# Patient Record
Sex: FEMALE | Race: WHITE | NOT HISPANIC OR LATINO | Employment: FULL TIME | URBAN - METROPOLITAN AREA
[De-identification: names, ages, dates, MRNs, and addresses within clinical notes are randomized per-mention and may not be internally consistent; named-entity substitution may affect disease eponyms.]

---

## 2017-07-26 DIAGNOSIS — Z12.31 VISIT FOR SCREENING MAMMOGRAM: Primary | ICD-10-CM

## 2017-08-15 ENCOUNTER — HOSPITAL ENCOUNTER (OUTPATIENT)
Dept: RADIOLOGY | Facility: HOSPITAL | Age: 57
Discharge: HOME | End: 2017-08-15
Attending: OBSTETRICS & GYNECOLOGY
Payer: COMMERCIAL

## 2017-08-15 VITALS — BODY MASS INDEX: 33.45 KG/M2 | WEIGHT: 220 LBS

## 2017-08-15 DIAGNOSIS — Z12.31 VISIT FOR SCREENING MAMMOGRAM: ICD-10-CM

## 2017-08-15 PROCEDURE — 77063 BREAST TOMOSYNTHESIS BI: CPT | Mod: 26,,, | Performed by: RADIOLOGY

## 2017-08-15 PROCEDURE — 77067 SCR MAMMO BI INCL CAD: CPT | Mod: TC

## 2017-08-15 PROCEDURE — 77067 SCR MAMMO BI INCL CAD: CPT | Mod: 26,,, | Performed by: RADIOLOGY

## 2017-11-29 ENCOUNTER — TELEPHONE (OUTPATIENT)
Dept: OPTOMETRY | Facility: CLINIC | Age: 57
End: 2017-11-29

## 2017-11-30 ENCOUNTER — OFFICE VISIT (OUTPATIENT)
Dept: OPTOMETRY | Facility: CLINIC | Age: 57
End: 2017-11-30
Payer: COMMERCIAL

## 2017-11-30 DIAGNOSIS — H04.123 BILATERAL DRY EYES: Primary | ICD-10-CM

## 2017-11-30 DIAGNOSIS — H52.4 BILATERAL PRESBYOPIA: ICD-10-CM

## 2017-11-30 PROCEDURE — 92014 COMPRE OPH EXAM EST PT 1/>: CPT | Mod: S$GLB,,, | Performed by: OPTOMETRIST

## 2017-11-30 PROCEDURE — 99999 PR PBB SHADOW E&M-EST. PATIENT-LVL III: CPT | Mod: PBBFAC,,, | Performed by: OPTOMETRIST

## 2017-11-30 RX ORDER — ESTRADIOL 0.04 MG/D
1 PATCH TRANSDERMAL
Refills: 2 | COMMUNITY
Start: 2017-11-07 | End: 2019-11-20 | Stop reason: SDUPTHER

## 2017-11-30 RX ORDER — CYCLOSPORINE 0.5 MG/ML
1 EMULSION OPHTHALMIC 2 TIMES DAILY
Qty: 60 VIAL | Refills: 12 | Status: SHIPPED | OUTPATIENT
Start: 2017-11-30 | End: 2018-12-10 | Stop reason: SDUPTHER

## 2017-12-04 NOTE — PROGRESS NOTES
GERONIMO GONZALEZ 11/2015  Using restasis and art tears with relief  Has some steroid drops if needed  No vision changes    Last edited by Eloy Mojica, OD on 12/4/2017  7:56 AM. (History)            Assessment /Plan     For exam results, see Encounter Report.    Bilateral dry eyes - Both Eyes  -     cycloSPORINE (RESTASIS) 0.05 % ophthalmic emulsion; Place 0.4 mLs (1 drop total) into both eyes 2 (two) times daily.  Dispense: 60 vial; Refill: 12    Bilateral presbyopia      1. Cont Restasis and art tears. Monitor condition. Patient to report any changes. RTC 1 year recheck.  2. Cont with OTC readers.

## 2018-02-06 DIAGNOSIS — Z12.11 ENCOUNTER FOR COLONOSCOPY DUE TO HISTORY OF COLONIC POLYP: Primary | ICD-10-CM

## 2018-02-06 DIAGNOSIS — Z86.010 ENCOUNTER FOR COLONOSCOPY DUE TO HISTORY OF COLONIC POLYP: Primary | ICD-10-CM

## 2018-02-06 RX ORDER — SODIUM, POTASSIUM,MAG SULFATES 17.5-3.13G
SOLUTION, RECONSTITUTED, ORAL ORAL
Qty: 1 BOTTLE | Refills: 0 | Status: ON HOLD | OUTPATIENT
Start: 2018-02-06 | End: 2018-03-05 | Stop reason: ALTCHOICE

## 2018-03-05 ENCOUNTER — HOSPITAL ENCOUNTER (OUTPATIENT)
Facility: HOSPITAL | Age: 58
Discharge: HOME | End: 2018-03-05
Attending: INTERNAL MEDICINE | Admitting: INTERNAL MEDICINE
Payer: COMMERCIAL

## 2018-03-05 ENCOUNTER — SURGERY (OUTPATIENT)
Age: 58
End: 2018-03-05

## 2018-03-05 ENCOUNTER — ANESTHESIA EVENT (OUTPATIENT)
Dept: ENDOSCOPY | Facility: HOSPITAL | Age: 58
End: 2018-03-05
Payer: COMMERCIAL

## 2018-03-05 ENCOUNTER — ANESTHESIA (OUTPATIENT)
Dept: ENDOSCOPY | Facility: HOSPITAL | Age: 58
End: 2018-03-05
Payer: COMMERCIAL

## 2018-03-05 VITALS
HEIGHT: 68 IN | SYSTOLIC BLOOD PRESSURE: 124 MMHG | RESPIRATION RATE: 16 BRPM | BODY MASS INDEX: 30.62 KG/M2 | TEMPERATURE: 98 F | WEIGHT: 202 LBS | DIASTOLIC BLOOD PRESSURE: 59 MMHG | HEART RATE: 64 BPM | OXYGEN SATURATION: 99 %

## 2018-03-05 DIAGNOSIS — Z86.010 HISTORY OF ADENOMATOUS POLYP OF COLON: ICD-10-CM

## 2018-03-05 PROBLEM — Z86.0101 HISTORY OF ADENOMATOUS POLYP OF COLON: Status: ACTIVE | Noted: 2018-03-05

## 2018-03-05 PROCEDURE — 25000003 PHARM REV CODE 250: Performed by: INTERNAL MEDICINE

## 2018-03-05 PROCEDURE — D9220A PRA ANESTHESIA: Mod: CRNA,,, | Performed by: NURSE ANESTHETIST, CERTIFIED REGISTERED

## 2018-03-05 PROCEDURE — G0105 COLORECTAL SCRN; HI RISK IND: HCPCS | Mod: ,,, | Performed by: INTERNAL MEDICINE

## 2018-03-05 PROCEDURE — 37000008 HC ANESTHESIA 1ST 15 MINUTES: Performed by: INTERNAL MEDICINE

## 2018-03-05 PROCEDURE — D9220A PRA ANESTHESIA: Mod: ANES,,, | Performed by: ANESTHESIOLOGY

## 2018-03-05 PROCEDURE — 37000009 HC ANESTHESIA EA ADD 15 MINS: Performed by: INTERNAL MEDICINE

## 2018-03-05 PROCEDURE — 63600175 PHARM REV CODE 636 W HCPCS: Performed by: NURSE ANESTHETIST, CERTIFIED REGISTERED

## 2018-03-05 PROCEDURE — G0105 COLORECTAL SCRN; HI RISK IND: HCPCS | Performed by: INTERNAL MEDICINE

## 2018-03-05 RX ORDER — PROPOFOL 10 MG/ML
VIAL (ML) INTRAVENOUS
Status: DISCONTINUED | OUTPATIENT
Start: 2018-03-05 | End: 2018-03-05

## 2018-03-05 RX ORDER — SODIUM CHLORIDE 9 MG/ML
INJECTION, SOLUTION INTRAVENOUS CONTINUOUS
Status: DISCONTINUED | OUTPATIENT
Start: 2018-03-05 | End: 2018-03-05 | Stop reason: HOSPADM

## 2018-03-05 RX ORDER — PROPOFOL 10 MG/ML
VIAL (ML) INTRAVENOUS CONTINUOUS PRN
Status: DISCONTINUED | OUTPATIENT
Start: 2018-03-05 | End: 2018-03-05

## 2018-03-05 RX ADMIN — PROPOFOL 125 MCG/KG/MIN: 10 INJECTION, EMULSION INTRAVENOUS at 01:03

## 2018-03-05 RX ADMIN — PROPOFOL 50 MG: 10 INJECTION, EMULSION INTRAVENOUS at 01:03

## 2018-03-05 RX ADMIN — SODIUM CHLORIDE: 9 INJECTION, SOLUTION INTRAVENOUS at 11:03

## 2018-03-05 NOTE — TRANSFER OF CARE
"Anesthesia Transfer of Care Note    Patient: Vanessa Beauchmapthieux    Procedure(s) Performed: Procedure(s) (LRB):  COLONOSCOPY (N/A)    Patient location: PACU    Anesthesia Type: general    Transport from OR: Transported from OR on room air with adequate spontaneous ventilation    Post pain: adequate analgesia    Post assessment: no apparent anesthetic complications and tolerated procedure well    Post vital signs: stable    Level of consciousness: alert and awake    Nausea/Vomiting: no nausea/vomiting    Complications: none    Transfer of care protocol was followed      Last vitals:   Visit Vitals  BP (!) 127/58 (BP Location: Left arm, Patient Position: Lying)   Pulse 78   Temp 36.6 °C (97.9 °F) (Temporal)   Resp 16   Ht 5' 8" (1.727 m)   Wt 91.6 kg (202 lb)   LMP 01/01/2013   SpO2 97%   Breastfeeding? No   BMI 30.71 kg/m²     "

## 2018-03-05 NOTE — ANESTHESIA POSTPROCEDURE EVALUATION
"Anesthesia Post Evaluation    Patient: Vanessa Olsen    Procedure(s) Performed: Procedure(s) (LRB):  COLONOSCOPY (N/A)    Final Anesthesia Type: general  Patient location during evaluation: GI PACU  Patient participation: Yes- Able to Participate  Level of consciousness: awake and alert and oriented  Post-procedure vital signs: reviewed and stable  Pain management: adequate  Airway patency: patent  PONV status at discharge: No PONV  Anesthetic complications: no      Cardiovascular status: stable  Respiratory status: unassisted, spontaneous ventilation and room air  Hydration status: euvolemic  Follow-up not needed.        Visit Vitals  /60 (BP Location: Left arm, Patient Position: Sitting)   Pulse 67   Temp 36.6 °C (97.9 °F) (Temporal)   Resp 16   Ht 5' 8" (1.727 m)   Wt 91.6 kg (202 lb)   LMP 01/01/2013   SpO2 98%   Breastfeeding? No   BMI 30.71 kg/m²       Pain/Joan Score: Pain Assessment Performed: Yes (3/5/2018  1:43 PM)  Presence of Pain: denies (3/5/2018  1:43 PM)  Joan Score: 10 (3/5/2018  1:43 PM)      "

## 2018-03-05 NOTE — ANESTHESIA PREPROCEDURE EVALUATION
03/05/2018  Vanessa Olsen is a 57 y.o., female with a pre-operative diagnosis of Encounter for colonoscopy due to history of colonic polyp [Z12.11, Z86.010] who is scheduled for Procedure(s) (LRB):  COLONOSCOPY (N/A).     Requested anesthesia type: General  Surgeon: Francisco Du MD  Allergies: Review of patient's allergies indicates:  No Known Allergies  Vital Sign Range:    Chronic Medications:   Prescriptions Prior to Admission   Medication Sig Dispense Refill Last Dose    cetirizine (ZYRTEC) 5 MG tablet Take 5 mg by mouth once daily.   Taking    cycloSPORINE (RESTASIS) 0.05 % ophthalmic emulsion Place 0.4 mLs (1 drop total) into both eyes 2 (two) times daily. 60 vial 12     estradiol (CLIMARA) 0.0375 mg/24 hr 1 patch every 7 days.  2 Taking    medroxyPROGESTERone (PROVERA) 5 MG tablet Take 5 mg by mouth once daily. Days 1 thru 15 every month   Taking    peg 3350-electrolytes-vit C (MOVIPREP) 100-7.5-2.691 gram PwPk As directed 1 each 0 Taking    peg 400-propylene glycol (SYSTANE ULTRA) 0.4-0.3 % Drop Apply 1 drop to eye 3 (three) times daily.   Taking    sodium,potassium,mag sulfates (SUPREP BOWEL PREP KIT) 17.5-3.13-1.6 gram SolR As directed, dispense 1 kit. 1 Bottle 0     VIVELLE-DOT 0.05 mg/24 hr    Taking     Current Medications:   No current facility-administered medications for this encounter.      Medical History:   Past Medical History:   Diagnosis Date    Fracture, ribs      .    Anesthesia Evaluation    I have reviewed the Patient Summary Reports.    I have reviewed the Nursing Notes.   I have reviewed the Medications.     Review of Systems  Anesthesia Hx:  No problems with previous Anesthesia  Denies Family Hx of Anesthesia complications.   Denies Personal Hx of Anesthesia complications.   Hematology/Oncology:  Hematology Normal   Oncology Normal     EENT/Dental:EENT/Dental  Normal   Cardiovascular:  Cardiovascular Normal     Pulmonary:  Pulmonary Normal    Renal/:  Renal/ Normal     Hepatic/GI:  Hepatic/GI Normal    Musculoskeletal:  Musculoskeletal Normal    Neurological:  Neurology Normal    Endocrine:  Endocrine Normal    Dermatological:  Skin Normal    Psych:  Psychiatric Normal           Physical Exam  General:  Well nourished, Obesity    Airway/Jaw/Neck:  Airway Findings: Mouth Opening: Normal Tongue: Normal  General Airway Assessment: Adult, Good  Mallampati: II  Improves to II with phonation.  TM Distance: Normal, at least 6 cm  Jaw/Neck Findings:     Neck ROM: Normal ROM      Dental:  Dental Findings: In tact   Chest/Lungs:  Chest/Lungs Findings: Clear to auscultation, Normal Respiratory Rate     Heart/Vascular:  Heart Findings: Rate: Normal  Rhythm: Regular Rhythm  Sounds: Normal     Abdomen:  Abdomen Findings:  Normal, Soft, Nontender     Musculoskeletal:  Musculoskeletal Findings: Normal   Skin:  Skin Findings: Normal    Mental Status:  Mental Status Findings:  Cooperative, Alert and Oriented         Anesthesia Plan  Type of Anesthesia, risks & benefits discussed:  Anesthesia Type:  general, MAC  Patient's Preference: as indicated  Intra-op Monitoring Plan: standard ASA monitors  Intra-op Monitoring Plan Comments:   Post Op Pain Control Plan:   Post Op Pain Control Plan Comments:   Induction:   IV  Beta Blocker:  Patient is not currently on a Beta-Blocker (No further documentation required).       Informed Consent: Patient understands risks and agrees with Anesthesia plan.  Questions answered. Anesthesia consent signed with patient.  ASA Score: 1     Day of Surgery Review of History & Physical:  There are no significant changes.  H&P update referred to the provider.     Anesthesia Plan Notes: Reassurance given.        Ready For Surgery From Anesthesia Perspective.

## 2018-03-05 NOTE — H&P
[facility]  History & Physical    Subjective:      Chief Complaint/Reason for Admission:     Colonoscopy    Vanessa Olsen is a 57 y.o. female.    Past Medical History:   Diagnosis Date    Fracture, ribs      Past Surgical History:   Procedure Laterality Date    BREAST BIOPSY Left     CHOLECYSTECTOMY      MOUTH SURGERY       Family History   Problem Relation Age of Onset    Cataracts Mother     Hypertension Mother     Hypertension Father     Colon polyps Father 82     large poly needing surgery    Glaucoma Brother     Hypertension Brother     Colon polyps Brother 58     several advanced colon adenomas    Amblyopia Neg Hx     Blindness Neg Hx     Cancer Neg Hx     Diabetes Neg Hx     Macular degeneration Neg Hx     Retinal detachment Neg Hx     Strabismus Neg Hx     Stroke Neg Hx     Thyroid disease Neg Hx     Celiac disease Neg Hx     Cirrhosis Neg Hx     Colon cancer Neg Hx     Esophageal cancer Neg Hx     Inflammatory bowel disease Neg Hx     Liver disease Neg Hx     Rectal cancer Neg Hx     Stomach cancer Neg Hx     Liver cancer Neg Hx     Ulcerative colitis Neg Hx      Social History   Substance Use Topics    Smoking status: Never Smoker    Smokeless tobacco: Never Used    Alcohol use Yes      Comment: socially       PTA Medications   Medication Sig    estradiol (CLIMARA) 0.0375 mg/24 hr 1 patch every 7 days.    medroxyPROGESTERone (PROVERA) 5 MG tablet Take 5 mg by mouth once daily. Days 1 thru 15 every month    peg 3350-electrolytes-vit C (MOVIPREP) 100-7.5-2.691 gram PwPk As directed    cetirizine (ZYRTEC) 5 MG tablet Take 5 mg by mouth once daily.    cycloSPORINE (RESTASIS) 0.05 % ophthalmic emulsion Place 0.4 mLs (1 drop total) into both eyes 2 (two) times daily.    peg 400-propylene glycol (SYSTANE ULTRA) 0.4-0.3 % Drop Apply 1 drop to eye 3 (three) times daily.    VIVELLE-DOT 0.05 mg/24 hr      Review of patient's allergies indicates:  No  Known Allergies     Review of Systems   Constitutional: Negative for chills, fever and weight loss.   Respiratory: Negative for shortness of breath and wheezing.    Cardiovascular: Negative for chest pain.   Gastrointestinal: Negative for abdominal pain, blood in stool, constipation and melena.       Objective:      Vital Signs (Most Recent)  Temp: 98.1 °F (36.7 °C) (03/05/18 1125)  Pulse: 66 (03/05/18 1125)  Resp: 16 (03/05/18 1125)  BP: 132/63 (03/05/18 1125)  SpO2: 98 % (03/05/18 1125)    Vital Signs Range (Last 24H):  Temp:  [98.1 °F (36.7 °C)]   Pulse:  [66]   Resp:  [16]   BP: (132)/(63)   SpO2:  [98 %]     Physical Exam   Constitutional: She appears well-developed and well-nourished.   Cardiovascular: Normal rate.    Pulmonary/Chest: Effort normal and breath sounds normal.   Abdominal: Soft. Bowel sounds are normal.   Psychiatric: She has a normal mood and affect. Her behavior is normal. Judgment and thought content normal.         Assessment:      Active Hospital Problems    Diagnosis  POA    History of adenomatous polyp of colon [Z86.010]  Not Applicable      Resolved Hospital Problems    Diagnosis Date Resolved POA   No resolved problems to display.       Plan:    Colonoscopy for history of colon adenoma and brother with advanced colon adenoma less than age 60.

## 2018-03-05 NOTE — PROVATION PATIENT INSTRUCTIONS
Discharge Summary/Instructions after an Endoscopic Procedure  Patient Name: Vanessa Olsen  Patient MRN: [id number]  Patient YOB: 1960 Monday, March 05, 2018  Francisco Du MD  RESTRICTIONS:  During your procedure today, you received medications for sedation.  These   medications may affect your judgment, balance and coordination.  Therefore,   for 24 hours, you have the following restrictions:   - DO NOT drive a car, operate machinery, make legal/financial decisions,   sign important papers or drink alcohol.    ACTIVITY:  The following day: return to full activity including work, except no heavy   lifting, straining or running for 3 days if polyps were removed.  DIET:  Eat and drink normally unless instructed otherwise.     TREATMENT FOR COMMON SIDE EFFECTS:  - Mild abdominal pain, nausea, belching, bloating or excessive gas:  rest,   eat lightly and use a heating pad.  - Sore Throat: treat with throat lozenges and/or gargle with warm salt   water.  - Because air was used during the procedure, expelling large amounts of air   from your rectum or belching is normal.  - If a bowel prep was taken, you may not have a bowel movement for 1-3 days.    This is normal.  SYMPTOMS TO WATCH FOR AND REPORT TO YOUR PHYSICIAN:  1. Abdominal pain or bloating, other than gas cramps.  2. Chest pain.  3. Back pain.  4. Signs of infection such as: chills or fever occurring within 24 hours   after the procedure.  5. Rectal bleeding, which would show as bright red, maroon, or black stools.   (A tablespoon of blood from the rectum is not serious, especially if   hemorrhoids are present.)  6. Vomiting.  7. Weakness or dizziness.  GO DIRECTLY TO THE NEAREST EMERGENCY ROOM IF YOU HAVE ANY OF THE FOLLOWING:      Difficulty breathing  Chills and/or fever over 101 F   Persistent vomiting and/or vomiting blood   Severe abdominal pain   Severe chest pain   Black, tarry stools   Bleeding- more than one tablespoon   Any other  symptom or condition that you feel may need urgent attention  Your doctor recommends these additional instructions:  If any biopsies were taken, your doctors clinic will contact you in 1 to 2   weeks with any results.  You are being discharged to home.   Your physician has recommended a repeat colonoscopy in five years for   surveillance.   The findings and recommendations have been discussed with you.   Return to your GI clinic.  For questions, problems or results please call your physician - Francisco Du MD at Work:  [phone].  [facility], EMERGENCY ROOM PHONE NUMBER: [phone]  IF A COMPLICATION OR EMERGENCY SITUATION ARISES AND YOU ARE UNABLE TO REACH   YOUR PHYSICIAN - GO DIRECTLY TO THE EMERGENCY ROOM.  Francisco Du MD  3/5/2018 1:26:47 PM  This report has been verified and signed electronically.

## 2018-03-05 NOTE — ANESTHESIA RELEASE NOTE
"Anesthesia Release from PACU Note    Patient: Vanessa Beauchampthieux    Procedure(s) Performed: Procedure(s) (LRB):  COLONOSCOPY (N/A)    Anesthesia type: GEN    Post pain: Adequate analgesia reported    Post assessment: no apparent anesthetic complications, tolerated procedure well and no evidence of recall    Post vital signs: /60 (BP Location: Left arm, Patient Position: Sitting)   Pulse 67   Temp 36.6 °C (97.9 °F) (Temporal)   Resp 16   Ht 5' 8" (1.727 m)   Wt 91.6 kg (202 lb)   LMP 01/01/2013   SpO2 98%   Breastfeeding? No   BMI 30.71 kg/m²     Level of consciousness: awake, alert and oriented    Nausea/Vomiting: no nausea/no vomiting    Complications: none    Airway Patency: patent    Respiratory: unassisted, spontaneous ventilation, room air    Cardiovascular: stable and blood pressure at baseline    Hydration: euvolemic  "

## 2018-03-12 ENCOUNTER — TELEPHONE (OUTPATIENT)
Dept: ENDOSCOPY | Facility: HOSPITAL | Age: 58
End: 2018-03-12

## 2018-04-30 ENCOUNTER — PATIENT MESSAGE (OUTPATIENT)
Dept: OPTOMETRY | Facility: CLINIC | Age: 58
End: 2018-04-30

## 2018-05-01 RX ORDER — FLUOROMETHOLONE 1 MG/ML
1 SUSPENSION/ DROPS OPHTHALMIC 3 TIMES DAILY
Qty: 5 ML | Refills: 0 | Status: SHIPPED | OUTPATIENT
Start: 2018-05-01 | End: 2018-05-11

## 2018-05-01 NOTE — PROGRESS NOTES
Assessment /Plan     For exam results, see Encounter Report.    There are no diagnoses linked to this encounter.  1. Pt requested refill on fml drops for dry eyes.

## 2018-07-31 ENCOUNTER — HOSPITAL ENCOUNTER (OUTPATIENT)
Dept: RADIOLOGY | Facility: HOSPITAL | Age: 58
Discharge: HOME | End: 2018-07-31
Attending: OBSTETRICS & GYNECOLOGY
Payer: COMMERCIAL

## 2018-07-31 DIAGNOSIS — Z12.31 SCREENING MAMMOGRAM, ENCOUNTER FOR: Primary | ICD-10-CM

## 2018-07-31 DIAGNOSIS — Z12.31 SCREENING MAMMOGRAM, ENCOUNTER FOR: ICD-10-CM

## 2018-07-31 PROCEDURE — 77067 SCR MAMMO BI INCL CAD: CPT | Mod: 26,,, | Performed by: RADIOLOGY

## 2018-07-31 PROCEDURE — 77063 BREAST TOMOSYNTHESIS BI: CPT | Mod: 26,,, | Performed by: RADIOLOGY

## 2018-07-31 PROCEDURE — 77067 SCR MAMMO BI INCL CAD: CPT | Mod: TC

## 2018-08-20 DIAGNOSIS — R10.2 PELVIC PAIN: Primary | ICD-10-CM

## 2018-08-22 ENCOUNTER — HOSPITAL ENCOUNTER (OUTPATIENT)
Dept: RADIOLOGY | Facility: HOSPITAL | Age: 58
Discharge: HOME | End: 2018-08-22
Attending: OBSTETRICS & GYNECOLOGY
Payer: COMMERCIAL

## 2018-08-22 DIAGNOSIS — R10.2 PELVIC PAIN: ICD-10-CM

## 2018-08-22 PROCEDURE — 76856 US EXAM PELVIC COMPLETE: CPT | Mod: TC

## 2018-08-22 PROCEDURE — 76830 TRANSVAGINAL US NON-OB: CPT | Mod: TC

## 2018-08-22 PROCEDURE — 76830 TRANSVAGINAL US NON-OB: CPT | Mod: 26,,, | Performed by: RADIOLOGY

## 2018-08-22 PROCEDURE — 76856 US EXAM PELVIC COMPLETE: CPT | Mod: 26,,, | Performed by: RADIOLOGY

## 2018-12-10 ENCOUNTER — OFFICE VISIT (OUTPATIENT)
Dept: OPTOMETRY | Facility: CLINIC | Age: 58
End: 2018-12-10
Payer: COMMERCIAL

## 2018-12-10 DIAGNOSIS — H52.4 BILATERAL PRESBYOPIA: ICD-10-CM

## 2018-12-10 DIAGNOSIS — H04.123 BILATERAL DRY EYES: Primary | ICD-10-CM

## 2018-12-10 PROCEDURE — 92015 DETERMINE REFRACTIVE STATE: CPT | Mod: S$GLB,,, | Performed by: OPTOMETRIST

## 2018-12-10 PROCEDURE — 92014 COMPRE OPH EXAM EST PT 1/>: CPT | Mod: S$GLB,,, | Performed by: OPTOMETRIST

## 2018-12-10 PROCEDURE — 99999 PR PBB SHADOW E&M-EST. PATIENT-LVL II: CPT | Mod: PBBFAC,,, | Performed by: OPTOMETRIST

## 2018-12-10 RX ORDER — CYCLOSPORINE 0.5 MG/ML
1 EMULSION OPHTHALMIC 2 TIMES DAILY
Qty: 60 VIAL | Refills: 12 | Status: SHIPPED | OUTPATIENT
Start: 2018-12-10 | End: 2019-01-17

## 2018-12-10 NOTE — MEDICAL/APP STUDENT
Pt reports blurry vision at distance and intermediate OU, OS>OD. Gradual onset 6 months ago. Uses Restasis OU bid and artificial tears OU qd. Also has steroid drops to use prn. Denies eye pain, irritation or redness.  LISA: 11/2017, currently using only OTC readers

## 2018-12-10 NOTE — PROGRESS NOTES
HPI     Blur ou at near x mos, no assoc pain or red, constant, no relief over   time.  Using restasis for relief with dry eys  Need refill on restasis    Last edited by Eloy Mojica, OD on 12/10/2018  3:58 PM. (History)              Assessment /Plan     For exam results, see Encounter Report.    Bilateral dry eyes - Both Eyes  -     cycloSPORINE (RESTASIS) 0.05 % ophthalmic emulsion; Place 0.4 mLs (1 drop total) into both eyes 2 (two) times daily.  Dispense: 60 vial; Refill: 12    Bilateral presbyopia      1. Cont Restasis bid OU. Gave refills.   2. Spec Rx given. Different lens options discussed with patient. RTC 1 year full exam.

## 2019-01-15 ENCOUNTER — PATIENT MESSAGE (OUTPATIENT)
Dept: OPTOMETRY | Facility: CLINIC | Age: 59
End: 2019-01-15

## 2019-05-17 ENCOUNTER — OFFICE VISIT (OUTPATIENT)
Dept: OTOLARYNGOLOGY | Facility: CLINIC | Age: 59
End: 2019-05-17
Payer: COMMERCIAL

## 2019-05-17 VITALS
WEIGHT: 212.75 LBS | HEART RATE: 79 BPM | TEMPERATURE: 99 F | DIASTOLIC BLOOD PRESSURE: 70 MMHG | BODY MASS INDEX: 32.24 KG/M2 | SYSTOLIC BLOOD PRESSURE: 118 MMHG | HEIGHT: 68 IN

## 2019-05-17 DIAGNOSIS — H69.93 ETD (EUSTACHIAN TUBE DYSFUNCTION), BILATERAL: ICD-10-CM

## 2019-05-17 DIAGNOSIS — J01.90 ACUTE BACTERIAL SINUSITIS: Primary | ICD-10-CM

## 2019-05-17 DIAGNOSIS — J30.89 NON-SEASONAL ALLERGIC RHINITIS, UNSPECIFIED TRIGGER: ICD-10-CM

## 2019-05-17 DIAGNOSIS — B96.89 ACUTE BACTERIAL SINUSITIS: Primary | ICD-10-CM

## 2019-05-17 PROCEDURE — 96372 PR INJECTION,THERAP/PROPH/DIAG2ST, IM OR SUBCUT: ICD-10-PCS | Mod: S$GLB,,, | Performed by: OTOLARYNGOLOGY

## 2019-05-17 PROCEDURE — 99203 PR OFFICE/OUTPT VISIT, NEW, LEVL III, 30-44 MIN: ICD-10-PCS | Mod: 25,S$GLB,, | Performed by: OTOLARYNGOLOGY

## 2019-05-17 PROCEDURE — 3008F BODY MASS INDEX DOCD: CPT | Mod: CPTII,S$GLB,, | Performed by: OTOLARYNGOLOGY

## 2019-05-17 PROCEDURE — 96372 THER/PROPH/DIAG INJ SC/IM: CPT | Mod: S$GLB,,, | Performed by: OTOLARYNGOLOGY

## 2019-05-17 PROCEDURE — 99999 PR PBB SHADOW E&M-EST. PATIENT-LVL III: ICD-10-PCS | Mod: PBBFAC,,, | Performed by: OTOLARYNGOLOGY

## 2019-05-17 PROCEDURE — 99999 PR PBB SHADOW E&M-EST. PATIENT-LVL III: CPT | Mod: PBBFAC,,, | Performed by: OTOLARYNGOLOGY

## 2019-05-17 PROCEDURE — 3008F PR BODY MASS INDEX (BMI) DOCUMENTED: ICD-10-PCS | Mod: CPTII,S$GLB,, | Performed by: OTOLARYNGOLOGY

## 2019-05-17 PROCEDURE — 99203 OFFICE O/P NEW LOW 30 MIN: CPT | Mod: 25,S$GLB,, | Performed by: OTOLARYNGOLOGY

## 2019-05-17 RX ORDER — CYCLOSPORINE 0.5 MG/ML
EMULSION OPHTHALMIC
Refills: 5 | COMMUNITY
Start: 2019-05-11 | End: 2020-05-11 | Stop reason: SDUPTHER

## 2019-05-17 RX ORDER — FLUTICASONE PROPIONATE 50 MCG
2 SPRAY, SUSPENSION (ML) NASAL DAILY
Qty: 16 G | Refills: 12 | Status: SHIPPED | OUTPATIENT
Start: 2019-05-17 | End: 2020-12-29

## 2019-05-17 RX ORDER — AMOXICILLIN AND CLAVULANATE POTASSIUM 875; 125 MG/1; MG/1
1 TABLET, FILM COATED ORAL 2 TIMES DAILY
Qty: 20 TABLET | Refills: 0 | Status: SHIPPED | OUTPATIENT
Start: 2019-05-17 | End: 2019-05-27

## 2019-05-17 RX ORDER — ESTRADIOL 0.04 MG/D
FILM, EXTENDED RELEASE TRANSDERMAL
Refills: 0 | COMMUNITY
Start: 2019-05-09 | End: 2019-05-17 | Stop reason: SDUPTHER

## 2019-05-17 RX ORDER — METHYLPREDNISOLONE ACETATE 40 MG/ML
40 INJECTION, SUSPENSION INTRA-ARTICULAR; INTRALESIONAL; INTRAMUSCULAR; SOFT TISSUE
Status: COMPLETED | OUTPATIENT
Start: 2019-05-17 | End: 2019-05-17

## 2019-05-17 RX ADMIN — METHYLPREDNISOLONE ACETATE 40 MG: 40 INJECTION, SUSPENSION INTRA-ARTICULAR; INTRALESIONAL; INTRAMUSCULAR; SOFT TISSUE at 03:05

## 2019-05-17 NOTE — PROGRESS NOTES
Chief Complaint   Patient presents with    Otitis Media     Patient states bilateral pain 4/10.    Sinusitis     Patient states post nasal drip,running nose with blood in it x 4 days.   .    HPI:     Vanessa Olsen is a 58 y.o. female who presents for evaluation of a left facial pressure and bilateral aural fullness.  She states that she has had a 2 week history of nasal congestion and postnasal drip.  She reports that she has been using Tylenol cold and sinus but did not obtain much relief from this.  She does have symptoms gradually worsened until last night she was experiencing severe left maxillary tooth  pain and pressure with maxillary tooth pain.  She describes the pain as a 4/10 to 6/10.   She has also had increased rhinorrhea and describes a thickened yellow discharge with blood-tinged appearance.  She  admits to headaches.  She has not had sinus or nasal surgery. There is no history of sinonasal trauma.          Past Medical History:   Diagnosis Date    Fracture, ribs      Social History     Socioeconomic History    Marital status: Single     Spouse name: Not on file    Number of children: Not on file    Years of education: Not on file    Highest education level: Not on file   Occupational History    Not on file   Social Needs    Financial resource strain: Not on file    Food insecurity:     Worry: Not on file     Inability: Not on file    Transportation needs:     Medical: Not on file     Non-medical: Not on file   Tobacco Use    Smoking status: Never Smoker    Smokeless tobacco: Never Used   Substance and Sexual Activity    Alcohol use: Yes     Comment: socially    Drug use: No    Sexual activity: Not on file   Lifestyle    Physical activity:     Days per week: Not on file     Minutes per session: Not on file    Stress: Not on file   Relationships    Social connections:     Talks on phone: Not on file     Gets together: Not on file     Attends Mosque service: Not on file      Active member of club or organization: Not on file     Attends meetings of clubs or organizations: Not on file     Relationship status: Not on file   Other Topics Concern    Not on file   Social History Narrative    Not on file     Past Surgical History:   Procedure Laterality Date    BREAST BIOPSY Left     CHOLECYSTECTOMY      COLONOSCOPY N/A 3/5/2018    [facility]formed [facility] ([facility] F[facility])    COLONOSCOPY N/A 11/3/2014    Performed by [facility] at [facility] END[facility] (4TH FLR)    MOUTH SURGERY       Family History   Problem Relation Age of Onset    Cataracts Mother     Hypertension Mother     Hypertension Father     Colon polyps Father 82        large poly needing surgery    Glaucoma Brother     Hypertension Brother     Colon polyps Brother 58        several advanced colon adenomas    Amblyopia Neg Hx     Blindness Neg Hx     Cancer Neg Hx     Diabetes Neg Hx     Macular degeneration Neg Hx     Retinal detachment Neg Hx     Strabismus Neg Hx     Stroke Neg Hx     Thyroid disease Neg Hx     Celiac disease Neg Hx     Cirrhosis Neg Hx     Colon cancer Neg Hx     Esophageal cancer Neg Hx     Inflammatory bowel disease Neg Hx     Liver disease Neg Hx     Rectal cancer Neg Hx     Stomach cancer Neg Hx     Liver cancer Neg Hx     Ulcerative colitis Neg Hx            Review of Systems  General: negative for chills, fever or weight loss  Psychological: negative for mood changes or depression  Ophthalmic: negative for blurry vision, photophobia or eye pain  ENT: see HPI  Respiratory: no cough, shortness of breath, or wheezing  Cardiovascular: no chest pain or dyspnea on exertion  Gastrointestinal: no abdominal pain, change in bowel habits, or black/ bloody stools  Musculoskeletal: negative for gait disturbance or muscular weakness  Neurological: no syncope or seizures; no ataxia  Dermatological: negative for puritis,  rash and jaundice  Hematologic/lymphatic: no easy  bruising, no new lumps or bumps      Physical Exam:    Vitals:    05/17/19 1437   BP: 118/70   Pulse: 79   Temp: 98.5 °F (36.9 °C)       Constitutional: Well appearing / communicating without difficutly.  NAD.  Eyes: EOM I Bilaterally  Head/Face: Normocephalic.  Negative paranasal sinus pressure/tenderness.  Salivary glands WNL.  House Brackmann I Bilaterally.    Right Ear: Auricle normal appearance. External Auditory Canal within normal limits,TM w/o masses/lesions/perforations. TM mobility noted.   Left Ear: Auricle normal appearance. External Auditory Canal WNL,TM w/o masses/lesions/perforations. TM mobility noted.  Nose: No gross nasal septal deviation. Inferior Turbinates 3+ bilaterally. No septal perforation. No masses/lesions. External nasal skin appears normal without masses/lesions.  Purulent rhinorrhea in the left inferior meatus.  Oral Cavity: Gingiva/lips within normal limits.  Dentition/gingiva healthy appearing. Mucus membranes moist. Floor of mouth soft, no masses palpated. Oral Tongue mobile. Hard Palate appears normal.    Oropharynx: Base of tongue appears normal. No masses/lesions noted. Tonsillar fossa/pharyngeal wall without lesions. Posterior oropharynx WNL.  Soft palate without masses. Midline uvula.   Neck/Lymphatic: No LAD I-VI bilaterally.  No thyromegaly.  No masses noted on exam.    Mirror laryngoscopy/nasopharyngoscopy: Active gag reflex.  Unable to perform.    Neuro/Psychiatric: AOx3.  Normal mood and affect.   Cardiovascular: Normal carotid pulses bilaterally, no increasing jugular venous distention noted at cervical region bilaterally.    Respiratory: Normal respiratory effort, no stridor, no retractions noted.        Assessment:    ICD-10-CM ICD-9-CM    1. Acute bacterial sinusitis J01.90 461.9     B96.89     2. Non-seasonal allergic rhinitis, unspecified trigger J30.89 477.8    3. ETD (Eustachian tube dysfunction), bilateral H69.83 381.81      The primary encounter diagnosis was  Acute bacterial sinusitis. Diagnoses of Non-seasonal allergic rhinitis, unspecified trigger and ETD (Eustachian tube dysfunction), bilateral were also pertinent to this visit.      Plan:  No orders of the defined types were placed in this encounter.     Augmentin 875 mg p.o. b.i.d. for 10 days.  Depo-Medrol IM injection given in clinic today.  Flonase 2 sprays per nostril daily  Nasal saline rinses b.i.d.  Follow-up if symptoms worsen or fail to improve.    Salma Vu MD

## 2019-07-05 DIAGNOSIS — Z12.39 BREAST CANCER SCREENING: Primary | ICD-10-CM

## 2019-08-01 ENCOUNTER — HOSPITAL ENCOUNTER (OUTPATIENT)
Dept: RADIOLOGY | Facility: HOSPITAL | Age: 59
Discharge: HOME | End: 2019-08-01
Attending: INTERNAL MEDICINE
Payer: COMMERCIAL

## 2019-08-01 DIAGNOSIS — Z12.39 BREAST CANCER SCREENING: ICD-10-CM

## 2019-08-01 PROCEDURE — 77067 SCR MAMMO BI INCL CAD: CPT | Mod: 26,,, | Performed by: RADIOLOGY

## 2019-08-01 PROCEDURE — 77063 MAMMO DIGITAL SCREENING BILAT WITH TOMOSYNTHESIS_CAD: ICD-10-PCS | Mod: 26,,, | Performed by: RADIOLOGY

## 2019-08-01 PROCEDURE — 77067 SCR MAMMO BI INCL CAD: CPT | Mod: TC

## 2019-08-01 PROCEDURE — 77063 BREAST TOMOSYNTHESIS BI: CPT | Mod: 26,,, | Performed by: RADIOLOGY

## 2019-08-01 PROCEDURE — 77067 MAMMO DIGITAL SCREENING BILAT WITH TOMOSYNTHESIS_CAD: ICD-10-PCS | Mod: 26,,, | Performed by: RADIOLOGY

## 2019-09-04 ENCOUNTER — OFFICE VISIT (OUTPATIENT)
Dept: INTERNAL MEDICINE | Facility: CLINIC | Age: 59
End: 2019-09-04
Payer: COMMERCIAL

## 2019-09-04 VITALS
DIASTOLIC BLOOD PRESSURE: 68 MMHG | HEART RATE: 70 BPM | OXYGEN SATURATION: 97 % | SYSTOLIC BLOOD PRESSURE: 118 MMHG | BODY MASS INDEX: 31.81 KG/M2 | WEIGHT: 209.88 LBS | HEIGHT: 68 IN

## 2019-09-04 DIAGNOSIS — J30.2 SEASONAL ALLERGIES: ICD-10-CM

## 2019-09-04 DIAGNOSIS — Z86.010 HISTORY OF ADENOMATOUS POLYP OF COLON: ICD-10-CM

## 2019-09-04 DIAGNOSIS — Z11.59 NEED FOR HEPATITIS C SCREENING TEST: ICD-10-CM

## 2019-09-04 DIAGNOSIS — Z00.00 PREVENTATIVE HEALTH CARE: Primary | ICD-10-CM

## 2019-09-04 PROCEDURE — 99386 PREV VISIT NEW AGE 40-64: CPT | Mod: S$GLB,,, | Performed by: INTERNAL MEDICINE

## 2019-09-04 PROCEDURE — 99386 PR PREVENTIVE VISIT,NEW,40-64: ICD-10-PCS | Mod: S$GLB,,, | Performed by: INTERNAL MEDICINE

## 2019-09-04 PROCEDURE — 99999 PR PBB SHADOW E&M-EST. PATIENT-LVL III: CPT | Mod: PBBFAC,,, | Performed by: INTERNAL MEDICINE

## 2019-09-04 PROCEDURE — 99999 PR PBB SHADOW E&M-EST. PATIENT-LVL III: ICD-10-PCS | Mod: PBBFAC,,, | Performed by: INTERNAL MEDICINE

## 2019-09-04 NOTE — PROGRESS NOTES
Subjective:       Patient ID: Vanessa Olsen is a 59 y.o. female.    Chief Complaint: Annual Exam (NP)    HPI 59-year-old female presents to clinic today as a new patient annual physical.  Patient is without any new complaints today.  She is working with weight loss through weight watchers and does dancing exercises class 2 times per week for the last 3 years.  Health care maintenance reviewed with patient.  She will get her flu vaccine tomorrow.  Women's health per her gynecologist  Review of Systems  otherwise negative  Objective:      Physical Exam  General: Well-appearing, well-nourished.  No distress  HEENT: conjunctivae are normal.  Pupils are equal and reative to light.  TM's are clear and intact bilaterally.  Hearing is grossly normal.  Nasopharynx is clear.  Oropharynx is clear.  Neck: Supple.  No thyroid megaly.  No bruits.  Lymph: No cervical or supraclavicular adenopathy.  Heart: Regular rate and rhythm, without murmur, rub or gallop.  Lungs: Clear to auscultation; respiratory effort normal.  Abdomen: Soft, nontender, nondistended.  Normoactive bowel sounds.  No hepatomegaly.  No masses.  Extremities: Good distal pulses.  No edema.  Psych: Oriented to time person place.  Judgment and insight seem unimpaired.  Mood and affect are appropriate.  Assessment:       1. Preventative health care    2. Seasonal allergies    3. Need for hepatitis C screening test    4. History of adenomatous polyp of colon      five.  Obesity BMI thirty-one patient is currently working on healthy lifestyle behavior modifications through healthy eating and exercise.  She is following weight watchers.  Plan:       .  Vanessa was seen today for annual exam.    Diagnoses and all orders for this visit:    Preventative health care  -     Comprehensive metabolic panel; Future  -     CBC auto differential; Future  -     TSH; Future  -     Lipid panel; Future  Performed reviewed and updated today anticipatory guidelines  reviewed  Seasonal allergies  .  Maintenance with Zyrtec and Flonase as needed.  Need for hepatitis C screening test  -     Hepatitis C antibody; Future    History of adenomatous polyp of colon  Colonoscopy surveillance reviewed with patient.

## 2019-09-05 ENCOUNTER — LAB VISIT (OUTPATIENT)
Dept: LAB | Facility: HOSPITAL | Age: 59
End: 2019-09-05
Attending: INTERNAL MEDICINE
Payer: COMMERCIAL

## 2019-09-05 DIAGNOSIS — Z11.59 NEED FOR HEPATITIS C SCREENING TEST: ICD-10-CM

## 2019-09-05 DIAGNOSIS — Z00.00 PREVENTATIVE HEALTH CARE: ICD-10-CM

## 2019-09-05 LAB
ALBUMIN SERPL BCP-MCNC: 3.9 G/DL (ref 3.5–5.2)
ALP SERPL-CCNC: 70 U/L (ref 55–135)
ALT SERPL W/O P-5'-P-CCNC: 13 U/L (ref 10–44)
ANION GAP SERPL CALC-SCNC: 8 MMOL/L (ref 8–16)
AST SERPL-CCNC: 18 U/L (ref 10–40)
BASOPHILS # BLD AUTO: 0.01 K/UL (ref 0–0.2)
BASOPHILS NFR BLD: 0.2 % (ref 0–1.9)
BILIRUB SERPL-MCNC: 0.7 MG/DL (ref 0.1–1)
BUN SERPL-MCNC: 18 MG/DL (ref 6–20)
CALCIUM SERPL-MCNC: 9.7 MG/DL (ref 8.7–10.5)
CHLORIDE SERPL-SCNC: 108 MMOL/L (ref 95–110)
CHOLEST SERPL-MCNC: 164 MG/DL (ref 120–199)
CHOLEST/HDLC SERPL: 3.1 {RATIO} (ref 2–5)
CO2 SERPL-SCNC: 25 MMOL/L (ref 23–29)
CREAT SERPL-MCNC: 0.9 MG/DL (ref 0.5–1.4)
DIFFERENTIAL METHOD: ABNORMAL
EOSINOPHIL # BLD AUTO: 0.3 K/UL (ref 0–0.5)
EOSINOPHIL NFR BLD: 4 % (ref 0–8)
ERYTHROCYTE [DISTWIDTH] IN BLOOD BY AUTOMATED COUNT: 12.3 % (ref 11.5–14.5)
EST. GFR  (AFRICAN AMERICAN): >60 ML/MIN/1.73 M^2
EST. GFR  (NON AFRICAN AMERICAN): >60 ML/MIN/1.73 M^2
GLUCOSE SERPL-MCNC: 96 MG/DL (ref 70–110)
HCT VFR BLD AUTO: 42.9 % (ref 37–48.5)
HDLC SERPL-MCNC: 53 MG/DL (ref 40–75)
HDLC SERPL: 32.3 % (ref 20–50)
HGB BLD-MCNC: 13.8 G/DL (ref 12–16)
LDLC SERPL CALC-MCNC: 97 MG/DL (ref 63–159)
LYMPHOCYTES # BLD AUTO: 1.1 K/UL (ref 1–4.8)
LYMPHOCYTES NFR BLD: 16.7 % (ref 18–48)
MCH RBC QN AUTO: 30.5 PG (ref 27–31)
MCHC RBC AUTO-ENTMCNC: 32.2 G/DL (ref 32–36)
MCV RBC AUTO: 95 FL (ref 82–98)
MONOCYTES # BLD AUTO: 0.4 K/UL (ref 0.3–1)
MONOCYTES NFR BLD: 6 % (ref 4–15)
NEUTROPHILS # BLD AUTO: 4.7 K/UL (ref 1.8–7.7)
NEUTROPHILS NFR BLD: 73.1 % (ref 38–73)
NONHDLC SERPL-MCNC: 111 MG/DL
PLATELET # BLD AUTO: 268 K/UL (ref 150–350)
PMV BLD AUTO: 10.4 FL (ref 9.2–12.9)
POTASSIUM SERPL-SCNC: 4.3 MMOL/L (ref 3.5–5.1)
PROT SERPL-MCNC: 7.2 G/DL (ref 6–8.4)
RBC # BLD AUTO: 4.52 M/UL (ref 4–5.4)
SODIUM SERPL-SCNC: 141 MMOL/L (ref 136–145)
TRIGL SERPL-MCNC: 70 MG/DL (ref 30–150)
TSH SERPL DL<=0.005 MIU/L-ACNC: 0.77 UIU/ML (ref 0.4–4)
WBC # BLD AUTO: 6.45 K/UL (ref 3.9–12.7)

## 2019-09-05 PROCEDURE — 80053 COMPREHEN METABOLIC PANEL: CPT

## 2019-09-05 PROCEDURE — 86803 HEPATITIS C AB TEST: CPT

## 2019-09-05 PROCEDURE — 36415 COLL VENOUS BLD VENIPUNCTURE: CPT

## 2019-09-05 PROCEDURE — 80061 LIPID PANEL: CPT

## 2019-09-05 PROCEDURE — 84443 ASSAY THYROID STIM HORMONE: CPT

## 2019-09-05 PROCEDURE — 85025 COMPLETE CBC W/AUTO DIFF WBC: CPT

## 2019-09-06 LAB — HCV AB SERPL QL IA: NEGATIVE

## 2019-09-30 ENCOUNTER — PATIENT MESSAGE (OUTPATIENT)
Dept: OBSTETRICS AND GYNECOLOGY | Facility: CLINIC | Age: 59
End: 2019-09-30

## 2019-10-09 ENCOUNTER — PATIENT MESSAGE (OUTPATIENT)
Dept: INTERNAL MEDICINE | Facility: CLINIC | Age: 59
End: 2019-10-09

## 2019-10-09 RX ORDER — MEDROXYPROGESTERONE ACETATE 5 MG/1
5 TABLET ORAL DAILY
Qty: 45 TABLET | Refills: 2 | Status: SHIPPED | OUTPATIENT
Start: 2019-10-09 | End: 2020-04-27

## 2019-10-11 ENCOUNTER — PATIENT MESSAGE (OUTPATIENT)
Dept: INTERNAL MEDICINE | Facility: CLINIC | Age: 59
End: 2019-10-11

## 2019-10-14 RX ORDER — SCOLOPAMINE TRANSDERMAL SYSTEM 1 MG/1
1 PATCH, EXTENDED RELEASE TRANSDERMAL
Qty: 5 PATCH | Refills: 0 | Status: SHIPPED | OUTPATIENT
Start: 2019-10-14 | End: 2020-12-29

## 2019-10-16 ENCOUNTER — PATIENT MESSAGE (OUTPATIENT)
Dept: INTERNAL MEDICINE | Facility: CLINIC | Age: 59
End: 2019-10-16

## 2019-11-20 ENCOUNTER — LAB VISIT (OUTPATIENT)
Dept: LAB | Facility: HOSPITAL | Age: 59
End: 2019-11-20
Attending: OBSTETRICS & GYNECOLOGY
Payer: COMMERCIAL

## 2019-11-20 ENCOUNTER — OFFICE VISIT (OUTPATIENT)
Dept: OBSTETRICS AND GYNECOLOGY | Facility: CLINIC | Age: 59
End: 2019-11-20
Payer: COMMERCIAL

## 2019-11-20 VITALS — BODY MASS INDEX: 31.07 KG/M2 | WEIGHT: 205 LBS | HEIGHT: 68 IN

## 2019-11-20 DIAGNOSIS — Z01.419 ENCOUNTER FOR GYNECOLOGICAL EXAMINATION: ICD-10-CM

## 2019-11-20 DIAGNOSIS — Z78.0 MENOPAUSE: ICD-10-CM

## 2019-11-20 DIAGNOSIS — Z12.4 SCREENING FOR MALIGNANT NEOPLASM OF CERVIX: Primary | ICD-10-CM

## 2019-11-20 DIAGNOSIS — Z13.21 ENCOUNTER FOR VITAMIN DEFICIENCY SCREENING: ICD-10-CM

## 2019-11-20 DIAGNOSIS — Z11.51 SCREENING FOR HUMAN PAPILLOMAVIRUS: ICD-10-CM

## 2019-11-20 LAB
25(OH)D3+25(OH)D2 SERPL-MCNC: 17 NG/ML (ref 30–96)
ESTRADIOL SERPL-MCNC: 15 PG/ML
PROGEST SERPL-MCNC: <0.1 NG/ML

## 2019-11-20 PROCEDURE — 99386 PREV VISIT NEW AGE 40-64: CPT | Mod: S$GLB,,, | Performed by: OBSTETRICS & GYNECOLOGY

## 2019-11-20 PROCEDURE — 99386 PR PREVENTIVE VISIT,NEW,40-64: ICD-10-PCS | Mod: S$GLB,,, | Performed by: OBSTETRICS & GYNECOLOGY

## 2019-11-20 PROCEDURE — 87624 HPV HI-RISK TYP POOLED RSLT: CPT

## 2019-11-20 PROCEDURE — 99999 PR PBB SHADOW E&M-EST. PATIENT-LVL III: CPT | Mod: PBBFAC,,, | Performed by: OBSTETRICS & GYNECOLOGY

## 2019-11-20 PROCEDURE — 84144 ASSAY OF PROGESTERONE: CPT

## 2019-11-20 PROCEDURE — 82670 ASSAY OF TOTAL ESTRADIOL: CPT

## 2019-11-20 PROCEDURE — 82306 VITAMIN D 25 HYDROXY: CPT

## 2019-11-20 PROCEDURE — 88175 CYTOPATH C/V AUTO FLUID REDO: CPT

## 2019-11-20 PROCEDURE — 99999 PR PBB SHADOW E&M-EST. PATIENT-LVL III: ICD-10-PCS | Mod: PBBFAC,,, | Performed by: OBSTETRICS & GYNECOLOGY

## 2019-11-20 RX ORDER — MEDROXYPROGESTERONE ACETATE 5 MG/1
5 TABLET ORAL DAILY
Qty: 90 TABLET | Refills: 3 | Status: CANCELLED | OUTPATIENT
Start: 2019-11-20

## 2019-11-20 RX ORDER — ESTRADIOL 0.04 MG/D
1 PATCH TRANSDERMAL WEEKLY
Qty: 12 PATCH | Refills: 3 | Status: SHIPPED | OUTPATIENT
Start: 2019-11-20 | End: 2019-11-20

## 2019-11-20 RX ORDER — ESTRADIOL 1 MG/1
1 TABLET ORAL DAILY
Qty: 30 TABLET | Refills: 11 | Status: SHIPPED | OUTPATIENT
Start: 2019-11-20 | End: 2020-04-27

## 2019-11-20 RX ORDER — PROGESTERONE 100 MG/1
100 CAPSULE ORAL DAILY
Qty: 30 CAPSULE | Refills: 11 | Status: SHIPPED | OUTPATIENT
Start: 2019-11-20 | End: 2020-04-27

## 2019-11-20 NOTE — PROGRESS NOTES
Subjective:       Patient ID: Vanessa Olsen is a 59 y.o. female.    Chief Complaint:  Well Woman (NP --  last pap , Negative  --  last 19, birads 1  --  colonoscopy 3-5-18, Normal   --  dexa, Never  )      History of Present Illness.  Vanessa Olsen is a 59 y.o. female.  She has no breast or urinary symptoms.  She has no pelvic pain or vaginal discharge.  Not sexually active.    Current HRT Regimen:  Estradiol patch .0375 mg weekly  Provera 5 mg QD    GYN & OB History  Patient's last menstrual period was 2012 (approximate).   Pap: 2018 Normal  Mammogram: 19 Birads  Colonoscopy: 3/5/18 Normal  DEXA: No  PCP:  Dr. Samantha Odom  Routine Labs:   2019    OB History    Para Term  AB Living   0 0 0 0 0 0   SAB TAB Ectopic Multiple Live Births   0 0 0 0 0   Obstetric Comments   Menarche ~ 10       Past Medical History:   Diagnosis Date    Allergy     Breast disorder     Left Breast Bx - Benign     Fracture, ribs     H/O cold sores     Hormone replacement therapy (HRT)     Post-menopause      Past Surgical History:   Procedure Laterality Date    BREAST BIOPSY Left     benign  (11:00  o'clock ?? )     CHOLECYSTECTOMY      COLONOSCOPY N/A 3/5/2018    Procedure: COLONOSCOPY;  Surgeon: Francisco Du MD;  Location: [address]);  Service: Endoscopy;  Laterality: N/A;  3 year f/u - history of colon polyps - ER    WISDOM TOOTH EXTRACTION       Family History   Problem Relation Age of Onset    Cataracts Mother     Hypertension Mother     Heart disease Mother         at fib and pacemaker    Hypertension Father     Colon polyps Father 82        large poly needing surgery    Pulmonary embolism Father     Glaucoma Brother     Hypertension Brother     Colon polyps Brother 58        several advanced colon adenomas    Breast cancer Paternal Aunt     Breast cancer Paternal Cousin     Lung cancer Brother     Prostate cancer Paternal Uncle      Amblyopia Neg Hx     Blindness Neg Hx     Diabetes Neg Hx     Macular degeneration Neg Hx     Retinal detachment Neg Hx     Strabismus Neg Hx     Stroke Neg Hx     Thyroid disease Neg Hx     Celiac disease Neg Hx     Cirrhosis Neg Hx     Colon cancer Neg Hx     Esophageal cancer Neg Hx     Inflammatory bowel disease Neg Hx     Liver disease Neg Hx     Rectal cancer Neg Hx     Stomach cancer Neg Hx     Liver cancer Neg Hx     Ulcerative colitis Neg Hx     Ovarian cancer Neg Hx      Social History     Tobacco Use    Smoking status: Never Smoker    Smokeless tobacco: Never Used   Substance Use Topics    Alcohol use: Yes     Frequency: 2-4 times a month     Drinks per session: 1 or 2     Binge frequency: Never     Comment: socially    Drug use: No       Current Outpatient Medications:     cetirizine (ZYRTEC) 5 MG tablet, Take 5 mg by mouth once daily., Disp: , Rfl:     fluticasone propionate (FLONASE) 50 mcg/actuation nasal spray, Use 2 sprays (100 mcg total) by Each Nare route once daily., Disp: 16 g, Rfl: 12    medroxyPROGESTERone (PROVERA) 5 MG tablet, Take 1 tablet (5 mg total) by mouth once daily. Days 1 thru 15 every month, Disp: 45 tablet, Rfl: 2    peg 400-propylene glycol (SYSTANE ULTRA) 0.4-0.3 % Drop, Apply 1 drop to eye 3 (three) times daily., Disp: , Rfl:     PENCICLOVIR TOP, 1 % by Other route., Disp: , Rfl:     RESTASIS 0.05 % ophthalmic emulsion, PLACE 1 DROP (0.4MLS) INTO BOTH EYES TWO TIMES A DAY, Disp: , Rfl: 5    estradiol (ESTRACE) 1 MG tablet, Take 1 tablet (1 mg total) by mouth once daily. Every morning, Disp: 30 tablet, Rfl: 11    progesterone (PROMETRIUM) 100 MG capsule, Take 1 capsule (100 mg total) by mouth once daily. 30-60 minutes before bed, Disp: 30 capsule, Rfl: 11    scopolamine (TRANSDERM-SCOP) 1.3-1.5 mg (1 mg over 3 days), Place 1 patch onto the skin every 72 hours., Disp: 5 patch, Rfl: 0    Review of patient's allergies indicates:  No Known  "Allergies    Review of Systems  Review of Systems   Constitutional: Negative for fatigue.   HENT: Negative for trouble swallowing.    Eyes: Negative for visual disturbance.   Respiratory: Negative for cough and shortness of breath.    Cardiovascular: Negative for chest pain.   Gastrointestinal: Negative for abdominal distention, abdominal pain, blood in stool, nausea and vomiting.   Genitourinary: Negative for difficulty urinating, dyspareunia, dysuria, flank pain, frequency, hematuria, pelvic pain, urgency, vaginal bleeding, vaginal discharge and vaginal pain.   Musculoskeletal: Negative for arthralgias.   Skin: Negative for rash.   Neurological: Negative for dizziness and headaches.   Psychiatric/Behavioral: Negative for sleep disturbance. The patient is not nervous/anxious.         Objective:     Vitals:    11/20/19 1316   Weight: 93 kg (205 lb 0.4 oz)   Height: 5' 8" (1.727 m)   PainSc: 0-No pain     Body mass index is 31.17 kg/m².    Physical Exam:   Constitutional: She is oriented to person, place, and time. Vital signs are normal. She appears well-developed and well-nourished.    HENT:   Head: Normocephalic.     Neck: Normal range of motion. No thyromegaly present.     Pulmonary/Chest: Right breast exhibits no mass, no nipple discharge, no skin change, no tenderness and no swelling. Left breast exhibits no mass, no nipple discharge, no skin change, no tenderness and no swelling. Breasts are symmetrical.        Abdominal: Soft. Normal appearance and bowel sounds are normal. She exhibits no distension. There is no tenderness.     Genitourinary: Vagina normal and uterus normal. Pelvic exam was performed with patient supine. There is no rash, tenderness, lesion or injury on the right labia. There is no rash, tenderness, lesion or injury on the left labia. Cervix is normal. Right adnexum displays no mass, no tenderness and no fullness. Left adnexum displays no mass, no tenderness and no fullness. No erythema in " the vagina. No vaginal discharge found. Cervix exhibits no motion tenderness and no discharge.           Musculoskeletal: Normal range of motion.      Lymphadenopathy:        Right: No inguinal and no supraclavicular adenopathy present.        Left: No inguinal and no supraclavicular adenopathy present.    Neurological: She is alert and oriented to person, place, and time.    Skin: Skin is warm and dry.    Psychiatric: She has a normal mood and affect.        Assessment/ Plan:     Screening for malignant neoplasm of cervix  -     Liquid-Based Pap Smear, Screening    Screening for human papillomavirus  -     HPV High Risk Genotypes, PCR    Menopause  -     Discontinue: estradiol (CLIMARA) 0.0375 mg/24 hr; Place 1 patch onto the skin once a week.  Dispense: 12 patch; Refill: 3  -     DXA Bone Density Spine And Hip; Future; Expected date: 11/20/2019  -     estradiol (ESTRACE) 1 MG tablet; Take 1 tablet (1 mg total) by mouth once daily. Every morning  Dispense: 30 tablet; Refill: 11  -     progesterone (PROMETRIUM) 100 MG capsule; Take 1 capsule (100 mg total) by mouth once daily. 30-60 minutes before bed  Dispense: 30 capsule; Refill: 11  -     Estradiol; Future; Expected date: 11/20/2019  -     Progesterone; Future; Expected date: 11/20/2019    Encounter for vitamin deficiency screening  -     Vitamin D; Future; Expected date: 11/20/2019      D/C provera and estradiol patch.  Routine pap smears.  Self breast exam and mammography discussed  Routine colonoscopy discussed.  Diet and exercise discussed.  Recommend calcium 1200 mg and vitamin D 1000 units daily and routine bone mineral density testing.  Yearly influenza vaccination discussed.  Follow-up with me in 3 months.

## 2019-11-21 ENCOUNTER — PATIENT MESSAGE (OUTPATIENT)
Dept: OTOLARYNGOLOGY | Facility: CLINIC | Age: 59
End: 2019-11-21

## 2019-11-22 ENCOUNTER — OFFICE VISIT (OUTPATIENT)
Dept: OTOLARYNGOLOGY | Facility: CLINIC | Age: 59
End: 2019-11-22
Payer: COMMERCIAL

## 2019-11-22 VITALS
HEART RATE: 67 BPM | TEMPERATURE: 98 F | HEIGHT: 68 IN | SYSTOLIC BLOOD PRESSURE: 124 MMHG | WEIGHT: 207.13 LBS | DIASTOLIC BLOOD PRESSURE: 58 MMHG | BODY MASS INDEX: 31.39 KG/M2

## 2019-11-22 DIAGNOSIS — J01.90 ACUTE BACTERIAL SINUSITIS: Primary | ICD-10-CM

## 2019-11-22 DIAGNOSIS — B96.89 ACUTE BACTERIAL SINUSITIS: Primary | ICD-10-CM

## 2019-11-22 DIAGNOSIS — R05.9 COUGH: ICD-10-CM

## 2019-11-22 DIAGNOSIS — J30.89 NON-SEASONAL ALLERGIC RHINITIS, UNSPECIFIED TRIGGER: ICD-10-CM

## 2019-11-22 PROCEDURE — 96372 THER/PROPH/DIAG INJ SC/IM: CPT | Mod: S$GLB,,, | Performed by: OTOLARYNGOLOGY

## 2019-11-22 PROCEDURE — 3008F PR BODY MASS INDEX (BMI) DOCUMENTED: ICD-10-PCS | Mod: CPTII,S$GLB,, | Performed by: OTOLARYNGOLOGY

## 2019-11-22 PROCEDURE — 96372 PR INJECTION,THERAP/PROPH/DIAG2ST, IM OR SUBCUT: ICD-10-PCS | Mod: S$GLB,,, | Performed by: OTOLARYNGOLOGY

## 2019-11-22 PROCEDURE — 3008F BODY MASS INDEX DOCD: CPT | Mod: CPTII,S$GLB,, | Performed by: OTOLARYNGOLOGY

## 2019-11-22 PROCEDURE — 99214 OFFICE O/P EST MOD 30 MIN: CPT | Mod: 25,S$GLB,, | Performed by: OTOLARYNGOLOGY

## 2019-11-22 PROCEDURE — 99999 PR PBB SHADOW E&M-EST. PATIENT-LVL III: ICD-10-PCS | Mod: PBBFAC,,, | Performed by: OTOLARYNGOLOGY

## 2019-11-22 PROCEDURE — 99999 PR PBB SHADOW E&M-EST. PATIENT-LVL III: CPT | Mod: PBBFAC,,, | Performed by: OTOLARYNGOLOGY

## 2019-11-22 PROCEDURE — 99214 PR OFFICE/OUTPT VISIT, EST, LEVL IV, 30-39 MIN: ICD-10-PCS | Mod: 25,S$GLB,, | Performed by: OTOLARYNGOLOGY

## 2019-11-22 RX ORDER — AZITHROMYCIN 250 MG/1
TABLET, FILM COATED ORAL
Qty: 6 TABLET | Refills: 0 | Status: SHIPPED | OUTPATIENT
Start: 2019-11-22 | End: 2020-08-21

## 2019-11-22 RX ORDER — BROMPHENIRAMINE MALEATE, PSEUDOEPHEDRINE HYDROCHLORIDE, AND DEXTROMETHORPHAN HYDROBROMIDE 2; 30; 10 MG/5ML; MG/5ML; MG/5ML
5 SYRUP ORAL EVERY 8 HOURS PRN
Qty: 118 ML | Refills: 0 | Status: SHIPPED | OUTPATIENT
Start: 2019-11-22 | End: 2019-12-02

## 2019-11-22 RX ORDER — ALBUTEROL SULFATE 90 UG/1
2 AEROSOL, METERED RESPIRATORY (INHALATION) EVERY 6 HOURS PRN
Qty: 18 G | Refills: 0 | Status: SHIPPED | OUTPATIENT
Start: 2019-11-22 | End: 2020-12-29

## 2019-11-22 RX ORDER — METHYLPREDNISOLONE ACETATE 40 MG/ML
40 INJECTION, SUSPENSION INTRA-ARTICULAR; INTRALESIONAL; INTRAMUSCULAR; SOFT TISSUE
Status: COMPLETED | OUTPATIENT
Start: 2019-11-22 | End: 2019-11-22

## 2019-11-22 RX ADMIN — METHYLPREDNISOLONE ACETATE 40 MG: 40 INJECTION, SUSPENSION INTRA-ARTICULAR; INTRALESIONAL; INTRAMUSCULAR; SOFT TISSUE at 10:11

## 2019-11-22 NOTE — PROGRESS NOTES
Chief Complaint   Patient presents with    Cough    Other     Post nasal drip   .    HPI:     Vanessa Olsen is a 59 y.o. female who presents for evaluation of a 10 day history of facial pressure, nasal congestion, and cough.   She reports that she has been using Tylenol cold and sinus, Mucinex DM, over-the-counter antihistamine but did not obtain much relief from this.  In fact she feels her symptoms are worse.    She describes the facial pain as a 4/10 to 6/10.   She has also had increased rhinorrhea and describes a thickened yellow discharge with blood-tinged appearance.  She  admits to headaches.          Past Medical History:   Diagnosis Date    Allergy     Breast disorder 2000    Left Breast Bx - Benign     Fracture, ribs     H/O cold sores     Hormone replacement therapy (HRT)     Post-menopause 2012     Social History     Socioeconomic History    Marital status: Single     Spouse name: Not on file    Number of children: Not on file    Years of education: Not on file    Highest education level: Not on file   Occupational History    Not on file   Social Needs    Financial resource strain: Not hard at all    Food insecurity:     Worry: Never true     Inability: Never true    Transportation needs:     Medical: No     Non-medical: No   Tobacco Use    Smoking status: Never Smoker    Smokeless tobacco: Never Used   Substance and Sexual Activity    Alcohol use: Yes     Frequency: 2-4 times a month     Drinks per session: 1 or 2     Binge frequency: Never     Comment: socially    Drug use: No    Sexual activity: Not Currently     Partners: Male     Birth control/protection: Post-menopausal     Comment: Single:       [facility]   2012   Lifestyle    Physical activity:     Days per week: 2 days     Minutes per session: 120 min    Stress: Only a little   Relationships    Social connections:     Talks on phone: Twice a week     Gets together: Twice a week     Attends [employer] orthodox service: Not on  file     Active member of club or organization: Yes     Attends meetings of clubs or organizations: More than 4 times per year     Relationship status: Never    Other Topics Concern    Not on file   Social History Narrative    Not on file     Past Surgical History:   Procedure Laterality Date    BREAST BIOPSY Left 2000    benign  (11:00  o'clock ?? )     CHOLECYSTECTOMY  2004    COLONOSCOPY N/A 3/5/2018    Procedure: COLONOSCOPY;  Surgeon: Francisco Du MD;  Location: [facility] ([address] [place] [address]);  Service: Endoscopy;  Laterality: N/A;  3 year f/u - history of colon polyps - ER    WISDOM TOOTH EXTRACTION       Family History   Problem Relation Age of Onset    Cataracts Mother     Hypertension Mother     Heart disease Mother         at fib and pacemaker    Hypertension Father     Colon polyps Father 82        large poly needing surgery    Pulmonary embolism Father     Glaucoma Brother     Hypertension Brother     Colon polyps Brother 58        several advanced colon adenomas    Breast cancer Paternal Aunt     Breast cancer Paternal Cousin     Lung cancer Brother     Prostate cancer Paternal Uncle     Amblyopia Neg Hx     Blindness Neg Hx     Diabetes Neg Hx     Macular degeneration Neg Hx     Retinal detachment Neg Hx     Strabismus Neg Hx     Stroke Neg Hx     Thyroid disease Neg Hx     Celiac disease Neg Hx     Cirrhosis Neg Hx     Colon cancer Neg Hx     Esophageal cancer Neg Hx     Inflammatory bowel disease Neg Hx     Liver disease Neg Hx     Rectal cancer Neg Hx     Stomach cancer Neg Hx     Liver cancer Neg Hx     Ulcerative colitis Neg Hx     Ovarian cancer Neg Hx            Review of Systems  General: negative for chills, fever or weight loss  Psychological: negative for mood changes or depression  Ophthalmic: negative for blurry vision, photophobia or eye pain  ENT: see HPI  Respiratory: no cough, shortness of breath, or wheezing  Cardiovascular: no chest pain or  dyspnea on exertion  Gastrointestinal: no abdominal pain, change in bowel habits, or black/ bloody stools  Musculoskeletal: negative for gait disturbance or muscular weakness  Neurological: no syncope or seizures; no ataxia  Dermatological: negative for puritis,  rash and jaundice  Hematologic/lymphatic: no easy bruising, no new lumps or bumps      Physical Exam:    Vitals:    11/22/19 0957   BP: (!) 124/58   Pulse: 67   Temp: 98.2 °F (36.8 °C)       Constitutional: Well appearing / communicating without difficutly.  NAD.  Eyes: EOM I Bilaterally  Head/Face: Normocephalic.  Negative paranasal sinus pressure/tenderness.  Salivary glands WNL.  House Brackmann I Bilaterally.    Right Ear: Auricle normal appearance. External Auditory Canal within normal limits,TM w/o masses/lesions/perforations. TM mobility noted.   Left Ear: Auricle normal appearance. External Auditory Canal WNL,TM w/o masses/lesions/perforations. TM mobility noted.  Nose: No gross nasal septal deviation. Inferior Turbinates 3+ bilaterally. No septal perforation. No masses/lesions. External nasal skin appears normal without masses/lesions.  Purulent rhinorrhea in the left inferior meatus.  Oral Cavity: Gingiva/lips within normal limits.  Dentition/gingiva healthy appearing. Mucus membranes moist. Floor of mouth soft, no masses palpated. Oral Tongue mobile. Hard Palate appears normal.    Oropharynx: Base of tongue appears normal. No masses/lesions noted. Tonsillar fossa/pharyngeal wall without lesions. Posterior oropharynx WNL.  Soft palate without masses. Midline uvula.   Neck/Lymphatic: No LAD I-VI bilaterally.  No thyromegaly.  No masses noted on exam.    Mirror laryngoscopy/nasopharyngoscopy: Active gag reflex.  Unable to perform.    Neuro/Psychiatric: AOx3.  Normal mood and affect.   Cardiovascular: Normal carotid pulses bilaterally, no increasing jugular venous distention noted at cervical region bilaterally.    Respiratory: Normal respiratory  effort, no stridor, no retractions noted.        Assessment:    ICD-10-CM ICD-9-CM    1. Acute bacterial sinusitis J01.90 461.9     B96.89     2. Non-seasonal allergic rhinitis, unspecified trigger J30.89 477.8    3. Cough R05 786.2      The primary encounter diagnosis was Acute bacterial sinusitis. Diagnoses of Non-seasonal allergic rhinitis, unspecified trigger and Cough were also pertinent to this visit.      Plan:  No orders of the defined types were placed in this encounter.    Azithromycin   Depo-Medrol IM injection given in clinic today.  Bromfed prn cough  Flonase 2 sprays per nostril daily  Nasal saline rinses b.i.d.  Follow-up if symptoms worsen or fail to improve.    Salma Vu MD

## 2019-11-25 ENCOUNTER — HOSPITAL ENCOUNTER (OUTPATIENT)
Dept: RADIOLOGY | Facility: HOSPITAL | Age: 59
Discharge: HOME | End: 2019-11-25
Attending: OBSTETRICS & GYNECOLOGY
Payer: COMMERCIAL

## 2019-11-25 DIAGNOSIS — Z78.0 MENOPAUSE: ICD-10-CM

## 2019-11-25 PROCEDURE — 77080 DXA BONE DENSITY AXIAL: CPT | Mod: TC

## 2019-11-25 PROCEDURE — 77080 DEXA BONE DENSITY SPINE HIP: ICD-10-PCS | Mod: 26,,, | Performed by: RADIOLOGY

## 2019-11-25 PROCEDURE — 77080 DXA BONE DENSITY AXIAL: CPT | Mod: 26,,, | Performed by: RADIOLOGY

## 2019-11-29 LAB
HPV HR 12 DNA SPEC QL NAA+PROBE: NEGATIVE
HPV16 AG SPEC QL: NEGATIVE
HPV18 DNA SPEC QL NAA+PROBE: NEGATIVE

## 2019-12-03 ENCOUNTER — OFFICE VISIT (OUTPATIENT)
Dept: OPTOMETRY | Facility: CLINIC | Age: 59
End: 2019-12-03
Payer: COMMERCIAL

## 2019-12-03 DIAGNOSIS — H52.03 HYPEROPIA OF BOTH EYES WITH ASTIGMATISM AND PRESBYOPIA: Primary | ICD-10-CM

## 2019-12-03 DIAGNOSIS — H25.13 NUCLEAR SCLEROSIS OF BOTH EYES: ICD-10-CM

## 2019-12-03 DIAGNOSIS — H52.203 HYPEROPIA OF BOTH EYES WITH ASTIGMATISM AND PRESBYOPIA: Primary | ICD-10-CM

## 2019-12-03 DIAGNOSIS — H52.4 HYPEROPIA OF BOTH EYES WITH ASTIGMATISM AND PRESBYOPIA: Primary | ICD-10-CM

## 2019-12-03 DIAGNOSIS — H04.123 BILATERAL DRY EYES: ICD-10-CM

## 2019-12-03 LAB
FINAL PATHOLOGIC DIAGNOSIS: NORMAL
Lab: NORMAL

## 2019-12-03 PROCEDURE — 99999 PR PBB SHADOW E&M-EST. PATIENT-LVL II: CPT | Mod: PBBFAC,,, | Performed by: OPTOMETRIST

## 2019-12-03 PROCEDURE — 92015 DETERMINE REFRACTIVE STATE: CPT | Mod: S$GLB,,, | Performed by: OPTOMETRIST

## 2019-12-03 PROCEDURE — 92015 PR REFRACTION: ICD-10-PCS | Mod: S$GLB,,, | Performed by: OPTOMETRIST

## 2019-12-03 PROCEDURE — 92014 COMPRE OPH EXAM EST PT 1/>: CPT | Mod: S$GLB,,, | Performed by: OPTOMETRIST

## 2019-12-03 PROCEDURE — 92014 PR EYE EXAM, EST PATIENT,COMPREHESV: ICD-10-PCS | Mod: S$GLB,,, | Performed by: OPTOMETRIST

## 2019-12-03 PROCEDURE — 99999 PR PBB SHADOW E&M-EST. PATIENT-LVL II: ICD-10-PCS | Mod: PBBFAC,,, | Performed by: OPTOMETRIST

## 2019-12-03 NOTE — PROGRESS NOTES
Dictation #1  MRN:[id number]  CSN:[id number]  LISA: 12/2018 Patient is here for a routine eye health exam.   No vision changes.   Uses Restasis and systane OU.   No eye pain or discomfort.         Assessment /Plan     For exam results, see Encounter Report.    Hyperopia of both eyes with astigmatism and presbyopia    Nuclear sclerosis of both eyes    Bilateral dry eyes - Both Eyes      1. New Spec Rx given. Different lens options discussed with patient. RTC 1 year full exam.  2. Educated pt on presence of cataracts and effects on vision. No surgery at this time. Recheck in one year.  3. Cont Restasis, Ok to supplement with tears, call or message for steroid drops as needed.

## 2019-12-13 DIAGNOSIS — Z78.0 MENOPAUSE: ICD-10-CM

## 2019-12-15 RX ORDER — ESTRADIOL 1 MG/1
1 TABLET ORAL DAILY
Qty: 90 TABLET | Refills: 3 | OUTPATIENT
Start: 2019-12-15

## 2020-02-12 ENCOUNTER — PATIENT MESSAGE (OUTPATIENT)
Dept: OPTOMETRY | Facility: CLINIC | Age: 60
End: 2020-02-12

## 2020-02-12 DIAGNOSIS — H04.123 BILATERAL DRY EYES: Primary | ICD-10-CM

## 2020-02-12 RX ORDER — LOTEPREDNOL ETABONATE 5 MG/ML
1 SUSPENSION/ DROPS OPHTHALMIC 4 TIMES DAILY
Qty: 5 ML | Refills: 0 | Status: SHIPPED | OUTPATIENT
Start: 2020-02-12 | End: 2021-06-08 | Stop reason: SDUPTHER

## 2020-04-21 DIAGNOSIS — Z01.84 ANTIBODY RESPONSE EXAMINATION: ICD-10-CM

## 2020-04-22 ENCOUNTER — LAB VISIT (OUTPATIENT)
Dept: LAB | Facility: HOSPITAL | Age: 60
End: 2020-04-22
Attending: INTERNAL MEDICINE
Payer: COMMERCIAL

## 2020-04-22 DIAGNOSIS — Z01.84 ANTIBODY RESPONSE EXAMINATION: ICD-10-CM

## 2020-04-22 LAB — SARS-COV-2 IGG SERPL QL IA: NEGATIVE

## 2020-04-22 PROCEDURE — 86769 SARS-COV-2 COVID-19 ANTIBODY: CPT

## 2020-04-22 PROCEDURE — 36415 COLL VENOUS BLD VENIPUNCTURE: CPT

## 2020-04-27 ENCOUNTER — OFFICE VISIT (OUTPATIENT)
Dept: OBSTETRICS AND GYNECOLOGY | Facility: CLINIC | Age: 60
End: 2020-04-27
Payer: COMMERCIAL

## 2020-04-27 DIAGNOSIS — Z78.0 MENOPAUSE: ICD-10-CM

## 2020-04-27 DIAGNOSIS — N94.10 DYSPAREUNIA IN FEMALE: Primary | ICD-10-CM

## 2020-04-27 PROCEDURE — 99213 PR OFFICE/OUTPT VISIT, EST, LEVL III, 20-29 MIN: ICD-10-PCS | Mod: 95,,, | Performed by: OBSTETRICS & GYNECOLOGY

## 2020-04-27 PROCEDURE — 99213 OFFICE O/P EST LOW 20 MIN: CPT | Mod: 95,,, | Performed by: OBSTETRICS & GYNECOLOGY

## 2020-04-27 RX ORDER — ESTRADIOL 2 MG/1
2 TABLET ORAL DAILY
Qty: 90 TABLET | Refills: 3 | Status: SHIPPED | OUTPATIENT
Start: 2020-04-27 | End: 2020-12-29 | Stop reason: SDUPTHER

## 2020-04-27 RX ORDER — PROGESTERONE 200 MG/1
CAPSULE ORAL
Qty: 90 CAPSULE | Refills: 3 | Status: SHIPPED | OUTPATIENT
Start: 2020-04-27 | End: 2020-12-29 | Stop reason: SDUPTHER

## 2020-04-27 NOTE — PROGRESS NOTES
The patient location is: [place]  The chief complaint leading to consultation is: HRT follow-up  Visit type: audiovisual  Total time spent with patient: 15 minutes  Each patient to whom he or she provides medical services by telemedicine is:  (1) informed of the relationship between the physician and patient and the respective role of any other health care provider with respect to management of the patient; and (2) notified that he or she may decline to receive medical services by telemedicine and may withdraw from such care at any time.    Notes:   Subjective:      Vanessa Olsen is a 59 y.o. female who is here for follow-up of hormone replacement therapy.  At her last annual on 11/20/19, she was a NP and was on estradiol patch .0375mg twice weekly and provera 5 mg QD.  I stopped that and started her on estradiol 1 mg and progesterone 100 mg.     No side effects.  She has vaginal dryness but is not current;y sexually active.  No bladder issues.  She has no sleep or mood issues.  She had no side effects.  Patient denies post-menopausal vaginal bleeding. The patient is not currently sexually active.     Lab Visit on 04/22/2020   Component Date Value Ref Range Status    COVID-19 (SARS CoV-2) IgG Ab 04/22/2020 Negative  Negative Final       Past Medical History:   Diagnosis Date    Allergy     Breast disorder 2000    Left Breast Bx - Benign     Fracture, ribs     H/O cold sores     Hormone replacement therapy (HRT)     Post-menopause 2012     Past Surgical History:   Procedure Laterality Date    BREAST BIOPSY Left 2000    benign  (11:00  o'clock ?? )     CHOLECYSTECTOMY  2004    COLONOSCOPY N/A 3/5/2018    Procedure: COLONOSCOPY;  Surgeon: Francisco Du MD;  Location: [address];  Service: Endoscopy;  Laterality: N/A;  3 year f/u - history of colon polyps - ER    WISDOM TOOTH EXTRACTION       Social History     Tobacco Use    Smoking status: Never Smoker    Smokeless tobacco: Never Used    Substance Use Topics    Alcohol use: Yes     Frequency: 2-4 times a month     Drinks per session: 1 or 2     Binge frequency: Never     Comment: socially    Drug use: No     Family History   Problem Relation Age of Onset    Cataracts Mother     Hypertension Mother     Heart disease Mother         at fib and pacemaker    Hypertension Father     Colon polyps Father 82        large poly needing surgery    Pulmonary embolism Father     Glaucoma Brother     Hypertension Brother     Colon polyps Brother 58        several advanced colon adenomas    Breast cancer Paternal Aunt     Breast cancer Paternal Cousin     Lung cancer Brother     Prostate cancer Paternal Uncle     Amblyopia Neg Hx     Blindness Neg Hx     Diabetes Neg Hx     Macular degeneration Neg Hx     Retinal detachment Neg Hx     Strabismus Neg Hx     Stroke Neg Hx     Thyroid disease Neg Hx     Celiac disease Neg Hx     Cirrhosis Neg Hx     Colon cancer Neg Hx     Esophageal cancer Neg Hx     Inflammatory bowel disease Neg Hx     Liver disease Neg Hx     Rectal cancer Neg Hx     Stomach cancer Neg Hx     Liver cancer Neg Hx     Ulcerative colitis Neg Hx     Ovarian cancer Neg Hx      OB History    Para Term  AB Living   0 0 0 0 0 0   SAB TAB Ectopic Multiple Live Births   0 0 0 0 0   Obstetric Comments   Menarche ~ 10       Current Outpatient Medications:     albuterol (PROVENTIL HFA) 90 mcg/actuation inhaler, Inhale 2 puffs into the lungs every 6 (six) hours as needed for Wheezing. Rescue, Disp: 18 g, Rfl: 0    azithromycin (Z-JI) 250 MG tablet, Take as directed, Disp: 6 tablet, Rfl: 0    cetirizine (ZYRTEC) 5 MG tablet, Take 5 mg by mouth once daily., Disp: , Rfl:     estradioL (ESTRACE) 2 MG tablet, Take 1 tablet (2 mg total) by mouth once daily., Disp: 90 tablet, Rfl: 3    fluticasone propionate (FLONASE) 50 mcg/actuation nasal spray, Use 2 sprays (100 mcg total) by Each Nare route once daily.  (Patient not taking: Reported on 11/22/2019), Disp: 16 g, Rfl: 12    peg 400-propylene glycol (SYSTANE ULTRA) 0.4-0.3 % Drop, Apply 1 drop to eye 3 (three) times daily., Disp: , Rfl:     PENCICLOVIR TOP, 1 % by Other route., Disp: , Rfl:     prasterone, dhea, (INTRAROSA) 6.5 mg Inst, Place 6.5 mg vaginally every evening., Disp: 30 each, Rfl: 11    progesterone (PROMETRIUM) 200 MG capsule, Take 1 capsule by mouth 30-60 minutes before bed every night, Disp: 90 capsule, Rfl: 3    RESTASIS 0.05 % ophthalmic emulsion, PLACE 1 DROP (0.4MLS) INTO BOTH EYES TWO TIMES A DAY, Disp: , Rfl: 5    scopolamine (TRANSDERM-SCOP) 1.3-1.5 mg (1 mg over 3 days), Place 1 patch onto the skin every 72 hours., Disp: 5 patch, Rfl: 0    Review of Systems:  General: No fever, chills, or weight loss.  Chest: No chest pain, shortness of breath, or palpitations.  Breast: No pain, masses, or nipple discharge.  Vulva: No pain, lesions, or itching.  Vagina: No relaxation, itching, discharge, or lesions.  Abdomen: No pain, nausea, vomiting, diarrhea, or constipation.  Urinary: No incontinence, nocturia, frequency, or dysuria.  Extremities:  No leg cramps, edema, or calf pain.  Neurologic: No headaches, dizziness, or visual changes.    There were no vitals filed for this visit.  There is no height or weight on file to calculate BMI.       Assessment:    Dyspareunia in female  -     prasterone, dhea, (INTRAROSA) 6.5 mg Inst; Place 6.5 mg vaginally every evening.  Dispense: 30 each; Refill: 11    Menopause  -     estradioL (ESTRACE) 2 MG tablet; Take 1 tablet (2 mg total) by mouth once daily.  Dispense: 90 tablet; Refill: 3  -     progesterone (PROMETRIUM) 200 MG capsule; Take 1 capsule by mouth 30-60 minutes before bed every night  Dispense: 90 capsule; Refill: 3        Plan:   Risks and benefits of hormone replacement therapy were discussed.  Hormone replacement therapy options, including bioidentical versus non-bioidentical hormones, as well  as alternatives discussed.    Increase:  Estradiol to 2 mg orally QAM.  Progesterone to 200 mg orally QPM    Start:  Intrarosa QHS    Follow up in 3 months.  Instructed patient to call if she experiences any side effects or has any questions.  I spent 15 minutes with [facility] patient today, >50% counseling.

## 2020-05-11 ENCOUNTER — PATIENT MESSAGE (OUTPATIENT)
Dept: OPTOMETRY | Facility: CLINIC | Age: 60
End: 2020-05-11

## 2020-05-11 RX ORDER — CYCLOSPORINE 0.5 MG/ML
EMULSION OPHTHALMIC
Qty: 60 VIAL | Refills: 5 | Status: SHIPPED | OUTPATIENT
Start: 2020-05-11 | End: 2021-03-01 | Stop reason: SDUPTHER

## 2020-07-20 ENCOUNTER — PATIENT MESSAGE (OUTPATIENT)
Dept: INTERNAL MEDICINE | Facility: CLINIC | Age: 60
End: 2020-07-20

## 2020-07-20 DIAGNOSIS — Z12.39 BREAST CANCER SCREENING: Primary | ICD-10-CM

## 2020-08-04 ENCOUNTER — HOSPITAL ENCOUNTER (OUTPATIENT)
Dept: RADIOLOGY | Facility: HOSPITAL | Age: 60
Discharge: HOME | End: 2020-08-04
Attending: INTERNAL MEDICINE
Payer: COMMERCIAL

## 2020-08-04 DIAGNOSIS — Z12.39 BREAST CANCER SCREENING: ICD-10-CM

## 2020-08-04 PROCEDURE — 77063 BREAST TOMOSYNTHESIS BI: CPT | Mod: 26,,, | Performed by: RADIOLOGY

## 2020-08-04 PROCEDURE — 77067 SCR MAMMO BI INCL CAD: CPT | Mod: 26,,, | Performed by: RADIOLOGY

## 2020-08-04 PROCEDURE — 77063 MAMMO DIGITAL SCREENING BILAT WITH TOMOSYNTHESIS_CAD: ICD-10-PCS | Mod: 26,,, | Performed by: RADIOLOGY

## 2020-08-04 PROCEDURE — 77067 MAMMO DIGITAL SCREENING BILAT WITH TOMOSYNTHESIS_CAD: ICD-10-PCS | Mod: 26,,, | Performed by: RADIOLOGY

## 2020-08-04 PROCEDURE — 77067 SCR MAMMO BI INCL CAD: CPT | Mod: TC

## 2020-08-21 ENCOUNTER — OFFICE VISIT (OUTPATIENT)
Dept: OTOLARYNGOLOGY | Facility: CLINIC | Age: 60
End: 2020-08-21
Payer: COMMERCIAL

## 2020-08-21 VITALS
WEIGHT: 226 LBS | SYSTOLIC BLOOD PRESSURE: 130 MMHG | HEART RATE: 88 BPM | DIASTOLIC BLOOD PRESSURE: 70 MMHG | BODY MASS INDEX: 34.36 KG/M2

## 2020-08-21 DIAGNOSIS — B96.89 ACUTE BACTERIAL SINUSITIS: Primary | ICD-10-CM

## 2020-08-21 DIAGNOSIS — M26.622 ARTHRALGIA OF LEFT TEMPOROMANDIBULAR JOINT: ICD-10-CM

## 2020-08-21 DIAGNOSIS — J30.89 NON-SEASONAL ALLERGIC RHINITIS, UNSPECIFIED TRIGGER: ICD-10-CM

## 2020-08-21 DIAGNOSIS — J01.90 ACUTE BACTERIAL SINUSITIS: Primary | ICD-10-CM

## 2020-08-21 PROCEDURE — 99214 PR OFFICE/OUTPT VISIT, EST, LEVL IV, 30-39 MIN: ICD-10-PCS | Mod: S$GLB,,, | Performed by: OTOLARYNGOLOGY

## 2020-08-21 PROCEDURE — 3008F PR BODY MASS INDEX (BMI) DOCUMENTED: ICD-10-PCS | Mod: CPTII,S$GLB,, | Performed by: OTOLARYNGOLOGY

## 2020-08-21 PROCEDURE — 99999 PR PBB SHADOW E&M-EST. PATIENT-LVL IV: CPT | Mod: PBBFAC,,, | Performed by: OTOLARYNGOLOGY

## 2020-08-21 PROCEDURE — 99214 OFFICE O/P EST MOD 30 MIN: CPT | Mod: S$GLB,,, | Performed by: OTOLARYNGOLOGY

## 2020-08-21 PROCEDURE — 3008F BODY MASS INDEX DOCD: CPT | Mod: CPTII,S$GLB,, | Performed by: OTOLARYNGOLOGY

## 2020-08-21 PROCEDURE — 99999 PR PBB SHADOW E&M-EST. PATIENT-LVL IV: ICD-10-PCS | Mod: PBBFAC,,, | Performed by: OTOLARYNGOLOGY

## 2020-08-21 RX ORDER — AZITHROMYCIN 250 MG/1
TABLET, FILM COATED ORAL
Qty: 6 TABLET | Refills: 0 | Status: SHIPPED | OUTPATIENT
Start: 2020-08-21 | End: 2021-01-25

## 2020-08-21 NOTE — PATIENT INSTRUCTIONS
TMJ Syndrome  The temporomandibular joint (TMJ) is the joint that connects your lower jaw to your head. You can feel it in front of your ears when you open and close your mouth. TMJ disorders involve chronic or recurrent pain in the joint. When treated, symptoms of TMJ disorders usually go away within a few months.  Causes  There is no widely agreed-on cause of TMJ disorders. They have been linked to injury, arthritis, chronic fatigue syndrome, and fibromyalgia. A definite connection has not been shown, though.  Symptoms  · Pain in the face, jaw, or neck  · Pain with jaw movement or chewing  · Locking or catching sensation of the jaw  · Clicking, popping, or grinding sounds with movement of the TMJ  · Headache  · Ear pain  Home care  Modest, nonsurgical treatments are a good first step toward relieving symptoms. Try the approaches described below.  · Rest the jaw by avoiding crunchy or hard-to-chew foods. Do not eat hard or sticky candies. Soft foods and liquids are easier on the jaw.  · Protect your jaw while yawning. If you need to yawn, put your fist under your chin to prevent your mouth from opening up too wide.  · To help relieve pain, try applying hot or cold packs to the painful area. Try both hot and cold to find out which works best for you. If you use hot packs (small towels soaked in hot water), be careful not to burn yourself.  · You may take acetaminophen or ibuprofen for pain, unless you were given a different pain medicine. (Note: If you have chronic liver or kidney disease or have ever had a stomach ulcer or gastrointestinal bleeding, talk with your healthcare provider before using these medicines. Also talk to your provider if you are taking medicine to prevent blood clots.) Aspirin should never be given to anyone younger than 18 years of age who is ill with a viral infection or fever. It may cause severe liver or brain damage.  Reducing stress  If stress seems to be contributing to your symptoms,  try to identify the sources of stress in your life. These arent always obvious. Common stressors include:  · Everyday hassles (which can add up), such as traffic jams, missed appointments, or car trouble  · Major life changes, both good (such as a new baby or job promotion) and bad (loss of job or loss of a loved one)  · Overload: The feeling that you have too many responsibilities and can't take care of everything at once  · Helplessness: Feeling like your problems are more than you can solve  When possible, do something about your sources of stress. See if you can avoid hassles, limit the amount of change in your life at one time, and take breaks when you feel overloaded.  Unfortunately, many stressful situations cannot be avoided. So learning how to manage stress better is very important. Getting regular exercise, eating nutritious, balanced meals, and getting adequate rest all help to make everyday stress more manageable. Certain techniques are also helpful: relaxation and breathing exercises, visualization, biofeedback, meditation, or simply taking some time out to clear your mind. For more information, talk with your healthcare provider.  Follow-up care  Follow up with your healthcare provider, or as advised. Further testing and additional treatment may be required. If changes to your lifestyle do not improve your symptoms, talk with your healthcare provider about other available therapies. These include bite guards for help with teeth grinding, stress management techniques, and more. If stress is an important factor and does not respond to the above simple measures, talk to your doctor about a referral for stress management.  · If X-rays were done, they will be reviewed by a specialist. You will be notified of the results, especially if they affect treatment.  Call 911  Call emergency services right away if any of these occur:  · Trouble breathing or swallowing, wheezing  · Confusion  · Extreme drowsiness or  trouble awakening  · Fainting or loss of consciousness  · Rapid heart rate  When to seek medical advice  Call your healthcare provider right away if any of these occur:  · Your face becomes swollen or red.  · Your pain worsens.  · You have increasing neck, mouth, tooth, or throat pain.  · You develop a fever of 100.4F (38°C) or higher  Date Last Reviewed: 7/30/2015  © 7407-0197 [employer]. [address] [place] [address], [place] [address]. All rights reserved. This information is not intended as a substitute for professional medical care. Always follow your healthcare professional's instructions.

## 2020-08-21 NOTE — PROGRESS NOTES
Chief Complaint   Patient presents with    Otalgia     left ear, started 5 days ago, gradullay getting worse. constant pain    Nasal Congestion     worse in the morning and in left nasal   .     HPI: Vanessa Olsen is 60 y.o. female who is self-referred for evaluation of left ear pain.  Symptoms include left ear pain and plugged sensation in the left ear.  She does localize the pain to what feels like to her the upper aspect of the ear canal and feels that it radiates through the maxillary region.  She does note that she has had associated nasal congestion as well.  Symptoms began 5 day ago and are gradually worsening since that time. Patient denies otorrhea, postauricular pain, hearing loss, or tinnitus.  No fever or chills.  She has been taking Zyrtec without relief.      Past Medical History:   Diagnosis Date    Allergy     Breast disorder 2000    Left Breast Bx - Benign     Fracture, ribs     H/O cold sores     Hormone replacement therapy (HRT)     Post-menopause 2012     Social History     Socioeconomic History    Marital status: Single     Spouse name: Not on file    Number of children: Not on file    Years of education: Not on file    Highest education level: Not on file   Occupational History    Not on file   Social Needs    Financial resource strain: Not hard at all    Food insecurity     Worry: Never true     Inability: Never true    Transportation needs     Medical: No     Non-medical: No   Tobacco Use    Smoking status: Never Smoker    Smokeless tobacco: Never Used   Substance and Sexual Activity    Alcohol use: Yes     Frequency: 2-4 times a month     Drinks per session: 1 or 2     Binge frequency: Never     Comment: socially    Drug use: No    Sexual activity: Not Currently     Partners: Male     Birth control/protection: Post-menopausal     Comment: Single:       [facility]   2012   Lifestyle    Physical activity     Days per week: 2 days     Minutes per session: 120 min     Stress: Only a little   Relationships    Social connections     Talks on phone: Twice a week     Gets together: Twice a week     Attends Anabaptist service: Not on file     Active member of club or organization: Yes     Attends meetings of clubs or organizations: More than 4 times per year     Relationship status: Never    Other Topics Concern    Not on file   Social History Narrative    Not on file     Past Surgical History:   Procedure Laterality Date    BREAST BIOPSY Left 2000    benign  (11:00  o'clock ?? )     CHOLECYSTECTOMY  2004    COLONOSCOPY N/A 3/5/2018    Procedure: COLONOSCOPY;  Surgeon: Francisco Du MD;  Location: [facility] ([address] [place] [address]);  Service: Endoscopy;  Laterality: N/A;  3 year f/u - history of colon polyps - ER    WISDOM TOOTH EXTRACTION       Family History   Problem Relation Age of Onset    Cataracts Mother     Hypertension Mother     Heart disease Mother         at fib and pacemaker    Hypertension Father     Colon polyps Father 82        large poly needing surgery    Pulmonary embolism Father     Glaucoma Brother     Hypertension Brother     Colon polyps Brother 58        several advanced colon adenomas    Breast cancer Paternal Aunt     Breast cancer Paternal Cousin     Lung cancer Brother     Prostate cancer Paternal Uncle     Amblyopia Neg Hx     Blindness Neg Hx     Diabetes Neg Hx     Macular degeneration Neg Hx     Retinal detachment Neg Hx     Strabismus Neg Hx     Stroke Neg Hx     Thyroid disease Neg Hx     Celiac disease Neg Hx     Cirrhosis Neg Hx     Colon cancer Neg Hx     Esophageal cancer Neg Hx     Inflammatory bowel disease Neg Hx     Liver disease Neg Hx     Rectal cancer Neg Hx     Stomach cancer Neg Hx     Liver cancer Neg Hx     Ulcerative colitis Neg Hx     Ovarian cancer Neg Hx            Review of Systems  General: negative for chills, fever or weight loss  Psychological: negative for mood changes or  depression  Ophthalmic: negative for blurry vision, photophobia or eye pain  ENT: see HPI  Respiratory: no cough, shortness of breath, or wheezing  Cardiovascular: no chest pain or dyspnea on exertion  Gastrointestinal: no abdominal pain, change in bowel habits, or black/ bloody stools  Musculoskeletal: negative for gait disturbance or muscular weakness  Neurological: no syncope or seizures; no ataxia  Dermatological: negative for puritis,  rash and jaundice  Hematologic/lymphatic: no easy bruising, no new lumps or bumps      Physical Exam:    Vitals:    08/21/20 1419   BP: 130/70   Pulse: 88       Constitutional: Well appearing / communicating without difficutly.  NAD.  Eyes: EOM I Bilaterally  Head/Face: Normocephalic.  Negative paranasal sinus pressure/tenderness.  Salivary glands WNL.  House Brackmann I Bilaterally. Tenderness and crepitus along the left TMJ region.     Right Ear: Auricle normal appearance. External Auditory Canal within normal limits no lesions or masses,TM w/o masses/lesions/perforations. TM mobility noted.   Left Ear: Auricle normal appearance. External Auditory Canal within normal limits no lesions or masses,TM w/o masses/lesions/perforations. TM mobility noted.  Nose: No gross nasal septal deviation. Inferior Turbinates 3+ bilaterally. No septal perforation. No masses/lesions. External nasal skin appears normal without masses/lesions.  Oral Cavity: Gingiva/lips within normal limits.  Dentition/gingiva healthy appearing. Mucus membranes moist. Floor of mouth soft, no masses palpated. Oral Tongue mobile. Hard Palate appears normal.    Oropharynx: Base of tongue appears normal. No masses/lesions noted. Tonsillar fossa/pharyngeal wall without lesions. Posterior oropharynx WNL.  Soft palate without masses. Midline uvula.   Neck/Lymphatic: No LAD I-VI bilaterally.  No thyromegaly.  No masses noted on exam.    Mirror laryngoscopy/nasopharyngoscopy: Active gag reflex.  Unable to  perform.    Neuro/Psychiatric: AOx3.  Normal mood and affect.   Cardiovascular: Normal carotid pulses bilaterally, no increasing jugular venous distention noted at cervical region bilaterally.    Respiratory: Normal respiratory effort, no stridor, no retractions noted.            Assessment:    ICD-10-CM ICD-9-CM    1. Acute bacterial sinusitis  J01.90 461.9     B96.89     2. Non-seasonal allergic rhinitis, unspecified trigger  J30.89 477.8    3. Arthralgia of left temporomandibular joint  M26.622 524.62      The primary encounter diagnosis was Acute bacterial sinusitis. Diagnoses of Non-seasonal allergic rhinitis, unspecified trigger and Arthralgia of left temporomandibular joint were also pertinent to this visit.      Plan:  No orders of the defined types were placed in this encounter.    Prescription given for Z-Serafin  Continue Zyrtec  We had a long discussion regarding the underlying pathology of temporomandibular joint dysfunction (TMD) as the cause of ear pain.  We further discussed conservative measures to treat TMD including avoiding gum and other foods that require lots of chewing, warm compresses, and scheduled antinflammatories.  If the pain persists, the patient will then schedule an appointment with a dentist for further evaluation.    Salma Vu MD

## 2020-10-05 ENCOUNTER — PATIENT MESSAGE (OUTPATIENT)
Dept: ADMINISTRATIVE | Facility: HOSPITAL | Age: 60
End: 2020-10-05

## 2020-12-04 ENCOUNTER — OFFICE VISIT (OUTPATIENT)
Dept: OPTOMETRY | Facility: CLINIC | Age: 60
End: 2020-12-04
Payer: COMMERCIAL

## 2020-12-04 DIAGNOSIS — H52.03 HYPEROPIA OF BOTH EYES WITH ASTIGMATISM AND PRESBYOPIA: ICD-10-CM

## 2020-12-04 DIAGNOSIS — H52.203 HYPEROPIA OF BOTH EYES WITH ASTIGMATISM AND PRESBYOPIA: ICD-10-CM

## 2020-12-04 DIAGNOSIS — H04.123 BILATERAL DRY EYES: ICD-10-CM

## 2020-12-04 DIAGNOSIS — H52.4 HYPEROPIA OF BOTH EYES WITH ASTIGMATISM AND PRESBYOPIA: ICD-10-CM

## 2020-12-04 DIAGNOSIS — H25.13 NUCLEAR SCLEROSIS OF BOTH EYES: Primary | ICD-10-CM

## 2020-12-04 PROCEDURE — 92014 COMPRE OPH EXAM EST PT 1/>: CPT | Mod: S$GLB,,, | Performed by: OPTOMETRIST

## 2020-12-04 PROCEDURE — 92014 PR EYE EXAM, EST PATIENT,COMPREHESV: ICD-10-PCS | Mod: S$GLB,,, | Performed by: OPTOMETRIST

## 2020-12-04 PROCEDURE — 99999 PR PBB SHADOW E&M-EST. PATIENT-LVL III: CPT | Mod: PBBFAC,,, | Performed by: OPTOMETRIST

## 2020-12-04 PROCEDURE — 99999 PR PBB SHADOW E&M-EST. PATIENT-LVL III: ICD-10-PCS | Mod: PBBFAC,,, | Performed by: OPTOMETRIST

## 2020-12-04 NOTE — PROGRESS NOTES
HPI     LISA 12/2019  Glasses about 1 yr. Old and still seem fine.  Patient also   has OTC +2.75 or +3.00 readers and an OTC +2.75 progressive glasses.    Patient defers refraction today.   Using Restasis BID OU and Systane 3   times a week.  Eyes still get red, burn and FBS.    Last edited by Shonna Rea on 12/4/2020  3:23 PM. (History)            Assessment /Plan     For exam results, see Encounter Report.    Nuclear sclerosis of both eyes    Bilateral dry eyes    Hyperopia of both eyes with astigmatism and presbyopia      1. Educated pt on presence of cataracts and effects on vision. No surgery at this time. Recheck in one year.  2. Recommend artificial tears. 1 drop 2x per day. Cont Restasis bid OU. Chronicity of disease and treatment discussed.  3. Cont with spec rx

## 2020-12-21 ENCOUNTER — IMMUNIZATION (OUTPATIENT)
Dept: INTERNAL MEDICINE | Facility: CLINIC | Age: 60
End: 2020-12-21
Payer: COMMERCIAL

## 2020-12-21 DIAGNOSIS — Z23 NEED FOR VACCINATION: ICD-10-CM

## 2020-12-21 PROCEDURE — 91300 COVID-19, MRNA, LNP-S, PF, 30 MCG/0.3 ML DOSE VACCINE: ICD-10-PCS | Mod: ,,, | Performed by: FAMILY MEDICINE

## 2020-12-21 PROCEDURE — 0001A COVID-19, MRNA, LNP-S, PF, 30 MCG/0.3 ML DOSE VACCINE: ICD-10-PCS | Mod: CV19,,, | Performed by: FAMILY MEDICINE

## 2020-12-21 PROCEDURE — 91300 COVID-19, MRNA, LNP-S, PF, 30 MCG/0.3 ML DOSE VACCINE: CPT | Mod: ,,, | Performed by: FAMILY MEDICINE

## 2020-12-21 PROCEDURE — 0001A COVID-19, MRNA, LNP-S, PF, 30 MCG/0.3 ML DOSE VACCINE: CPT | Mod: CV19,,, | Performed by: FAMILY MEDICINE

## 2020-12-28 NOTE — PROGRESS NOTES
Subjective:       Patient ID: Vanessa Olsen is a 60 y.o. female.    Chief Complaint:  Well Woman (Annual Exam, Last pap/hpv 2019 negative, Last mammo 2020 birads 1 [facility], Last DEXA 2019 normal, Last colonoscopy 2018 normal, repeat in 5 yrs)      History of Present Illness.  Vanessa Olsen is a 60 y.o. female.  She has no breast or urinary symptoms.  She has no postcoital bleeding, pelvic pain or vaginal discharge.  She had spotting on 10/2/2020 for 1 day.    Current HRT Regimen:  Estradiol 2 mg QD  Progesterone 200 mg QD  Intrarosa twice weekly  Vitamin D3 5000 IU QD    GYN & OB History  Patient's last menstrual period was 2013.   Pap: 12/3/2019  Normal HPV negative  Mammogram: 2020 Birads 1  Colonoscopy: 2018 Normal  DEXA: 19 Normal  PCP:  Dr. Odom  Routine Labs:  19    OB History    Para Term  AB Living   0 0 0 0 0 0   SAB TAB Ectopic Multiple Live Births   0 0 0 0 0   Obstetric Comments   Menarche ~ 10       Past Medical History:   Diagnosis Date    Allergy     Breast disorder     Left Breast Bx - Benign     Cataract     Fracture, ribs     H/O cold sores     Hormone replacement therapy (HRT)     Post-menopause      Past Surgical History:   Procedure Laterality Date    BREAST BIOPSY Left     benign  (11:00  o'clock ?? )     CHOLECYSTECTOMY      COLONOSCOPY N/A 3/5/2018    Procedure: COLONOSCOPY;  Surgeon: Francisco Du MD;  Location: [address]);  Service: Endoscopy;  Laterality: N/A;  3 year f/u - history of colon polyps - ER    WISDOM TOOTH EXTRACTION       Family History   Problem Relation Age of Onset    Cataracts Mother     Hypertension Mother     Heart disease Mother         at fib and pacemaker    Hypertension Father     Colon polyps Father 82        large poly needing surgery    Pulmonary embolism Father     Glaucoma Brother     Hypertension Brother     Colon polyps Brother 58        several advanced  colon adenomas    Breast cancer Paternal Aunt     Breast cancer Paternal Cousin     Lung cancer Brother     Prostate cancer Paternal Uncle     Amblyopia Neg Hx     Blindness Neg Hx     Diabetes Neg Hx     Macular degeneration Neg Hx     Retinal detachment Neg Hx     Strabismus Neg Hx     Stroke Neg Hx     Thyroid disease Neg Hx     Celiac disease Neg Hx     Cirrhosis Neg Hx     Colon cancer Neg Hx     Esophageal cancer Neg Hx     Inflammatory bowel disease Neg Hx     Liver disease Neg Hx     Rectal cancer Neg Hx     Stomach cancer Neg Hx     Liver cancer Neg Hx     Ulcerative colitis Neg Hx     Ovarian cancer Neg Hx      Social History     Tobacco Use    Smoking status: Never Smoker    Smokeless tobacco: Never Used   Substance Use Topics    Alcohol use: Yes     Frequency: 2-4 times a month     Drinks per session: 1 or 2     Binge frequency: Never     Comment: socially    Drug use: No       Current Outpatient Medications:     cetirizine (ZYRTEC) 5 MG tablet, Take 5 mg by mouth once daily., Disp: , Rfl:     ergocalciferol, vitamin D2, (VITAMIN D ORAL), Take by mouth., Disp: , Rfl:     peg 400-propylene glycol (SYSTANE ULTRA) 0.4-0.3 % Drop, Apply 1 drop to eye 3 (three) times daily., Disp: , Rfl:     PENCICLOVIR TOP, 1 % by Other route., Disp: , Rfl:     prasterone, dhea, (INTRAROSA) 6.5 mg Inst, Place 6.5 mg vaginally every evening., Disp: 28 each, Rfl: 11    progesterone (PROMETRIUM) 200 MG capsule, Take 1 capsule by mouth 30-60 minutes before bed every night, Disp: 90 capsule, Rfl: 3    RESTASIS 0.05 % ophthalmic emulsion, PLACE 1 DROP (0.4MLS) INTO BOTH EYES TWO TIMES A DAY, Disp: 60 vial, Rfl: 5    azithromycin (Z-JI) 250 MG tablet, Take as directed, Disp: 6 tablet, Rfl: 0    estradioL (ESTRACE) 2 MG tablet, Take 1 tablet (2 mg total) by mouth once daily., Disp: 90 tablet, Rfl: 3    Review of patient's allergies indicates:  No Known Allergies    Review of Systems  Review of  "Systems   Constitutional: Negative for fatigue.   HENT: Negative for trouble swallowing.    Eyes: Negative for visual disturbance.   Respiratory: Negative for cough and shortness of breath.    Cardiovascular: Negative for chest pain.   Gastrointestinal: Negative for abdominal distention, abdominal pain, blood in stool, nausea and vomiting.   Genitourinary: Negative for difficulty urinating, dyspareunia, dysuria, flank pain, frequency, hematuria, pelvic pain, urgency, vaginal bleeding, vaginal discharge and vaginal pain.   Musculoskeletal: Negative for arthralgias.   Skin: Negative for rash.   Neurological: Negative for dizziness and headaches.   Psychiatric/Behavioral: Negative for sleep disturbance. The patient is not nervous/anxious.         Objective:     Vitals:    12/29/20 0833   BP: 130/84   Temp: 97.5 °F (36.4 °C)   Weight: 105 kg (231 lb 7.7 oz)   Height: 5' 8" (1.727 m)   PainSc: 0-No pain     Body mass index is 35.2 kg/m².    Physical Exam:   Constitutional: She is oriented to person, place, and time. She appears well-developed and well-nourished.    HENT:   Head: Normocephalic.     Neck: Normal range of motion. No thyromegaly present.     Pulmonary/Chest: Right breast exhibits no mass, no nipple discharge, no skin change, no tenderness and no swelling. Left breast exhibits no mass, no nipple discharge, no skin change, no tenderness and no swelling. Breasts are symmetrical.        Abdominal: Soft. Normal appearance and bowel sounds are normal. She exhibits no distension. There is no abdominal tenderness.     Genitourinary:    Vagina and uterus normal.      Pelvic exam was performed with patient supine.   There is no rash, tenderness, lesion or injury on the right labia. There is no rash, tenderness, lesion or injury on the left labia. Cervix is normal. Right adnexum displays no mass, no tenderness and no fullness. Left adnexum displays no mass, no tenderness and no fullness. No erythema in the vagina. " Cervix exhibits no motion tenderness and no discharge. negative for vaginal discharge          Musculoskeletal: Normal range of motion.      Lymphadenopathy:        Right: No supraclavicular adenopathy present.        Left: No supraclavicular adenopathy present.    Neurological: She is alert and oriented to person, place, and time.    Skin: Skin is warm and dry.    Psychiatric: She has a normal mood and affect.        Assessment/ Plan:     Encounter for gynecological examination    Menopause  -     progesterone (PROMETRIUM) 200 MG capsule; Take 1 capsule by mouth 30-60 minutes before bed every night  Dispense: 90 capsule; Refill: 3  -     estradioL (ESTRACE) 2 MG tablet; Take 1 tablet (2 mg total) by mouth once daily.  Dispense: 90 tablet; Refill: 3    Dyspareunia in female  -     prasterone, dhea, (INTRAROSA) 6.5 mg Inst; Place 6.5 mg vaginally every evening.  Dispense: 28 each; Refill: 11    Postmenopausal bleeding  -     US Pelvis Comp with Transvag NON-OB (xpd; Future; Expected date: 12/29/2020        Routine pap smears.  Self breast exam and mammography discussed  Routine colonoscopy discussed.  Diet and exercise discussed.  Recommend calcium 1200 mg and vitamin D 600 units daily and routine bone mineral density testing.  Yearly influenza vaccination discussed.  Follow-up with me for U/S.

## 2020-12-29 ENCOUNTER — OFFICE VISIT (OUTPATIENT)
Dept: OBSTETRICS AND GYNECOLOGY | Facility: CLINIC | Age: 60
End: 2020-12-29
Attending: OBSTETRICS & GYNECOLOGY
Payer: COMMERCIAL

## 2020-12-29 VITALS
TEMPERATURE: 98 F | WEIGHT: 231.5 LBS | BODY MASS INDEX: 35.09 KG/M2 | DIASTOLIC BLOOD PRESSURE: 84 MMHG | HEIGHT: 68 IN | SYSTOLIC BLOOD PRESSURE: 130 MMHG

## 2020-12-29 DIAGNOSIS — N95.0 POSTMENOPAUSAL BLEEDING: ICD-10-CM

## 2020-12-29 DIAGNOSIS — Z01.419 ENCOUNTER FOR GYNECOLOGICAL EXAMINATION: Primary | ICD-10-CM

## 2020-12-29 DIAGNOSIS — N94.10 DYSPAREUNIA IN FEMALE: ICD-10-CM

## 2020-12-29 DIAGNOSIS — Z78.0 MENOPAUSE: ICD-10-CM

## 2020-12-29 PROCEDURE — 99396 PREV VISIT EST AGE 40-64: CPT | Mod: S$GLB,,, | Performed by: OBSTETRICS & GYNECOLOGY

## 2020-12-29 PROCEDURE — 1126F AMNT PAIN NOTED NONE PRSNT: CPT | Mod: S$GLB,,, | Performed by: OBSTETRICS & GYNECOLOGY

## 2020-12-29 PROCEDURE — 99999 PR PBB SHADOW E&M-EST. PATIENT-LVL III: ICD-10-PCS | Mod: PBBFAC,,, | Performed by: OBSTETRICS & GYNECOLOGY

## 2020-12-29 PROCEDURE — 1126F PR PAIN SEVERITY QUANTIFIED, NO PAIN PRESENT: ICD-10-PCS | Mod: S$GLB,,, | Performed by: OBSTETRICS & GYNECOLOGY

## 2020-12-29 PROCEDURE — 3008F PR BODY MASS INDEX (BMI) DOCUMENTED: ICD-10-PCS | Mod: CPTII,S$GLB,, | Performed by: OBSTETRICS & GYNECOLOGY

## 2020-12-29 PROCEDURE — 99999 PR PBB SHADOW E&M-EST. PATIENT-LVL III: CPT | Mod: PBBFAC,,, | Performed by: OBSTETRICS & GYNECOLOGY

## 2020-12-29 PROCEDURE — 99396 PR PREVENTIVE VISIT,EST,40-64: ICD-10-PCS | Mod: S$GLB,,, | Performed by: OBSTETRICS & GYNECOLOGY

## 2020-12-29 PROCEDURE — 3008F BODY MASS INDEX DOCD: CPT | Mod: CPTII,S$GLB,, | Performed by: OBSTETRICS & GYNECOLOGY

## 2020-12-29 RX ORDER — PROGESTERONE 200 MG/1
CAPSULE ORAL
Qty: 90 CAPSULE | Refills: 3 | Status: SHIPPED | OUTPATIENT
Start: 2020-12-29 | End: 2022-01-25 | Stop reason: SDUPTHER

## 2020-12-29 RX ORDER — PRASTERONE 6.5 MG/1
6.5 INSERT VAGINAL NIGHTLY
Qty: 28 EACH | Refills: 11 | Status: SHIPPED | OUTPATIENT
Start: 2020-12-29 | End: 2022-01-25 | Stop reason: SDUPTHER

## 2020-12-29 RX ORDER — ESTRADIOL 2 MG/1
2 TABLET ORAL DAILY
Qty: 90 TABLET | Refills: 3 | Status: SHIPPED | OUTPATIENT
Start: 2020-12-29 | End: 2022-01-25 | Stop reason: SDUPTHER

## 2021-01-05 ENCOUNTER — PATIENT MESSAGE (OUTPATIENT)
Dept: ADMINISTRATIVE | Facility: HOSPITAL | Age: 61
End: 2021-01-05

## 2021-01-11 ENCOUNTER — IMMUNIZATION (OUTPATIENT)
Dept: INTERNAL MEDICINE | Facility: CLINIC | Age: 61
End: 2021-01-11
Payer: COMMERCIAL

## 2021-01-11 DIAGNOSIS — Z23 NEED FOR VACCINATION: ICD-10-CM

## 2021-01-11 PROCEDURE — 91300 COVID-19, MRNA, LNP-S, PF, 30 MCG/0.3 ML DOSE VACCINE: CPT | Mod: PBBFAC | Performed by: FAMILY MEDICINE

## 2021-01-11 PROCEDURE — 0002A COVID-19, MRNA, LNP-S, PF, 30 MCG/0.3 ML DOSE VACCINE: CPT | Mod: PBBFAC | Performed by: FAMILY MEDICINE

## 2021-01-25 ENCOUNTER — OFFICE VISIT (OUTPATIENT)
Dept: INTERNAL MEDICINE | Facility: CLINIC | Age: 61
End: 2021-01-25
Payer: COMMERCIAL

## 2021-01-25 VITALS
OXYGEN SATURATION: 95 % | BODY MASS INDEX: 35.05 KG/M2 | WEIGHT: 231.25 LBS | RESPIRATION RATE: 16 BRPM | DIASTOLIC BLOOD PRESSURE: 84 MMHG | HEART RATE: 70 BPM | SYSTOLIC BLOOD PRESSURE: 124 MMHG | HEIGHT: 68 IN | TEMPERATURE: 97 F

## 2021-01-25 DIAGNOSIS — Z86.010 HISTORY OF ADENOMATOUS POLYP OF COLON: ICD-10-CM

## 2021-01-25 DIAGNOSIS — Z00.00 PREVENTATIVE HEALTH CARE: Primary | ICD-10-CM

## 2021-01-25 DIAGNOSIS — N93.8 DUB (DYSFUNCTIONAL UTERINE BLEEDING): ICD-10-CM

## 2021-01-25 PROCEDURE — 1126F AMNT PAIN NOTED NONE PRSNT: CPT | Mod: S$GLB,,, | Performed by: INTERNAL MEDICINE

## 2021-01-25 PROCEDURE — 99999 PR PBB SHADOW E&M-EST. PATIENT-LVL IV: CPT | Mod: PBBFAC,,, | Performed by: INTERNAL MEDICINE

## 2021-01-25 PROCEDURE — 99396 PR PREVENTIVE VISIT,EST,40-64: ICD-10-PCS | Mod: S$GLB,,, | Performed by: INTERNAL MEDICINE

## 2021-01-25 PROCEDURE — 99396 PREV VISIT EST AGE 40-64: CPT | Mod: S$GLB,,, | Performed by: INTERNAL MEDICINE

## 2021-01-25 PROCEDURE — 3008F BODY MASS INDEX DOCD: CPT | Mod: CPTII,S$GLB,, | Performed by: INTERNAL MEDICINE

## 2021-01-25 PROCEDURE — 99999 PR PBB SHADOW E&M-EST. PATIENT-LVL IV: ICD-10-PCS | Mod: PBBFAC,,, | Performed by: INTERNAL MEDICINE

## 2021-01-25 PROCEDURE — 1126F PR PAIN SEVERITY QUANTIFIED, NO PAIN PRESENT: ICD-10-PCS | Mod: S$GLB,,, | Performed by: INTERNAL MEDICINE

## 2021-01-25 PROCEDURE — 3008F PR BODY MASS INDEX (BMI) DOCUMENTED: ICD-10-PCS | Mod: CPTII,S$GLB,, | Performed by: INTERNAL MEDICINE

## 2021-01-26 ENCOUNTER — LAB VISIT (OUTPATIENT)
Dept: LAB | Facility: HOSPITAL | Age: 61
End: 2021-01-26
Attending: INTERNAL MEDICINE
Payer: COMMERCIAL

## 2021-01-26 DIAGNOSIS — Z00.00 PREVENTATIVE HEALTH CARE: ICD-10-CM

## 2021-01-26 LAB
ALBUMIN SERPL BCP-MCNC: 3.6 G/DL (ref 3.5–5.2)
ALP SERPL-CCNC: 66 U/L (ref 55–135)
ALT SERPL W/O P-5'-P-CCNC: 15 U/L (ref 10–44)
ANION GAP SERPL CALC-SCNC: 8 MMOL/L (ref 8–16)
AST SERPL-CCNC: 18 U/L (ref 10–40)
BASOPHILS # BLD AUTO: 0.03 K/UL (ref 0–0.2)
BASOPHILS NFR BLD: 0.4 % (ref 0–1.9)
BILIRUB SERPL-MCNC: 0.6 MG/DL (ref 0.1–1)
BUN SERPL-MCNC: 12 MG/DL (ref 6–20)
CALCIUM SERPL-MCNC: 9 MG/DL (ref 8.7–10.5)
CHLORIDE SERPL-SCNC: 107 MMOL/L (ref 95–110)
CHOLEST SERPL-MCNC: 164 MG/DL (ref 120–199)
CHOLEST/HDLC SERPL: 3.5 {RATIO} (ref 2–5)
CO2 SERPL-SCNC: 24 MMOL/L (ref 23–29)
CREAT SERPL-MCNC: 0.9 MG/DL (ref 0.5–1.4)
DIFFERENTIAL METHOD: ABNORMAL
EOSINOPHIL # BLD AUTO: 0.2 K/UL (ref 0–0.5)
EOSINOPHIL NFR BLD: 2.7 % (ref 0–8)
ERYTHROCYTE [DISTWIDTH] IN BLOOD BY AUTOMATED COUNT: 11.7 % (ref 11.5–14.5)
EST. GFR  (AFRICAN AMERICAN): >60 ML/MIN/1.73 M^2
EST. GFR  (NON AFRICAN AMERICAN): >60 ML/MIN/1.73 M^2
GLUCOSE SERPL-MCNC: 95 MG/DL (ref 70–110)
HCT VFR BLD AUTO: 43.3 % (ref 37–48.5)
HDLC SERPL-MCNC: 47 MG/DL (ref 40–75)
HDLC SERPL: 28.7 % (ref 20–50)
HGB BLD-MCNC: 14.2 G/DL (ref 12–16)
IMM GRANULOCYTES # BLD AUTO: 0.02 K/UL (ref 0–0.04)
IMM GRANULOCYTES NFR BLD AUTO: 0.3 % (ref 0–0.5)
LDLC SERPL CALC-MCNC: 87.4 MG/DL (ref 63–159)
LYMPHOCYTES # BLD AUTO: 1 K/UL (ref 1–4.8)
LYMPHOCYTES NFR BLD: 14.3 % (ref 18–48)
MCH RBC QN AUTO: 31.4 PG (ref 27–31)
MCHC RBC AUTO-ENTMCNC: 32.8 G/DL (ref 32–36)
MCV RBC AUTO: 96 FL (ref 82–98)
MONOCYTES # BLD AUTO: 0.5 K/UL (ref 0.3–1)
MONOCYTES NFR BLD: 7 % (ref 4–15)
NEUTROPHILS # BLD AUTO: 5.5 K/UL (ref 1.8–7.7)
NEUTROPHILS NFR BLD: 75.3 % (ref 38–73)
NONHDLC SERPL-MCNC: 117 MG/DL
NRBC BLD-RTO: 0 /100 WBC
PLATELET # BLD AUTO: 284 K/UL (ref 150–350)
PMV BLD AUTO: 10.7 FL (ref 9.2–12.9)
POTASSIUM SERPL-SCNC: 4.6 MMOL/L (ref 3.5–5.1)
PROT SERPL-MCNC: 6.9 G/DL (ref 6–8.4)
RBC # BLD AUTO: 4.52 M/UL (ref 4–5.4)
SODIUM SERPL-SCNC: 139 MMOL/L (ref 136–145)
TRIGL SERPL-MCNC: 148 MG/DL (ref 30–150)
TSH SERPL DL<=0.005 MIU/L-ACNC: 1.14 UIU/ML (ref 0.4–4)
WBC # BLD AUTO: 7.28 K/UL (ref 3.9–12.7)

## 2021-01-26 PROCEDURE — 85025 COMPLETE CBC W/AUTO DIFF WBC: CPT

## 2021-01-26 PROCEDURE — 36415 COLL VENOUS BLD VENIPUNCTURE: CPT

## 2021-01-26 PROCEDURE — 84443 ASSAY THYROID STIM HORMONE: CPT

## 2021-01-26 PROCEDURE — 80061 LIPID PANEL: CPT

## 2021-01-26 PROCEDURE — 80053 COMPREHEN METABOLIC PANEL: CPT

## 2021-01-27 ENCOUNTER — PATIENT MESSAGE (OUTPATIENT)
Dept: OBSTETRICS AND GYNECOLOGY | Facility: CLINIC | Age: 61
End: 2021-01-27

## 2021-02-23 ENCOUNTER — TELEPHONE (OUTPATIENT)
Dept: OBSTETRICS AND GYNECOLOGY | Facility: CLINIC | Age: 61
End: 2021-02-23

## 2021-02-23 ENCOUNTER — OFFICE VISIT (OUTPATIENT)
Dept: OBSTETRICS AND GYNECOLOGY | Facility: CLINIC | Age: 61
End: 2021-02-23
Attending: OBSTETRICS & GYNECOLOGY
Payer: COMMERCIAL

## 2021-02-23 VITALS
DIASTOLIC BLOOD PRESSURE: 90 MMHG | BODY MASS INDEX: 34.26 KG/M2 | WEIGHT: 225.31 LBS | SYSTOLIC BLOOD PRESSURE: 128 MMHG

## 2021-02-23 DIAGNOSIS — N84.1 ENDOCERVICAL POLYP: Primary | ICD-10-CM

## 2021-02-23 DIAGNOSIS — N95.0 POSTMENOPAUSAL BLEEDING: ICD-10-CM

## 2021-02-23 DIAGNOSIS — R93.89 THICKENED ENDOMETRIUM: ICD-10-CM

## 2021-02-23 DIAGNOSIS — N64.4 MASTODYNIA OF RIGHT BREAST: ICD-10-CM

## 2021-02-23 DIAGNOSIS — Z01.818 PRE-PROCEDURAL EXAMINATION: Primary | ICD-10-CM

## 2021-02-23 DIAGNOSIS — N95.0 PMB (POSTMENOPAUSAL BLEEDING): ICD-10-CM

## 2021-02-23 PROCEDURE — 3008F BODY MASS INDEX DOCD: CPT | Mod: CPTII,S$GLB,, | Performed by: OBSTETRICS & GYNECOLOGY

## 2021-02-23 PROCEDURE — 1126F PR PAIN SEVERITY QUANTIFIED, NO PAIN PRESENT: ICD-10-PCS | Mod: S$GLB,,, | Performed by: OBSTETRICS & GYNECOLOGY

## 2021-02-23 PROCEDURE — 3008F PR BODY MASS INDEX (BMI) DOCUMENTED: ICD-10-PCS | Mod: CPTII,S$GLB,, | Performed by: OBSTETRICS & GYNECOLOGY

## 2021-02-23 PROCEDURE — 99999 PR PBB SHADOW E&M-EST. PATIENT-LVL III: ICD-10-PCS | Mod: PBBFAC,,, | Performed by: OBSTETRICS & GYNECOLOGY

## 2021-02-23 PROCEDURE — 99999 PR PBB SHADOW E&M-EST. PATIENT-LVL III: CPT | Mod: PBBFAC,,, | Performed by: OBSTETRICS & GYNECOLOGY

## 2021-02-23 PROCEDURE — 1126F AMNT PAIN NOTED NONE PRSNT: CPT | Mod: S$GLB,,, | Performed by: OBSTETRICS & GYNECOLOGY

## 2021-02-23 PROCEDURE — 99214 OFFICE O/P EST MOD 30 MIN: CPT | Mod: S$GLB,,, | Performed by: OBSTETRICS & GYNECOLOGY

## 2021-02-23 PROCEDURE — 99214 PR OFFICE/OUTPT VISIT, EST, LEVL IV, 30-39 MIN: ICD-10-PCS | Mod: S$GLB,,, | Performed by: OBSTETRICS & GYNECOLOGY

## 2021-02-26 ENCOUNTER — HOSPITAL ENCOUNTER (OUTPATIENT)
Dept: RADIOLOGY | Facility: HOSPITAL | Age: 61
Discharge: HOME | End: 2021-02-26
Attending: OBSTETRICS & GYNECOLOGY
Payer: COMMERCIAL

## 2021-02-26 DIAGNOSIS — N64.4 MASTODYNIA OF RIGHT BREAST: ICD-10-CM

## 2021-02-26 PROCEDURE — 76642 ULTRASOUND BREAST LIMITED: CPT | Mod: TC,RT

## 2021-02-26 PROCEDURE — 76642 ULTRASOUND BREAST LIMITED: CPT | Mod: 26,RT,, | Performed by: RADIOLOGY

## 2021-02-26 PROCEDURE — G0279 MAMMO DIGITAL DIAGNOSTIC RIGHT WITH TOMO: ICD-10-PCS | Mod: 26,,, | Performed by: RADIOLOGY

## 2021-02-26 PROCEDURE — 77061 BREAST TOMOSYNTHESIS UNI: CPT | Mod: TC,RT

## 2021-02-26 PROCEDURE — 77065 DX MAMMO INCL CAD UNI: CPT | Mod: 26,RT,, | Performed by: RADIOLOGY

## 2021-02-26 PROCEDURE — 76642 US BREAST RIGHT LIMITED: ICD-10-PCS | Mod: 26,RT,, | Performed by: RADIOLOGY

## 2021-02-26 PROCEDURE — G0279 TOMOSYNTHESIS, MAMMO: HCPCS | Mod: 26,,, | Performed by: RADIOLOGY

## 2021-02-26 PROCEDURE — 77065 MAMMO DIGITAL DIAGNOSTIC RIGHT WITH TOMO: ICD-10-PCS | Mod: 26,RT,, | Performed by: RADIOLOGY

## 2021-03-01 ENCOUNTER — PATIENT MESSAGE (OUTPATIENT)
Dept: OPTOMETRY | Facility: CLINIC | Age: 61
End: 2021-03-01

## 2021-03-01 DIAGNOSIS — H04.123 BILATERAL DRY EYES: Primary | ICD-10-CM

## 2021-03-01 RX ORDER — CYCLOSPORINE 0.5 MG/ML
EMULSION OPHTHALMIC
Qty: 180 VIAL | Refills: 3 | Status: SHIPPED | OUTPATIENT
Start: 2021-03-01 | End: 2022-04-23 | Stop reason: SDUPTHER

## 2021-03-29 ENCOUNTER — HOSPITAL ENCOUNTER (OUTPATIENT)
Dept: PREADMISSION TESTING | Facility: OTHER | Age: 61
Discharge: HOME | End: 2021-03-29
Attending: OBSTETRICS & GYNECOLOGY
Payer: COMMERCIAL

## 2021-03-29 ENCOUNTER — OFFICE VISIT (OUTPATIENT)
Dept: OBSTETRICS AND GYNECOLOGY | Facility: CLINIC | Age: 61
End: 2021-03-29
Attending: OBSTETRICS & GYNECOLOGY
Payer: COMMERCIAL

## 2021-03-29 ENCOUNTER — ANESTHESIA EVENT (OUTPATIENT)
Dept: SURGERY | Facility: OTHER | Age: 61
End: 2021-03-29
Payer: COMMERCIAL

## 2021-03-29 VITALS
HEIGHT: 68 IN | WEIGHT: 220 LBS | DIASTOLIC BLOOD PRESSURE: 66 MMHG | HEART RATE: 75 BPM | BODY MASS INDEX: 33.34 KG/M2 | OXYGEN SATURATION: 96 % | SYSTOLIC BLOOD PRESSURE: 141 MMHG | TEMPERATURE: 98 F

## 2021-03-29 VITALS
WEIGHT: 226.75 LBS | DIASTOLIC BLOOD PRESSURE: 82 MMHG | BODY MASS INDEX: 34.36 KG/M2 | SYSTOLIC BLOOD PRESSURE: 124 MMHG | HEIGHT: 68 IN

## 2021-03-29 DIAGNOSIS — R93.89 THICKENED ENDOMETRIUM: ICD-10-CM

## 2021-03-29 DIAGNOSIS — N95.0 PMB (POSTMENOPAUSAL BLEEDING): Primary | ICD-10-CM

## 2021-03-29 DIAGNOSIS — N95.0 POSTMENOPAUSAL BLEEDING: ICD-10-CM

## 2021-03-29 PROCEDURE — 99211 PR OFFICE/OUTPT VISIT, EST, LEVL I: ICD-10-PCS | Mod: S$GLB,,, | Performed by: OBSTETRICS & GYNECOLOGY

## 2021-03-29 PROCEDURE — 3008F BODY MASS INDEX DOCD: CPT | Mod: CPTII,S$GLB,, | Performed by: OBSTETRICS & GYNECOLOGY

## 2021-03-29 PROCEDURE — 99999 PR PBB SHADOW E&M-EST. PATIENT-LVL III: CPT | Mod: PBBFAC,,, | Performed by: OBSTETRICS & GYNECOLOGY

## 2021-03-29 PROCEDURE — 3008F PR BODY MASS INDEX (BMI) DOCUMENTED: ICD-10-PCS | Mod: CPTII,S$GLB,, | Performed by: OBSTETRICS & GYNECOLOGY

## 2021-03-29 PROCEDURE — 1126F AMNT PAIN NOTED NONE PRSNT: CPT | Mod: S$GLB,,, | Performed by: OBSTETRICS & GYNECOLOGY

## 2021-03-29 PROCEDURE — 99211 OFF/OP EST MAY X REQ PHY/QHP: CPT | Mod: S$GLB,,, | Performed by: OBSTETRICS & GYNECOLOGY

## 2021-03-29 PROCEDURE — 1126F PR PAIN SEVERITY QUANTIFIED, NO PAIN PRESENT: ICD-10-PCS | Mod: S$GLB,,, | Performed by: OBSTETRICS & GYNECOLOGY

## 2021-03-29 PROCEDURE — 99999 PR PBB SHADOW E&M-EST. PATIENT-LVL III: ICD-10-PCS | Mod: PBBFAC,,, | Performed by: OBSTETRICS & GYNECOLOGY

## 2021-03-29 RX ORDER — ACETAMINOPHEN 500 MG
1000 TABLET ORAL
Status: CANCELLED | OUTPATIENT
Start: 2021-03-29 | End: 2021-03-29

## 2021-03-29 RX ORDER — SODIUM CHLORIDE, SODIUM LACTATE, POTASSIUM CHLORIDE, CALCIUM CHLORIDE 600; 310; 30; 20 MG/100ML; MG/100ML; MG/100ML; MG/100ML
INJECTION, SOLUTION INTRAVENOUS CONTINUOUS
Status: CANCELLED | OUTPATIENT
Start: 2021-03-29

## 2021-03-29 RX ORDER — FAMOTIDINE 20 MG/1
20 TABLET, FILM COATED ORAL
Status: CANCELLED | OUTPATIENT
Start: 2021-03-29 | End: 2021-03-29

## 2021-03-29 RX ORDER — SODIUM CHLORIDE 9 MG/ML
INJECTION, SOLUTION INTRAVENOUS CONTINUOUS
Status: CANCELLED | OUTPATIENT
Start: 2021-03-29

## 2021-03-29 RX ORDER — SCOLOPAMINE TRANSDERMAL SYSTEM 1 MG/1
1 PATCH, EXTENDED RELEASE TRANSDERMAL
Status: CANCELLED | OUTPATIENT
Start: 2021-03-29

## 2021-04-03 ENCOUNTER — LAB VISIT (OUTPATIENT)
Dept: INTERNAL MEDICINE | Facility: CLINIC | Age: 61
End: 2021-04-03
Payer: COMMERCIAL

## 2021-04-03 DIAGNOSIS — Z01.818 PRE-PROCEDURAL EXAMINATION: ICD-10-CM

## 2021-04-03 PROCEDURE — U0003 INFECTIOUS AGENT DETECTION BY NUCLEIC ACID (DNA OR RNA); SEVERE ACUTE RESPIRATORY SYNDROME CORONAVIRUS 2 (SARS-COV-2) (CORONAVIRUS DISEASE [COVID-19]), AMPLIFIED PROBE TECHNIQUE, MAKING USE OF HIGH THROUGHPUT TECHNOLOGIES AS DESCRIBED BY CMS-2020-01-R: HCPCS | Performed by: OBSTETRICS & GYNECOLOGY

## 2021-04-03 PROCEDURE — U0005 INFEC AGEN DETEC AMPLI PROBE: HCPCS | Performed by: OBSTETRICS & GYNECOLOGY

## 2021-04-04 LAB — SARS-COV-2 RNA RESP QL NAA+PROBE: NOT DETECTED

## 2021-04-06 ENCOUNTER — ANESTHESIA (OUTPATIENT)
Dept: SURGERY | Facility: OTHER | Age: 61
End: 2021-04-06
Payer: COMMERCIAL

## 2021-04-06 ENCOUNTER — HOSPITAL ENCOUNTER (OUTPATIENT)
Facility: OTHER | Age: 61
Discharge: HOME | End: 2021-04-06
Attending: OBSTETRICS & GYNECOLOGY | Admitting: OBSTETRICS & GYNECOLOGY
Payer: COMMERCIAL

## 2021-04-06 VITALS
HEART RATE: 67 BPM | DIASTOLIC BLOOD PRESSURE: 59 MMHG | RESPIRATION RATE: 16 BRPM | TEMPERATURE: 98 F | OXYGEN SATURATION: 100 % | SYSTOLIC BLOOD PRESSURE: 125 MMHG | BODY MASS INDEX: 34.36 KG/M2 | WEIGHT: 226 LBS

## 2021-04-06 DIAGNOSIS — N95.0 POSTMENOPAUSAL BLEEDING: ICD-10-CM

## 2021-04-06 DIAGNOSIS — N95.0 PMB (POSTMENOPAUSAL BLEEDING): ICD-10-CM

## 2021-04-06 DIAGNOSIS — R93.89 THICKENED ENDOMETRIUM: ICD-10-CM

## 2021-04-06 DIAGNOSIS — N84.1 ENDOCERVICAL POLYP: Primary | ICD-10-CM

## 2021-04-06 LAB
ANION GAP SERPL CALC-SCNC: 9 MMOL/L (ref 8–16)
BASOPHILS # BLD AUTO: 0.04 K/UL (ref 0–0.2)
BASOPHILS NFR BLD: 0.5 % (ref 0–1.9)
BUN SERPL-MCNC: 12 MG/DL (ref 6–20)
CALCIUM SERPL-MCNC: 9.2 MG/DL (ref 8.7–10.5)
CHLORIDE SERPL-SCNC: 107 MMOL/L (ref 95–110)
CO2 SERPL-SCNC: 24 MMOL/L (ref 23–29)
CREAT SERPL-MCNC: 0.8 MG/DL (ref 0.5–1.4)
DIFFERENTIAL METHOD: ABNORMAL
EOSINOPHIL # BLD AUTO: 0.2 K/UL (ref 0–0.5)
EOSINOPHIL NFR BLD: 2.1 % (ref 0–8)
ERYTHROCYTE [DISTWIDTH] IN BLOOD BY AUTOMATED COUNT: 11.9 % (ref 11.5–14.5)
EST. GFR  (AFRICAN AMERICAN): >60 ML/MIN/1.73 M^2
EST. GFR  (NON AFRICAN AMERICAN): >60 ML/MIN/1.73 M^2
GLUCOSE SERPL-MCNC: 90 MG/DL (ref 70–110)
HCT VFR BLD AUTO: 43.7 % (ref 37–48.5)
HGB BLD-MCNC: 14.2 G/DL (ref 12–16)
IMM GRANULOCYTES # BLD AUTO: 0.02 K/UL (ref 0–0.04)
IMM GRANULOCYTES NFR BLD AUTO: 0.2 % (ref 0–0.5)
LYMPHOCYTES # BLD AUTO: 1.4 K/UL (ref 1–4.8)
LYMPHOCYTES NFR BLD: 16.8 % (ref 18–48)
MCH RBC QN AUTO: 30.7 PG (ref 27–31)
MCHC RBC AUTO-ENTMCNC: 32.5 G/DL (ref 32–36)
MCV RBC AUTO: 95 FL (ref 82–98)
MONOCYTES # BLD AUTO: 0.6 K/UL (ref 0.3–1)
MONOCYTES NFR BLD: 7 % (ref 4–15)
NEUTROPHILS # BLD AUTO: 6.2 K/UL (ref 1.8–7.7)
NEUTROPHILS NFR BLD: 73.4 % (ref 38–73)
NRBC BLD-RTO: 0 /100 WBC
PLATELET # BLD AUTO: 291 K/UL (ref 150–450)
PMV BLD AUTO: 9.9 FL (ref 9.2–12.9)
POTASSIUM SERPL-SCNC: 4.4 MMOL/L (ref 3.5–5.1)
RBC # BLD AUTO: 4.62 M/UL (ref 4–5.4)
SODIUM SERPL-SCNC: 140 MMOL/L (ref 136–145)
WBC # BLD AUTO: 8.47 K/UL (ref 3.9–12.7)

## 2021-04-06 PROCEDURE — 63600175 PHARM REV CODE 636 W HCPCS: Performed by: NURSE ANESTHETIST, CERTIFIED REGISTERED

## 2021-04-06 PROCEDURE — 36415 COLL VENOUS BLD VENIPUNCTURE: CPT | Performed by: OBSTETRICS & GYNECOLOGY

## 2021-04-06 PROCEDURE — 71000039 HC RECOVERY, EACH ADD'L HOUR: Performed by: OBSTETRICS & GYNECOLOGY

## 2021-04-06 PROCEDURE — 88305 TISSUE EXAM BY PATHOLOGIST: CPT | Performed by: PATHOLOGY

## 2021-04-06 PROCEDURE — 58558 HYSTEROSCOPY BIOPSY: CPT | Mod: ,,, | Performed by: OBSTETRICS & GYNECOLOGY

## 2021-04-06 PROCEDURE — 85025 COMPLETE CBC W/AUTO DIFF WBC: CPT | Performed by: OBSTETRICS & GYNECOLOGY

## 2021-04-06 PROCEDURE — 25000003 PHARM REV CODE 250: Performed by: ANESTHESIOLOGY

## 2021-04-06 PROCEDURE — 83036 HEMOGLOBIN GLYCOSYLATED A1C: CPT | Performed by: OBSTETRICS & GYNECOLOGY

## 2021-04-06 PROCEDURE — 71000016 HC POSTOP RECOV ADDL HR: Performed by: OBSTETRICS & GYNECOLOGY

## 2021-04-06 PROCEDURE — 37000009 HC ANESTHESIA EA ADD 15 MINS: Performed by: OBSTETRICS & GYNECOLOGY

## 2021-04-06 PROCEDURE — 63600175 PHARM REV CODE 636 W HCPCS: Performed by: ANESTHESIOLOGY

## 2021-04-06 PROCEDURE — 36000706: Performed by: OBSTETRICS & GYNECOLOGY

## 2021-04-06 PROCEDURE — 58558 PR HYSTEROSCOPY,W/ENDO BX: ICD-10-PCS | Mod: ,,, | Performed by: OBSTETRICS & GYNECOLOGY

## 2021-04-06 PROCEDURE — 36000707: Performed by: OBSTETRICS & GYNECOLOGY

## 2021-04-06 PROCEDURE — 71000033 HC RECOVERY, INTIAL HOUR: Performed by: OBSTETRICS & GYNECOLOGY

## 2021-04-06 PROCEDURE — 88305 TISSUE EXAM BY PATHOLOGIST: CPT | Mod: 26,,, | Performed by: PATHOLOGY

## 2021-04-06 PROCEDURE — 25000003 PHARM REV CODE 250: Performed by: NURSE ANESTHETIST, CERTIFIED REGISTERED

## 2021-04-06 PROCEDURE — 37000008 HC ANESTHESIA 1ST 15 MINUTES: Performed by: OBSTETRICS & GYNECOLOGY

## 2021-04-06 PROCEDURE — 71000015 HC POSTOP RECOV 1ST HR: Performed by: OBSTETRICS & GYNECOLOGY

## 2021-04-06 PROCEDURE — 80048 BASIC METABOLIC PNL TOTAL CA: CPT | Performed by: OBSTETRICS & GYNECOLOGY

## 2021-04-06 PROCEDURE — 27201423 OPTIME MED/SURG SUP & DEVICES STERILE SUPPLY: Performed by: OBSTETRICS & GYNECOLOGY

## 2021-04-06 PROCEDURE — 88305 TISSUE EXAM BY PATHOLOGIST: ICD-10-PCS | Mod: 26,,, | Performed by: PATHOLOGY

## 2021-04-06 RX ORDER — IBUPROFEN 800 MG/1
800 TABLET ORAL EVERY 8 HOURS PRN
Qty: 30 TABLET | Refills: 0 | Status: SHIPPED | OUTPATIENT
Start: 2021-04-06 | End: 2021-07-19

## 2021-04-06 RX ORDER — PROCHLORPERAZINE EDISYLATE 5 MG/ML
5 INJECTION INTRAMUSCULAR; INTRAVENOUS EVERY 6 HOURS PRN
Status: DISCONTINUED | OUTPATIENT
Start: 2021-04-06 | End: 2021-04-06 | Stop reason: HOSPADM

## 2021-04-06 RX ORDER — LIDOCAINE HYDROCHLORIDE 20 MG/ML
INJECTION INTRAVENOUS
Status: DISCONTINUED | OUTPATIENT
Start: 2021-04-06 | End: 2021-04-06

## 2021-04-06 RX ORDER — SODIUM CHLORIDE, SODIUM LACTATE, POTASSIUM CHLORIDE, CALCIUM CHLORIDE 600; 310; 30; 20 MG/100ML; MG/100ML; MG/100ML; MG/100ML
INJECTION, SOLUTION INTRAVENOUS CONTINUOUS
Status: DISCONTINUED | OUTPATIENT
Start: 2021-04-06 | End: 2021-04-06 | Stop reason: HOSPADM

## 2021-04-06 RX ORDER — SODIUM CHLORIDE 0.9 % (FLUSH) 0.9 %
3 SYRINGE (ML) INJECTION
Status: DISCONTINUED | OUTPATIENT
Start: 2021-04-06 | End: 2021-04-06 | Stop reason: HOSPADM

## 2021-04-06 RX ORDER — HYDROMORPHONE HYDROCHLORIDE 2 MG/ML
0.4 INJECTION, SOLUTION INTRAMUSCULAR; INTRAVENOUS; SUBCUTANEOUS EVERY 5 MIN PRN
Status: DISCONTINUED | OUTPATIENT
Start: 2021-04-06 | End: 2021-04-06 | Stop reason: HOSPADM

## 2021-04-06 RX ORDER — OXYCODONE AND ACETAMINOPHEN 5; 325 MG/1; MG/1
1 TABLET ORAL EVERY 4 HOURS PRN
Qty: 20 TABLET | Refills: 0 | Status: SHIPPED | OUTPATIENT
Start: 2021-04-06 | End: 2021-07-19

## 2021-04-06 RX ORDER — DIPHENHYDRAMINE HYDROCHLORIDE 50 MG/ML
25 INJECTION INTRAMUSCULAR; INTRAVENOUS EVERY 6 HOURS PRN
Status: DISCONTINUED | OUTPATIENT
Start: 2021-04-06 | End: 2021-04-06 | Stop reason: HOSPADM

## 2021-04-06 RX ORDER — DIPHENHYDRAMINE HCL 25 MG
25 CAPSULE ORAL EVERY 4 HOURS PRN
Status: CANCELLED | OUTPATIENT
Start: 2021-04-06

## 2021-04-06 RX ORDER — FAMOTIDINE 20 MG/1
20 TABLET, FILM COATED ORAL
Status: COMPLETED | OUTPATIENT
Start: 2021-04-06 | End: 2021-04-06

## 2021-04-06 RX ORDER — SODIUM CHLORIDE 9 MG/ML
INJECTION, SOLUTION INTRAVENOUS CONTINUOUS
Status: DISCONTINUED | OUTPATIENT
Start: 2021-04-06 | End: 2021-04-06 | Stop reason: HOSPADM

## 2021-04-06 RX ORDER — ACETAMINOPHEN 500 MG
1000 TABLET ORAL
Status: COMPLETED | OUTPATIENT
Start: 2021-04-06 | End: 2021-04-06

## 2021-04-06 RX ORDER — SCOLOPAMINE TRANSDERMAL SYSTEM 1 MG/1
1 PATCH, EXTENDED RELEASE TRANSDERMAL
Status: DISCONTINUED | OUTPATIENT
Start: 2021-04-06 | End: 2021-04-06 | Stop reason: HOSPADM

## 2021-04-06 RX ORDER — OXYCODONE HYDROCHLORIDE 5 MG/1
5 TABLET ORAL
Status: DISCONTINUED | OUTPATIENT
Start: 2021-04-06 | End: 2021-04-06 | Stop reason: HOSPADM

## 2021-04-06 RX ORDER — ONDANSETRON 2 MG/ML
INJECTION INTRAMUSCULAR; INTRAVENOUS
Status: DISCONTINUED | OUTPATIENT
Start: 2021-04-06 | End: 2021-04-06

## 2021-04-06 RX ORDER — PROPOFOL 10 MG/ML
VIAL (ML) INTRAVENOUS
Status: DISCONTINUED | OUTPATIENT
Start: 2021-04-06 | End: 2021-04-06

## 2021-04-06 RX ORDER — ONDANSETRON 8 MG/1
8 TABLET, ORALLY DISINTEGRATING ORAL EVERY 8 HOURS PRN
Status: DISCONTINUED | OUTPATIENT
Start: 2021-04-06 | End: 2021-04-06 | Stop reason: HOSPADM

## 2021-04-06 RX ORDER — FENTANYL CITRATE 50 UG/ML
INJECTION, SOLUTION INTRAMUSCULAR; INTRAVENOUS
Status: DISCONTINUED | OUTPATIENT
Start: 2021-04-06 | End: 2021-04-06

## 2021-04-06 RX ORDER — MEPERIDINE HYDROCHLORIDE 25 MG/ML
12.5 INJECTION INTRAMUSCULAR; INTRAVENOUS; SUBCUTANEOUS ONCE AS NEEDED
Status: DISCONTINUED | OUTPATIENT
Start: 2021-04-06 | End: 2021-04-06 | Stop reason: HOSPADM

## 2021-04-06 RX ORDER — DIPHENHYDRAMINE HYDROCHLORIDE 50 MG/ML
25 INJECTION INTRAMUSCULAR; INTRAVENOUS EVERY 4 HOURS PRN
Status: CANCELLED | OUTPATIENT
Start: 2021-04-06

## 2021-04-06 RX ORDER — EPHEDRINE SULFATE 50 MG/ML
INJECTION, SOLUTION INTRAVENOUS
Status: DISCONTINUED | OUTPATIENT
Start: 2021-04-06 | End: 2021-04-06

## 2021-04-06 RX ORDER — ONDANSETRON 2 MG/ML
4 INJECTION INTRAMUSCULAR; INTRAVENOUS DAILY PRN
Status: DISCONTINUED | OUTPATIENT
Start: 2021-04-06 | End: 2021-04-06 | Stop reason: HOSPADM

## 2021-04-06 RX ADMIN — EPHEDRINE SULFATE 10 MG: 50 INJECTION INTRAVENOUS at 02:04

## 2021-04-06 RX ADMIN — HYDROMORPHONE HYDROCHLORIDE 0.4 MG: 2 INJECTION INTRAMUSCULAR; INTRAVENOUS; SUBCUTANEOUS at 03:04

## 2021-04-06 RX ADMIN — ACETAMINOPHEN 1000 MG: 500 TABLET, FILM COATED ORAL at 12:04

## 2021-04-06 RX ADMIN — SODIUM CHLORIDE, SODIUM LACTATE, POTASSIUM CHLORIDE, AND CALCIUM CHLORIDE: 600; 310; 30; 20 INJECTION, SOLUTION INTRAVENOUS at 02:04

## 2021-04-06 RX ADMIN — CARBOXYMETHYLCELLULOSE SODIUM 2 DROP: 2.5 SOLUTION/ DROPS OPHTHALMIC at 02:04

## 2021-04-06 RX ADMIN — LIDOCAINE HYDROCHLORIDE 25 MG: 20 INJECTION, SOLUTION INTRAVENOUS at 02:04

## 2021-04-06 RX ADMIN — ONDANSETRON HYDROCHLORIDE 4 MG: 2 INJECTION INTRAMUSCULAR; INTRAVENOUS at 02:04

## 2021-04-06 RX ADMIN — SCOLOPAMINE TRANSDERMAL SYSTEM 1 PATCH: 1 PATCH, EXTENDED RELEASE TRANSDERMAL at 12:04

## 2021-04-06 RX ADMIN — EPHEDRINE SULFATE 5 MG: 50 INJECTION INTRAVENOUS at 02:04

## 2021-04-06 RX ADMIN — FAMOTIDINE 20 MG: 20 TABLET, FILM COATED ORAL at 12:04

## 2021-04-06 RX ADMIN — LIDOCAINE HYDROCHLORIDE 75 MG: 20 INJECTION, SOLUTION INTRAVENOUS at 02:04

## 2021-04-06 RX ADMIN — GLYCOPYRROLATE 0.2 MG: 0.2 INJECTION, SOLUTION INTRAMUSCULAR; INTRAVITREAL at 05:04

## 2021-04-06 RX ADMIN — FENTANYL CITRATE 100 MCG: 50 INJECTION, SOLUTION INTRAMUSCULAR; INTRAVENOUS at 02:04

## 2021-04-06 RX ADMIN — PROPOFOL 200 MG: 10 INJECTION, EMULSION INTRAVENOUS at 02:04

## 2021-04-07 LAB
ESTIMATED AVG GLUCOSE: 97 MG/DL (ref 68–131)
HBA1C MFR BLD: 5 % (ref 4–5.6)

## 2021-04-09 LAB
COMMENT: NORMAL
FINAL PATHOLOGIC DIAGNOSIS: NORMAL
GROSS: NORMAL
Lab: NORMAL

## 2021-04-14 ENCOUNTER — TELEPHONE (OUTPATIENT)
Dept: OBSTETRICS AND GYNECOLOGY | Facility: CLINIC | Age: 61
End: 2021-04-14

## 2021-04-15 ENCOUNTER — TELEPHONE (OUTPATIENT)
Dept: OBSTETRICS AND GYNECOLOGY | Facility: CLINIC | Age: 61
End: 2021-04-15

## 2021-04-15 DIAGNOSIS — Z01.818 PRE-PROCEDURAL EXAMINATION: Primary | ICD-10-CM

## 2021-04-15 DIAGNOSIS — N95.0 PMB (POSTMENOPAUSAL BLEEDING): Primary | ICD-10-CM

## 2021-04-20 ENCOUNTER — CLINICAL SUPPORT (OUTPATIENT)
Dept: OTHER | Facility: CLINIC | Age: 61
End: 2021-04-20
Payer: COMMERCIAL

## 2021-04-20 DIAGNOSIS — Z00.8 ENCOUNTER FOR OTHER GENERAL EXAMINATION: ICD-10-CM

## 2021-04-21 VITALS — HEIGHT: 68 IN | BODY MASS INDEX: 34.36 KG/M2

## 2021-04-21 LAB
GLUCOSE SERPL-MCNC: 98 MG/DL (ref 60–140)
HDLC SERPL-MCNC: 50 MG/DL
POC CHOLESTEROL, LDL (DOCK): 96 MG/DL
POC CHOLESTEROL, TOTAL: 182 MG/DL
TRIGL SERPL-MCNC: 182 MG/DL

## 2021-05-04 ENCOUNTER — OFFICE VISIT (OUTPATIENT)
Dept: OBSTETRICS AND GYNECOLOGY | Facility: CLINIC | Age: 61
End: 2021-05-04
Attending: OBSTETRICS & GYNECOLOGY
Payer: COMMERCIAL

## 2021-05-04 VITALS
HEIGHT: 68 IN | DIASTOLIC BLOOD PRESSURE: 72 MMHG | WEIGHT: 225.63 LBS | BODY MASS INDEX: 34.19 KG/M2 | SYSTOLIC BLOOD PRESSURE: 112 MMHG

## 2021-05-04 DIAGNOSIS — N95.0 PMB (POSTMENOPAUSAL BLEEDING): ICD-10-CM

## 2021-05-04 DIAGNOSIS — Z78.0 MENOPAUSE: ICD-10-CM

## 2021-05-04 DIAGNOSIS — Z09 POSTOP CHECK: Primary | ICD-10-CM

## 2021-05-04 PROCEDURE — 99999 PR PBB SHADOW E&M-EST. PATIENT-LVL III: CPT | Mod: PBBFAC,,, | Performed by: OBSTETRICS & GYNECOLOGY

## 2021-05-04 PROCEDURE — 99999 PR PBB SHADOW E&M-EST. PATIENT-LVL III: ICD-10-PCS | Mod: PBBFAC,,, | Performed by: OBSTETRICS & GYNECOLOGY

## 2021-05-04 PROCEDURE — 1126F PR PAIN SEVERITY QUANTIFIED, NO PAIN PRESENT: ICD-10-PCS | Mod: S$GLB,,, | Performed by: OBSTETRICS & GYNECOLOGY

## 2021-05-04 PROCEDURE — 3008F PR BODY MASS INDEX (BMI) DOCUMENTED: ICD-10-PCS | Mod: CPTII,S$GLB,, | Performed by: OBSTETRICS & GYNECOLOGY

## 2021-05-04 PROCEDURE — 3008F BODY MASS INDEX DOCD: CPT | Mod: CPTII,S$GLB,, | Performed by: OBSTETRICS & GYNECOLOGY

## 2021-05-04 PROCEDURE — 99213 PR OFFICE/OUTPT VISIT, EST, LEVL III, 20-29 MIN: ICD-10-PCS | Mod: S$GLB,,, | Performed by: OBSTETRICS & GYNECOLOGY

## 2021-05-04 PROCEDURE — 1126F AMNT PAIN NOTED NONE PRSNT: CPT | Mod: S$GLB,,, | Performed by: OBSTETRICS & GYNECOLOGY

## 2021-05-04 PROCEDURE — 99213 OFFICE O/P EST LOW 20 MIN: CPT | Mod: S$GLB,,, | Performed by: OBSTETRICS & GYNECOLOGY

## 2021-05-24 ENCOUNTER — PATIENT MESSAGE (OUTPATIENT)
Dept: OBSTETRICS AND GYNECOLOGY | Facility: CLINIC | Age: 61
End: 2021-05-24

## 2021-06-08 ENCOUNTER — PATIENT MESSAGE (OUTPATIENT)
Dept: OPTOMETRY | Facility: CLINIC | Age: 61
End: 2021-06-08

## 2021-06-08 DIAGNOSIS — H04.123 BILATERAL DRY EYES: ICD-10-CM

## 2021-06-08 RX ORDER — LOTEPREDNOL ETABONATE 5 MG/ML
1 SUSPENSION/ DROPS OPHTHALMIC 4 TIMES DAILY
Qty: 5 ML | Refills: 0 | Status: SHIPPED | OUTPATIENT
Start: 2021-06-08 | End: 2021-06-18

## 2021-07-12 ENCOUNTER — PATIENT MESSAGE (OUTPATIENT)
Dept: OTOLARYNGOLOGY | Facility: CLINIC | Age: 61
End: 2021-07-12

## 2021-07-13 ENCOUNTER — OFFICE VISIT (OUTPATIENT)
Dept: OTOLARYNGOLOGY | Facility: CLINIC | Age: 61
End: 2021-07-13
Payer: COMMERCIAL

## 2021-07-13 ENCOUNTER — TELEPHONE (OUTPATIENT)
Dept: OTOLARYNGOLOGY | Facility: CLINIC | Age: 61
End: 2021-07-13

## 2021-07-13 VITALS
DIASTOLIC BLOOD PRESSURE: 85 MMHG | SYSTOLIC BLOOD PRESSURE: 143 MMHG | HEIGHT: 67 IN | HEART RATE: 73 BPM | WEIGHT: 225.63 LBS | BODY MASS INDEX: 35.41 KG/M2

## 2021-07-13 DIAGNOSIS — R05.9 COUGH: ICD-10-CM

## 2021-07-13 DIAGNOSIS — J01.90 ACUTE BACTERIAL SINUSITIS: Primary | ICD-10-CM

## 2021-07-13 DIAGNOSIS — B96.89 ACUTE BACTERIAL SINUSITIS: Primary | ICD-10-CM

## 2021-07-13 DIAGNOSIS — J30.89 NON-SEASONAL ALLERGIC RHINITIS, UNSPECIFIED TRIGGER: ICD-10-CM

## 2021-07-13 PROCEDURE — 99213 PR OFFICE/OUTPT VISIT, EST, LEVL III, 20-29 MIN: ICD-10-PCS | Mod: S$GLB,,, | Performed by: OTOLARYNGOLOGY

## 2021-07-13 PROCEDURE — 99213 OFFICE O/P EST LOW 20 MIN: CPT | Mod: S$GLB,,, | Performed by: OTOLARYNGOLOGY

## 2021-07-13 PROCEDURE — 3008F PR BODY MASS INDEX (BMI) DOCUMENTED: ICD-10-PCS | Mod: CPTII,S$GLB,, | Performed by: OTOLARYNGOLOGY

## 2021-07-13 PROCEDURE — 1126F PR PAIN SEVERITY QUANTIFIED, NO PAIN PRESENT: ICD-10-PCS | Mod: S$GLB,,, | Performed by: OTOLARYNGOLOGY

## 2021-07-13 PROCEDURE — 1126F AMNT PAIN NOTED NONE PRSNT: CPT | Mod: S$GLB,,, | Performed by: OTOLARYNGOLOGY

## 2021-07-13 PROCEDURE — 3008F BODY MASS INDEX DOCD: CPT | Mod: CPTII,S$GLB,, | Performed by: OTOLARYNGOLOGY

## 2021-07-13 PROCEDURE — 99999 PR PBB SHADOW E&M-EST. PATIENT-LVL III: ICD-10-PCS | Mod: PBBFAC,,, | Performed by: OTOLARYNGOLOGY

## 2021-07-13 PROCEDURE — 99999 PR PBB SHADOW E&M-EST. PATIENT-LVL III: CPT | Mod: PBBFAC,,, | Performed by: OTOLARYNGOLOGY

## 2021-07-13 RX ORDER — BROMPHENIRAMINE MALEATE, PSEUDOEPHEDRINE HYDROCHLORIDE, AND DEXTROMETHORPHAN HYDROBROMIDE 2; 30; 10 MG/5ML; MG/5ML; MG/5ML
5 SYRUP ORAL EVERY 8 HOURS PRN
Qty: 118 ML | Refills: 0 | Status: SHIPPED | OUTPATIENT
Start: 2021-07-13 | End: 2021-07-23

## 2021-07-13 RX ORDER — CLARITHROMYCIN 500 MG/1
500 TABLET, FILM COATED ORAL EVERY 12 HOURS
Qty: 20 TABLET | Refills: 0 | Status: SHIPPED | OUTPATIENT
Start: 2021-07-13 | End: 2021-07-23

## 2021-07-19 ENCOUNTER — ANESTHESIA EVENT (OUTPATIENT)
Dept: SURGERY | Facility: OTHER | Age: 61
End: 2021-07-19
Payer: COMMERCIAL

## 2021-07-19 ENCOUNTER — OFFICE VISIT (OUTPATIENT)
Dept: OBSTETRICS AND GYNECOLOGY | Facility: CLINIC | Age: 61
End: 2021-07-19
Attending: OBSTETRICS & GYNECOLOGY
Payer: COMMERCIAL

## 2021-07-19 ENCOUNTER — HOSPITAL ENCOUNTER (OUTPATIENT)
Dept: PREADMISSION TESTING | Facility: OTHER | Age: 61
Discharge: HOME | End: 2021-07-19
Attending: OBSTETRICS & GYNECOLOGY
Payer: COMMERCIAL

## 2021-07-19 VITALS
BODY MASS INDEX: 34.25 KG/M2 | HEART RATE: 80 BPM | SYSTOLIC BLOOD PRESSURE: 148 MMHG | OXYGEN SATURATION: 97 % | TEMPERATURE: 98 F | HEIGHT: 68 IN | WEIGHT: 226 LBS | DIASTOLIC BLOOD PRESSURE: 66 MMHG

## 2021-07-19 VITALS
WEIGHT: 228.63 LBS | HEIGHT: 67 IN | DIASTOLIC BLOOD PRESSURE: 72 MMHG | SYSTOLIC BLOOD PRESSURE: 128 MMHG | BODY MASS INDEX: 35.88 KG/M2

## 2021-07-19 DIAGNOSIS — Z01.818 PREOP TESTING: Primary | ICD-10-CM

## 2021-07-19 DIAGNOSIS — N95.0 PMB (POSTMENOPAUSAL BLEEDING): ICD-10-CM

## 2021-07-19 DIAGNOSIS — Z01.818 PREOP EXAMINATION: ICD-10-CM

## 2021-07-19 DIAGNOSIS — Z01.818 PREOP EXAMINATION: Primary | ICD-10-CM

## 2021-07-19 LAB
ABO + RH BLD: NORMAL
BASOPHILS # BLD AUTO: 0.03 K/UL (ref 0–0.2)
BASOPHILS NFR BLD: 0.3 % (ref 0–1.9)
BLD GP AB SCN CELLS X3 SERPL QL: NORMAL
DIFFERENTIAL METHOD: ABNORMAL
EOSINOPHIL # BLD AUTO: 0.3 K/UL (ref 0–0.5)
EOSINOPHIL NFR BLD: 2.6 % (ref 0–8)
ERYTHROCYTE [DISTWIDTH] IN BLOOD BY AUTOMATED COUNT: 11.9 % (ref 11.5–14.5)
HCT VFR BLD AUTO: 44.5 % (ref 37–48.5)
HGB BLD-MCNC: 14.6 G/DL (ref 12–16)
IMM GRANULOCYTES # BLD AUTO: 0.03 K/UL (ref 0–0.04)
IMM GRANULOCYTES NFR BLD AUTO: 0.3 % (ref 0–0.5)
LYMPHOCYTES # BLD AUTO: 1.7 K/UL (ref 1–4.8)
LYMPHOCYTES NFR BLD: 17.4 % (ref 18–48)
MCH RBC QN AUTO: 31.3 PG (ref 27–31)
MCHC RBC AUTO-ENTMCNC: 32.8 G/DL (ref 32–36)
MCV RBC AUTO: 96 FL (ref 82–98)
MONOCYTES # BLD AUTO: 0.7 K/UL (ref 0.3–1)
MONOCYTES NFR BLD: 7.7 % (ref 4–15)
NEUTROPHILS # BLD AUTO: 6.8 K/UL (ref 1.8–7.7)
NEUTROPHILS NFR BLD: 71.7 % (ref 38–73)
NRBC BLD-RTO: 0 /100 WBC
PLATELET # BLD AUTO: 289 K/UL (ref 150–450)
PMV BLD AUTO: 10.2 FL (ref 9.2–12.9)
RBC # BLD AUTO: 4.66 M/UL (ref 4–5.4)
WBC # BLD AUTO: 9.46 K/UL (ref 3.9–12.7)

## 2021-07-19 PROCEDURE — 99211 OFF/OP EST MAY X REQ PHY/QHP: CPT | Mod: S$GLB,,, | Performed by: OBSTETRICS & GYNECOLOGY

## 2021-07-19 PROCEDURE — 99999 PR PBB SHADOW E&M-EST. PATIENT-LVL III: ICD-10-PCS | Mod: PBBFAC,,, | Performed by: OBSTETRICS & GYNECOLOGY

## 2021-07-19 PROCEDURE — 99211 PR OFFICE/OUTPT VISIT, EST, LEVL I: ICD-10-PCS | Mod: S$GLB,,, | Performed by: OBSTETRICS & GYNECOLOGY

## 2021-07-19 PROCEDURE — 99999 PR PBB SHADOW E&M-EST. PATIENT-LVL III: CPT | Mod: PBBFAC,,, | Performed by: OBSTETRICS & GYNECOLOGY

## 2021-07-19 PROCEDURE — 3008F PR BODY MASS INDEX (BMI) DOCUMENTED: ICD-10-PCS | Mod: CPTII,S$GLB,, | Performed by: OBSTETRICS & GYNECOLOGY

## 2021-07-19 PROCEDURE — 3008F BODY MASS INDEX DOCD: CPT | Mod: CPTII,S$GLB,, | Performed by: OBSTETRICS & GYNECOLOGY

## 2021-07-19 PROCEDURE — 86900 BLOOD TYPING SEROLOGIC ABO: CPT | Performed by: OBSTETRICS & GYNECOLOGY

## 2021-07-19 PROCEDURE — 1126F PR PAIN SEVERITY QUANTIFIED, NO PAIN PRESENT: ICD-10-PCS | Mod: CPTII,S$GLB,, | Performed by: OBSTETRICS & GYNECOLOGY

## 2021-07-19 PROCEDURE — 85025 COMPLETE CBC W/AUTO DIFF WBC: CPT | Performed by: ANESTHESIOLOGY

## 2021-07-19 PROCEDURE — 1126F AMNT PAIN NOTED NONE PRSNT: CPT | Mod: CPTII,S$GLB,, | Performed by: OBSTETRICS & GYNECOLOGY

## 2021-07-19 PROCEDURE — 36415 COLL VENOUS BLD VENIPUNCTURE: CPT | Performed by: ANESTHESIOLOGY

## 2021-07-19 RX ORDER — AMOXICILLIN 250 MG
1 CAPSULE ORAL 2 TIMES DAILY
Status: CANCELLED | OUTPATIENT
Start: 2021-07-19

## 2021-07-19 RX ORDER — ACETAMINOPHEN 500 MG
1000 TABLET ORAL
Status: CANCELLED | OUTPATIENT
Start: 2021-07-19 | End: 2021-07-19

## 2021-07-19 RX ORDER — FAMOTIDINE 20 MG/1
20 TABLET, FILM COATED ORAL
Status: CANCELLED | OUTPATIENT
Start: 2021-07-19

## 2021-07-19 RX ORDER — LIDOCAINE HYDROCHLORIDE 10 MG/ML
0.5 INJECTION, SOLUTION EPIDURAL; INFILTRATION; INTRACAUDAL; PERINEURAL ONCE
Status: CANCELLED | OUTPATIENT
Start: 2021-07-19 | End: 2021-07-19

## 2021-07-19 RX ORDER — LIDOCAINE HYDROCHLORIDE 10 MG/ML
0.5 INJECTION, SOLUTION EPIDURAL; INFILTRATION; INTRACAUDAL; PERINEURAL
Status: CANCELLED | OUTPATIENT
Start: 2021-07-19

## 2021-07-19 RX ORDER — IBUPROFEN 800 MG/1
800 TABLET ORAL EVERY 8 HOURS PRN
Qty: 30 TABLET | Refills: 0 | Status: CANCELLED | OUTPATIENT
Start: 2021-07-19

## 2021-07-19 RX ORDER — DIPHENHYDRAMINE HYDROCHLORIDE 50 MG/ML
25 INJECTION INTRAMUSCULAR; INTRAVENOUS EVERY 4 HOURS PRN
Status: CANCELLED | OUTPATIENT
Start: 2021-07-19

## 2021-07-19 RX ORDER — SCOLOPAMINE TRANSDERMAL SYSTEM 1 MG/1
1 PATCH, EXTENDED RELEASE TRANSDERMAL
Status: CANCELLED | OUTPATIENT
Start: 2021-07-19

## 2021-07-19 RX ORDER — MEPERIDINE HYDROCHLORIDE 100 MG/ML
12.5 INJECTION INTRAMUSCULAR; INTRAVENOUS; SUBCUTANEOUS ONCE AS NEEDED
Status: CANCELLED | OUTPATIENT
Start: 2021-07-19 | End: 2021-07-20

## 2021-07-19 RX ORDER — ONDANSETRON 4 MG/1
8 TABLET, ORALLY DISINTEGRATING ORAL EVERY 8 HOURS PRN
Status: CANCELLED | OUTPATIENT
Start: 2021-07-19

## 2021-07-19 RX ORDER — HYDROCODONE BITARTRATE AND ACETAMINOPHEN 5; 325 MG/1; MG/1
1 TABLET ORAL EVERY 4 HOURS PRN
Qty: 20 TABLET | Refills: 0 | Status: CANCELLED | OUTPATIENT
Start: 2021-07-19

## 2021-07-19 RX ORDER — SODIUM CHLORIDE, SODIUM LACTATE, POTASSIUM CHLORIDE, CALCIUM CHLORIDE 600; 310; 30; 20 MG/100ML; MG/100ML; MG/100ML; MG/100ML
INJECTION, SOLUTION INTRAVENOUS CONTINUOUS
Status: CANCELLED | OUTPATIENT
Start: 2021-07-19

## 2021-07-19 RX ORDER — PREGABALIN 25 MG/1
50 CAPSULE ORAL
Status: CANCELLED | OUTPATIENT
Start: 2021-07-19

## 2021-07-19 RX ORDER — ACETAMINOPHEN 500 MG
1000 TABLET ORAL
Status: CANCELLED | OUTPATIENT
Start: 2021-07-19

## 2021-07-19 RX ORDER — CELECOXIB 100 MG/1
400 CAPSULE ORAL
Status: CANCELLED | OUTPATIENT
Start: 2021-07-19

## 2021-07-19 RX ORDER — ONDANSETRON 2 MG/ML
4 INJECTION INTRAMUSCULAR; INTRAVENOUS DAILY PRN
Status: CANCELLED | OUTPATIENT
Start: 2021-07-19

## 2021-07-19 RX ORDER — DIPHENHYDRAMINE HCL 25 MG
25 CAPSULE ORAL EVERY 4 HOURS PRN
Status: CANCELLED | OUTPATIENT
Start: 2021-07-19

## 2021-07-19 RX ORDER — POLYETHYLENE GLYCOL 3350 17 G/17G
17 POWDER, FOR SOLUTION ORAL DAILY
Status: CANCELLED | OUTPATIENT
Start: 2021-07-19

## 2021-07-19 RX ORDER — SODIUM CHLORIDE 0.9 % (FLUSH) 0.9 %
3 SYRINGE (ML) INJECTION
Status: CANCELLED | OUTPATIENT
Start: 2021-07-19

## 2021-07-19 RX ORDER — MUPIROCIN 20 MG/G
OINTMENT TOPICAL
Status: CANCELLED | OUTPATIENT
Start: 2021-07-19

## 2021-07-22 ENCOUNTER — PATIENT MESSAGE (OUTPATIENT)
Dept: OTOLARYNGOLOGY | Facility: CLINIC | Age: 61
End: 2021-07-22

## 2021-07-28 ENCOUNTER — ANESTHESIA (OUTPATIENT)
Dept: SURGERY | Facility: OTHER | Age: 61
End: 2021-07-28
Payer: COMMERCIAL

## 2021-07-28 ENCOUNTER — HOSPITAL ENCOUNTER (OUTPATIENT)
Facility: OTHER | Age: 61
Discharge: HOME | End: 2021-07-28
Attending: OBSTETRICS & GYNECOLOGY | Admitting: OBSTETRICS & GYNECOLOGY
Payer: COMMERCIAL

## 2021-07-28 VITALS
RESPIRATION RATE: 16 BRPM | SYSTOLIC BLOOD PRESSURE: 118 MMHG | OXYGEN SATURATION: 99 % | HEART RATE: 58 BPM | BODY MASS INDEX: 34.25 KG/M2 | DIASTOLIC BLOOD PRESSURE: 65 MMHG | TEMPERATURE: 99 F | WEIGHT: 226 LBS | HEIGHT: 68 IN

## 2021-07-28 DIAGNOSIS — N95.0 PMB (POSTMENOPAUSAL BLEEDING): ICD-10-CM

## 2021-07-28 DIAGNOSIS — Z01.818 PREOP EXAMINATION: ICD-10-CM

## 2021-07-28 PROCEDURE — 58571 PR LAPAROSCOPY W TOT HYSTERECTUTERUS <=250 GRAM  W TUBE/OVARY: ICD-10-PCS | Mod: ,,, | Performed by: OBSTETRICS & GYNECOLOGY

## 2021-07-28 PROCEDURE — 25000003 PHARM REV CODE 250: Performed by: ANESTHESIOLOGY

## 2021-07-28 PROCEDURE — 58571 TLH W/T/O 250 G OR LESS: CPT | Mod: AS,,, | Performed by: NURSE PRACTITIONER

## 2021-07-28 PROCEDURE — 88302 TISSUE EXAM BY PATHOLOGIST: CPT | Performed by: STUDENT IN AN ORGANIZED HEALTH CARE EDUCATION/TRAINING PROGRAM

## 2021-07-28 PROCEDURE — 88307 PR  SURG PATH,LEVEL V: ICD-10-PCS | Mod: 26,,, | Performed by: STUDENT IN AN ORGANIZED HEALTH CARE EDUCATION/TRAINING PROGRAM

## 2021-07-28 PROCEDURE — 37000009 HC ANESTHESIA EA ADD 15 MINS: Performed by: OBSTETRICS & GYNECOLOGY

## 2021-07-28 PROCEDURE — 71000016 HC POSTOP RECOV ADDL HR: Performed by: OBSTETRICS & GYNECOLOGY

## 2021-07-28 PROCEDURE — 71000039 HC RECOVERY, EACH ADD'L HOUR: Performed by: OBSTETRICS & GYNECOLOGY

## 2021-07-28 PROCEDURE — 37000008 HC ANESTHESIA 1ST 15 MINUTES: Performed by: OBSTETRICS & GYNECOLOGY

## 2021-07-28 PROCEDURE — 88307 TISSUE EXAM BY PATHOLOGIST: CPT | Mod: 26,,, | Performed by: STUDENT IN AN ORGANIZED HEALTH CARE EDUCATION/TRAINING PROGRAM

## 2021-07-28 PROCEDURE — 88302 TISSUE EXAM BY PATHOLOGIST: CPT | Mod: 26,,, | Performed by: STUDENT IN AN ORGANIZED HEALTH CARE EDUCATION/TRAINING PROGRAM

## 2021-07-28 PROCEDURE — 71000033 HC RECOVERY, INTIAL HOUR: Performed by: OBSTETRICS & GYNECOLOGY

## 2021-07-28 PROCEDURE — 25000003 PHARM REV CODE 250: Performed by: NURSE ANESTHETIST, CERTIFIED REGISTERED

## 2021-07-28 PROCEDURE — 36000713 HC OR TIME LEV V EA ADD 15 MIN: Performed by: OBSTETRICS & GYNECOLOGY

## 2021-07-28 PROCEDURE — P9045 ALBUMIN (HUMAN), 5%, 250 ML: HCPCS | Mod: JG | Performed by: NURSE ANESTHETIST, CERTIFIED REGISTERED

## 2021-07-28 PROCEDURE — 71000015 HC POSTOP RECOV 1ST HR: Performed by: OBSTETRICS & GYNECOLOGY

## 2021-07-28 PROCEDURE — 63600175 PHARM REV CODE 636 W HCPCS: Performed by: OBSTETRICS & GYNECOLOGY

## 2021-07-28 PROCEDURE — 27201423 OPTIME MED/SURG SUP & DEVICES STERILE SUPPLY: Performed by: OBSTETRICS & GYNECOLOGY

## 2021-07-28 PROCEDURE — 63600175 PHARM REV CODE 636 W HCPCS: Mod: JG | Performed by: NURSE ANESTHETIST, CERTIFIED REGISTERED

## 2021-07-28 PROCEDURE — 25000003 PHARM REV CODE 250: Performed by: OBSTETRICS & GYNECOLOGY

## 2021-07-28 PROCEDURE — 88302 PR  SURG PATH,LEVEL II: ICD-10-PCS | Mod: 26,,, | Performed by: STUDENT IN AN ORGANIZED HEALTH CARE EDUCATION/TRAINING PROGRAM

## 2021-07-28 PROCEDURE — C2628 CATHETER, OCCLUSION: HCPCS | Performed by: OBSTETRICS & GYNECOLOGY

## 2021-07-28 PROCEDURE — 58571 PR LAPAROSCOPY W TOT HYSTERECTUTERUS <=250 GRAM  W TUBE/OVARY: ICD-10-PCS | Mod: AS,,, | Performed by: NURSE PRACTITIONER

## 2021-07-28 PROCEDURE — 88307 TISSUE EXAM BY PATHOLOGIST: CPT | Performed by: STUDENT IN AN ORGANIZED HEALTH CARE EDUCATION/TRAINING PROGRAM

## 2021-07-28 PROCEDURE — 36000712 HC OR TIME LEV V 1ST 15 MIN: Performed by: OBSTETRICS & GYNECOLOGY

## 2021-07-28 PROCEDURE — 58571 TLH W/T/O 250 G OR LESS: CPT | Mod: ,,, | Performed by: OBSTETRICS & GYNECOLOGY

## 2021-07-28 RX ORDER — FENTANYL CITRATE 50 UG/ML
INJECTION, SOLUTION INTRAMUSCULAR; INTRAVENOUS
Status: DISCONTINUED | OUTPATIENT
Start: 2021-07-28 | End: 2021-07-28

## 2021-07-28 RX ORDER — FAMOTIDINE 20 MG/1
20 TABLET, FILM COATED ORAL
Status: COMPLETED | OUTPATIENT
Start: 2021-07-28 | End: 2021-07-28

## 2021-07-28 RX ORDER — KETAMINE HCL IN 0.9 % NACL 50 MG/5 ML
SYRINGE (ML) INTRAVENOUS
Status: DISCONTINUED | OUTPATIENT
Start: 2021-07-28 | End: 2021-07-28

## 2021-07-28 RX ORDER — DEXAMETHASONE SODIUM PHOSPHATE 4 MG/ML
INJECTION, SOLUTION INTRA-ARTICULAR; INTRALESIONAL; INTRAMUSCULAR; INTRAVENOUS; SOFT TISSUE
Status: DISCONTINUED | OUTPATIENT
Start: 2021-07-28 | End: 2021-07-28

## 2021-07-28 RX ORDER — ROCURONIUM BROMIDE 10 MG/ML
INJECTION, SOLUTION INTRAVENOUS
Status: DISCONTINUED | OUTPATIENT
Start: 2021-07-28 | End: 2021-07-28

## 2021-07-28 RX ORDER — PROPOFOL 10 MG/ML
VIAL (ML) INTRAVENOUS
Status: DISCONTINUED | OUTPATIENT
Start: 2021-07-28 | End: 2021-07-28

## 2021-07-28 RX ORDER — MIDAZOLAM HYDROCHLORIDE 1 MG/ML
INJECTION INTRAMUSCULAR; INTRAVENOUS
Status: DISCONTINUED | OUTPATIENT
Start: 2021-07-28 | End: 2021-07-28

## 2021-07-28 RX ORDER — ACETAMINOPHEN 500 MG
1000 TABLET ORAL
Status: DISCONTINUED | OUTPATIENT
Start: 2021-07-28 | End: 2021-07-28 | Stop reason: SDUPTHER

## 2021-07-28 RX ORDER — HYDROCODONE BITARTRATE AND ACETAMINOPHEN 5; 325 MG/1; MG/1
1 TABLET ORAL EVERY 4 HOURS PRN
Qty: 20 TABLET | Refills: 0 | Status: SHIPPED | OUTPATIENT
Start: 2021-07-28 | End: 2021-09-27

## 2021-07-28 RX ORDER — MEPERIDINE HYDROCHLORIDE 25 MG/ML
12.5 INJECTION INTRAMUSCULAR; INTRAVENOUS; SUBCUTANEOUS ONCE AS NEEDED
Status: DISCONTINUED | OUTPATIENT
Start: 2021-07-28 | End: 2021-07-28 | Stop reason: SDUPTHER

## 2021-07-28 RX ORDER — IBUPROFEN 800 MG/1
800 TABLET ORAL EVERY 8 HOURS PRN
Qty: 30 TABLET | Refills: 0 | Status: SHIPPED | OUTPATIENT
Start: 2021-07-28 | End: 2021-09-27

## 2021-07-28 RX ORDER — ONDANSETRON 2 MG/ML
4 INJECTION INTRAMUSCULAR; INTRAVENOUS DAILY PRN
Status: DISCONTINUED | OUTPATIENT
Start: 2021-07-28 | End: 2021-07-28 | Stop reason: SDUPTHER

## 2021-07-28 RX ORDER — LIDOCAINE HYDROCHLORIDE 10 MG/ML
0.5 INJECTION, SOLUTION EPIDURAL; INFILTRATION; INTRACAUDAL; PERINEURAL
Status: DISCONTINUED | OUTPATIENT
Start: 2021-07-28 | End: 2021-07-28 | Stop reason: SDUPTHER

## 2021-07-28 RX ORDER — CELECOXIB 200 MG/1
400 CAPSULE ORAL
Status: COMPLETED | OUTPATIENT
Start: 2021-07-28 | End: 2021-07-28

## 2021-07-28 RX ORDER — MUPIROCIN 20 MG/G
OINTMENT TOPICAL
Status: DISCONTINUED | OUTPATIENT
Start: 2021-07-28 | End: 2021-07-28 | Stop reason: HOSPADM

## 2021-07-28 RX ORDER — LIDOCAINE HYDROCHLORIDE 10 MG/ML
0.5 INJECTION, SOLUTION EPIDURAL; INFILTRATION; INTRACAUDAL; PERINEURAL ONCE
Status: DISCONTINUED | OUTPATIENT
Start: 2021-07-28 | End: 2021-07-28 | Stop reason: HOSPADM

## 2021-07-28 RX ORDER — ACETAMINOPHEN 500 MG
1000 TABLET ORAL
Status: COMPLETED | OUTPATIENT
Start: 2021-07-28 | End: 2021-07-28

## 2021-07-28 RX ORDER — SODIUM CHLORIDE 0.9 % (FLUSH) 0.9 %
3 SYRINGE (ML) INJECTION
Status: DISCONTINUED | OUTPATIENT
Start: 2021-07-28 | End: 2021-07-28 | Stop reason: HOSPADM

## 2021-07-28 RX ORDER — ONDANSETRON 2 MG/ML
4 INJECTION INTRAMUSCULAR; INTRAVENOUS DAILY PRN
Status: DISCONTINUED | OUTPATIENT
Start: 2021-07-28 | End: 2021-07-28 | Stop reason: HOSPADM

## 2021-07-28 RX ORDER — OXYCODONE HYDROCHLORIDE 5 MG/1
5 TABLET ORAL
Status: DISCONTINUED | OUTPATIENT
Start: 2021-07-28 | End: 2021-07-28 | Stop reason: HOSPADM

## 2021-07-28 RX ORDER — PREGABALIN 50 MG/1
50 CAPSULE ORAL
Status: COMPLETED | OUTPATIENT
Start: 2021-07-28 | End: 2021-07-28

## 2021-07-28 RX ORDER — ONDANSETRON 2 MG/ML
INJECTION INTRAMUSCULAR; INTRAVENOUS
Status: DISCONTINUED | OUTPATIENT
Start: 2021-07-28 | End: 2021-07-28

## 2021-07-28 RX ORDER — SODIUM CHLORIDE, SODIUM LACTATE, POTASSIUM CHLORIDE, CALCIUM CHLORIDE 600; 310; 30; 20 MG/100ML; MG/100ML; MG/100ML; MG/100ML
INJECTION, SOLUTION INTRAVENOUS CONTINUOUS
Status: DISCONTINUED | OUTPATIENT
Start: 2021-07-28 | End: 2021-07-28 | Stop reason: HOSPADM

## 2021-07-28 RX ORDER — HYDROMORPHONE HYDROCHLORIDE 2 MG/ML
0.4 INJECTION, SOLUTION INTRAMUSCULAR; INTRAVENOUS; SUBCUTANEOUS EVERY 5 MIN PRN
Status: DISCONTINUED | OUTPATIENT
Start: 2021-07-28 | End: 2021-07-28 | Stop reason: HOSPADM

## 2021-07-28 RX ORDER — LIDOCAINE HCL/PF 100 MG/5ML
SYRINGE (ML) INTRAVENOUS
Status: DISCONTINUED | OUTPATIENT
Start: 2021-07-28 | End: 2021-07-28

## 2021-07-28 RX ORDER — SCOLOPAMINE TRANSDERMAL SYSTEM 1 MG/1
1 PATCH, EXTENDED RELEASE TRANSDERMAL
Status: DISCONTINUED | OUTPATIENT
Start: 2021-07-28 | End: 2021-07-28 | Stop reason: HOSPADM

## 2021-07-28 RX ORDER — MEPERIDINE HYDROCHLORIDE 25 MG/ML
12.5 INJECTION INTRAMUSCULAR; INTRAVENOUS; SUBCUTANEOUS ONCE AS NEEDED
Status: DISCONTINUED | OUTPATIENT
Start: 2021-07-28 | End: 2021-07-28 | Stop reason: HOSPADM

## 2021-07-28 RX ORDER — CEFAZOLIN SODIUM 1 G/3ML
2 INJECTION, POWDER, FOR SOLUTION INTRAMUSCULAR; INTRAVENOUS
Status: COMPLETED | OUTPATIENT
Start: 2021-07-28 | End: 2021-07-28

## 2021-07-28 RX ORDER — ALBUMIN HUMAN 50 G/1000ML
SOLUTION INTRAVENOUS CONTINUOUS PRN
Status: DISCONTINUED | OUTPATIENT
Start: 2021-07-28 | End: 2021-07-28

## 2021-07-28 RX ADMIN — INDIGO CARMINE 40 MG: 8 INJECTION, SOLUTION INTRAMUSCULAR; INTRAVENOUS at 01:07

## 2021-07-28 RX ADMIN — OXYCODONE HYDROCHLORIDE 5 MG: 5 TABLET ORAL at 02:07

## 2021-07-28 RX ADMIN — ONDANSETRON HYDROCHLORIDE 4 MG: 2 INJECTION INTRAMUSCULAR; INTRAVENOUS at 11:07

## 2021-07-28 RX ADMIN — LIDOCAINE HYDROCHLORIDE 100 MG: 20 INJECTION, SOLUTION INTRAVENOUS at 11:07

## 2021-07-28 RX ADMIN — MIDAZOLAM HYDROCHLORIDE 2 MG: 1 INJECTION, SOLUTION INTRAMUSCULAR; INTRAVENOUS at 11:07

## 2021-07-28 RX ADMIN — GLYCOPYRROLATE 0.2 MG: 0.2 INJECTION, SOLUTION INTRAMUSCULAR; INTRAVITREAL at 11:07

## 2021-07-28 RX ADMIN — FAMOTIDINE 20 MG: 20 TABLET ORAL at 10:07

## 2021-07-28 RX ADMIN — PREGABALIN 50 MG: 50 CAPSULE ORAL at 10:07

## 2021-07-28 RX ADMIN — FENTANYL CITRATE 100 MCG: 50 INJECTION, SOLUTION INTRAMUSCULAR; INTRAVENOUS at 11:07

## 2021-07-28 RX ADMIN — DEXAMETHASONE SODIUM PHOSPHATE 8 MG: 4 INJECTION, SOLUTION INTRAMUSCULAR; INTRAVENOUS at 11:07

## 2021-07-28 RX ADMIN — ALBUMIN (HUMAN): 12.5 SOLUTION INTRAVENOUS at 11:07

## 2021-07-28 RX ADMIN — ACETAMINOPHEN 1000 MG: 500 TABLET, FILM COATED ORAL at 10:07

## 2021-07-28 RX ADMIN — CELECOXIB 400 MG: 200 CAPSULE ORAL at 10:07

## 2021-07-28 RX ADMIN — FENTANYL CITRATE 50 MCG: 50 INJECTION, SOLUTION INTRAMUSCULAR; INTRAVENOUS at 12:07

## 2021-07-28 RX ADMIN — PROPOFOL 200 MG: 10 INJECTION, EMULSION INTRAVENOUS at 11:07

## 2021-07-28 RX ADMIN — Medication 30 MG: at 11:07

## 2021-07-28 RX ADMIN — CEFAZOLIN 2 G: 330 INJECTION, POWDER, FOR SOLUTION INTRAMUSCULAR; INTRAVENOUS at 11:07

## 2021-07-28 RX ADMIN — ROCURONIUM BROMIDE 20 MG: 10 SOLUTION INTRAVENOUS at 12:07

## 2021-07-28 RX ADMIN — SUGAMMADEX 400 MG: 100 INJECTION, SOLUTION INTRAVENOUS at 01:07

## 2021-07-28 RX ADMIN — SCOLOPAMINE TRANSDERMAL SYSTEM 1 PATCH: 1 PATCH, EXTENDED RELEASE TRANSDERMAL at 10:07

## 2021-07-28 RX ADMIN — ROCURONIUM BROMIDE 50 MG: 10 SOLUTION INTRAVENOUS at 11:07

## 2021-07-28 RX ADMIN — FENTANYL CITRATE 50 MCG: 50 INJECTION, SOLUTION INTRAMUSCULAR; INTRAVENOUS at 01:07

## 2021-07-30 ENCOUNTER — TELEPHONE (OUTPATIENT)
Dept: OBSTETRICS AND GYNECOLOGY | Facility: CLINIC | Age: 61
End: 2021-07-30

## 2021-08-03 ENCOUNTER — PATIENT MESSAGE (OUTPATIENT)
Dept: OBSTETRICS AND GYNECOLOGY | Facility: CLINIC | Age: 61
End: 2021-08-03

## 2021-08-05 LAB
FINAL PATHOLOGIC DIAGNOSIS: NORMAL
Lab: NORMAL

## 2021-08-19 ENCOUNTER — OFFICE VISIT (OUTPATIENT)
Dept: OBSTETRICS AND GYNECOLOGY | Facility: CLINIC | Age: 61
End: 2021-08-19
Attending: OBSTETRICS & GYNECOLOGY
Payer: COMMERCIAL

## 2021-08-19 VITALS
SYSTOLIC BLOOD PRESSURE: 112 MMHG | WEIGHT: 225.44 LBS | BODY MASS INDEX: 34.17 KG/M2 | DIASTOLIC BLOOD PRESSURE: 74 MMHG | HEIGHT: 68 IN

## 2021-08-19 DIAGNOSIS — Z12.31 ENCOUNTER FOR SCREENING MAMMOGRAM FOR BREAST CANCER: ICD-10-CM

## 2021-08-19 DIAGNOSIS — Z09 POSTOP CHECK: Primary | ICD-10-CM

## 2021-08-19 PROBLEM — N93.8 DUB (DYSFUNCTIONAL UTERINE BLEEDING): Status: RESOLVED | Noted: 2021-01-25 | Resolved: 2021-08-19

## 2021-08-19 PROBLEM — N84.1 ENDOCERVICAL POLYP: Status: RESOLVED | Noted: 2021-02-23 | Resolved: 2021-08-19

## 2021-08-19 PROBLEM — N95.0 POSTMENOPAUSAL BLEEDING: Status: RESOLVED | Noted: 2021-02-23 | Resolved: 2021-08-19

## 2021-08-19 PROCEDURE — 1159F PR MEDICATION LIST DOCUMENTED IN MEDICAL RECORD: ICD-10-PCS | Mod: CPTII,S$GLB,, | Performed by: OBSTETRICS & GYNECOLOGY

## 2021-08-19 PROCEDURE — 3078F DIAST BP <80 MM HG: CPT | Mod: CPTII,S$GLB,, | Performed by: OBSTETRICS & GYNECOLOGY

## 2021-08-19 PROCEDURE — 99999 PR PBB SHADOW E&M-EST. PATIENT-LVL III: ICD-10-PCS | Mod: PBBFAC,,, | Performed by: OBSTETRICS & GYNECOLOGY

## 2021-08-19 PROCEDURE — 1126F PR PAIN SEVERITY QUANTIFIED, NO PAIN PRESENT: ICD-10-PCS | Mod: CPTII,S$GLB,, | Performed by: OBSTETRICS & GYNECOLOGY

## 2021-08-19 PROCEDURE — 99024 POSTOP FOLLOW-UP VISIT: CPT | Mod: S$GLB,,, | Performed by: OBSTETRICS & GYNECOLOGY

## 2021-08-19 PROCEDURE — 99999 PR PBB SHADOW E&M-EST. PATIENT-LVL III: CPT | Mod: PBBFAC,,, | Performed by: OBSTETRICS & GYNECOLOGY

## 2021-08-19 PROCEDURE — 3008F BODY MASS INDEX DOCD: CPT | Mod: CPTII,S$GLB,, | Performed by: OBSTETRICS & GYNECOLOGY

## 2021-08-19 PROCEDURE — 3008F PR BODY MASS INDEX (BMI) DOCUMENTED: ICD-10-PCS | Mod: CPTII,S$GLB,, | Performed by: OBSTETRICS & GYNECOLOGY

## 2021-08-19 PROCEDURE — 99024 PR POST-OP FOLLOW-UP VISIT: ICD-10-PCS | Mod: S$GLB,,, | Performed by: OBSTETRICS & GYNECOLOGY

## 2021-08-19 PROCEDURE — 3074F PR MOST RECENT SYSTOLIC BLOOD PRESSURE < 130 MM HG: ICD-10-PCS | Mod: CPTII,S$GLB,, | Performed by: OBSTETRICS & GYNECOLOGY

## 2021-08-19 PROCEDURE — 3074F SYST BP LT 130 MM HG: CPT | Mod: CPTII,S$GLB,, | Performed by: OBSTETRICS & GYNECOLOGY

## 2021-08-19 PROCEDURE — 1126F AMNT PAIN NOTED NONE PRSNT: CPT | Mod: CPTII,S$GLB,, | Performed by: OBSTETRICS & GYNECOLOGY

## 2021-08-19 PROCEDURE — 1159F MED LIST DOCD IN RCRD: CPT | Mod: CPTII,S$GLB,, | Performed by: OBSTETRICS & GYNECOLOGY

## 2021-08-19 PROCEDURE — 3078F PR MOST RECENT DIASTOLIC BLOOD PRESSURE < 80 MM HG: ICD-10-PCS | Mod: CPTII,S$GLB,, | Performed by: OBSTETRICS & GYNECOLOGY

## 2021-08-19 PROCEDURE — 3044F PR MOST RECENT HEMOGLOBIN A1C LEVEL <7.0%: ICD-10-PCS | Mod: CPTII,S$GLB,, | Performed by: OBSTETRICS & GYNECOLOGY

## 2021-08-19 PROCEDURE — 3044F HG A1C LEVEL LT 7.0%: CPT | Mod: CPTII,S$GLB,, | Performed by: OBSTETRICS & GYNECOLOGY

## 2021-09-27 ENCOUNTER — OFFICE VISIT (OUTPATIENT)
Dept: OBSTETRICS AND GYNECOLOGY | Facility: CLINIC | Age: 61
End: 2021-09-27
Attending: OBSTETRICS & GYNECOLOGY
Payer: COMMERCIAL

## 2021-09-27 VITALS
BODY MASS INDEX: 34.95 KG/M2 | WEIGHT: 222.69 LBS | DIASTOLIC BLOOD PRESSURE: 70 MMHG | HEIGHT: 67 IN | SYSTOLIC BLOOD PRESSURE: 108 MMHG

## 2021-09-27 DIAGNOSIS — Z09 POSTOP CHECK: Primary | ICD-10-CM

## 2021-09-27 PROCEDURE — 3008F PR BODY MASS INDEX (BMI) DOCUMENTED: ICD-10-PCS | Mod: CPTII,S$GLB,, | Performed by: OBSTETRICS & GYNECOLOGY

## 2021-09-27 PROCEDURE — 3078F DIAST BP <80 MM HG: CPT | Mod: CPTII,S$GLB,, | Performed by: OBSTETRICS & GYNECOLOGY

## 2021-09-27 PROCEDURE — 3078F PR MOST RECENT DIASTOLIC BLOOD PRESSURE < 80 MM HG: ICD-10-PCS | Mod: CPTII,S$GLB,, | Performed by: OBSTETRICS & GYNECOLOGY

## 2021-09-27 PROCEDURE — 1159F PR MEDICATION LIST DOCUMENTED IN MEDICAL RECORD: ICD-10-PCS | Mod: CPTII,S$GLB,, | Performed by: OBSTETRICS & GYNECOLOGY

## 2021-09-27 PROCEDURE — 3044F PR MOST RECENT HEMOGLOBIN A1C LEVEL <7.0%: ICD-10-PCS | Mod: CPTII,S$GLB,, | Performed by: OBSTETRICS & GYNECOLOGY

## 2021-09-27 PROCEDURE — 3074F SYST BP LT 130 MM HG: CPT | Mod: CPTII,S$GLB,, | Performed by: OBSTETRICS & GYNECOLOGY

## 2021-09-27 PROCEDURE — 99024 PR POST-OP FOLLOW-UP VISIT: ICD-10-PCS | Mod: S$GLB,,, | Performed by: OBSTETRICS & GYNECOLOGY

## 2021-09-27 PROCEDURE — 99999 PR PBB SHADOW E&M-EST. PATIENT-LVL III: CPT | Mod: PBBFAC,,, | Performed by: OBSTETRICS & GYNECOLOGY

## 2021-09-27 PROCEDURE — 99999 PR PBB SHADOW E&M-EST. PATIENT-LVL III: ICD-10-PCS | Mod: PBBFAC,,, | Performed by: OBSTETRICS & GYNECOLOGY

## 2021-09-27 PROCEDURE — 1159F MED LIST DOCD IN RCRD: CPT | Mod: CPTII,S$GLB,, | Performed by: OBSTETRICS & GYNECOLOGY

## 2021-09-27 PROCEDURE — 3074F PR MOST RECENT SYSTOLIC BLOOD PRESSURE < 130 MM HG: ICD-10-PCS | Mod: CPTII,S$GLB,, | Performed by: OBSTETRICS & GYNECOLOGY

## 2021-09-27 PROCEDURE — 99024 POSTOP FOLLOW-UP VISIT: CPT | Mod: S$GLB,,, | Performed by: OBSTETRICS & GYNECOLOGY

## 2021-09-27 PROCEDURE — 3008F BODY MASS INDEX DOCD: CPT | Mod: CPTII,S$GLB,, | Performed by: OBSTETRICS & GYNECOLOGY

## 2021-09-27 PROCEDURE — 3044F HG A1C LEVEL LT 7.0%: CPT | Mod: CPTII,S$GLB,, | Performed by: OBSTETRICS & GYNECOLOGY

## 2021-09-29 ENCOUNTER — HOSPITAL ENCOUNTER (OUTPATIENT)
Dept: RADIOLOGY | Facility: HOSPITAL | Age: 61
Discharge: HOME | End: 2021-09-29
Attending: OBSTETRICS & GYNECOLOGY
Payer: COMMERCIAL

## 2021-09-29 DIAGNOSIS — Z12.31 ENCOUNTER FOR SCREENING MAMMOGRAM FOR BREAST CANCER: ICD-10-CM

## 2021-09-29 PROCEDURE — 77063 MAMMO DIGITAL SCREENING BILAT WITH TOMO: ICD-10-PCS | Mod: 26,,, | Performed by: RADIOLOGY

## 2021-09-29 PROCEDURE — 77067 SCR MAMMO BI INCL CAD: CPT | Mod: 26,,, | Performed by: RADIOLOGY

## 2021-09-29 PROCEDURE — 77067 MAMMO DIGITAL SCREENING BILAT WITH TOMO: ICD-10-PCS | Mod: 26,,, | Performed by: RADIOLOGY

## 2021-09-29 PROCEDURE — 77063 BREAST TOMOSYNTHESIS BI: CPT | Mod: 26,,, | Performed by: RADIOLOGY

## 2021-09-29 PROCEDURE — 77067 SCR MAMMO BI INCL CAD: CPT | Mod: TC

## 2021-09-30 ENCOUNTER — IMMUNIZATION (OUTPATIENT)
Dept: INTERNAL MEDICINE | Facility: CLINIC | Age: 61
End: 2021-09-30

## 2021-09-30 DIAGNOSIS — Z23 NEED FOR VACCINATION: Primary | ICD-10-CM

## 2021-09-30 PROCEDURE — 91300 COVID-19, MRNA, LNP-S, PF, 30 MCG/0.3 ML DOSE VACCINE: CPT | Mod: PBBFAC | Performed by: FAMILY MEDICINE

## 2021-09-30 PROCEDURE — 0003A COVID-19, MRNA, LNP-S, PF, 30 MCG/0.3 ML DOSE VACCINE: CPT | Mod: PBBFAC | Performed by: FAMILY MEDICINE

## 2021-10-22 ENCOUNTER — PATIENT MESSAGE (OUTPATIENT)
Dept: OPTOMETRY | Facility: CLINIC | Age: 61
End: 2021-10-22
Payer: COMMERCIAL

## 2021-12-14 ENCOUNTER — OFFICE VISIT (OUTPATIENT)
Dept: OPTOMETRY | Facility: CLINIC | Age: 61
End: 2021-12-14
Payer: COMMERCIAL

## 2021-12-14 DIAGNOSIS — H04.123 BILATERAL DRY EYES: ICD-10-CM

## 2021-12-14 DIAGNOSIS — H25.13 NUCLEAR SCLEROSIS OF BOTH EYES: Primary | ICD-10-CM

## 2021-12-14 DIAGNOSIS — H52.03 HYPEROPIA OF BOTH EYES WITH ASTIGMATISM AND PRESBYOPIA: ICD-10-CM

## 2021-12-14 DIAGNOSIS — H52.203 HYPEROPIA OF BOTH EYES WITH ASTIGMATISM AND PRESBYOPIA: ICD-10-CM

## 2021-12-14 DIAGNOSIS — H52.4 HYPEROPIA OF BOTH EYES WITH ASTIGMATISM AND PRESBYOPIA: ICD-10-CM

## 2021-12-14 PROCEDURE — 99999 PR PBB SHADOW E&M-EST. PATIENT-LVL III: CPT | Mod: PBBFAC,,, | Performed by: OPTOMETRIST

## 2021-12-14 PROCEDURE — 99999 PR PBB SHADOW E&M-EST. PATIENT-LVL III: ICD-10-PCS | Mod: PBBFAC,,, | Performed by: OPTOMETRIST

## 2021-12-14 PROCEDURE — 92015 PR REFRACTION: ICD-10-PCS | Mod: S$GLB,,, | Performed by: OPTOMETRIST

## 2021-12-14 PROCEDURE — 92014 COMPRE OPH EXAM EST PT 1/>: CPT | Mod: S$GLB,,, | Performed by: OPTOMETRIST

## 2021-12-14 PROCEDURE — 92015 DETERMINE REFRACTIVE STATE: CPT | Mod: S$GLB,,, | Performed by: OPTOMETRIST

## 2021-12-14 PROCEDURE — 92014 PR EYE EXAM, EST PATIENT,COMPREHESV: ICD-10-PCS | Mod: S$GLB,,, | Performed by: OPTOMETRIST

## 2021-12-29 ENCOUNTER — LAB VISIT (OUTPATIENT)
Dept: PRIMARY CARE CLINIC | Facility: OTHER | Age: 61
End: 2021-12-29

## 2021-12-29 ENCOUNTER — PATIENT MESSAGE (OUTPATIENT)
Dept: ADMINISTRATIVE | Facility: OTHER | Age: 61
End: 2021-12-29
Payer: COMMERCIAL

## 2021-12-29 DIAGNOSIS — R09.81 NASAL CONGESTION: Primary | ICD-10-CM

## 2021-12-29 LAB
CTP QC/QA: YES
SARS-COV-2 AG RESP QL IA.RAPID: POSITIVE

## 2022-01-03 ENCOUNTER — PATIENT MESSAGE (OUTPATIENT)
Dept: INTERNAL MEDICINE | Facility: CLINIC | Age: 62
End: 2022-01-03
Payer: COMMERCIAL

## 2022-01-25 ENCOUNTER — OFFICE VISIT (OUTPATIENT)
Dept: OBSTETRICS AND GYNECOLOGY | Facility: CLINIC | Age: 62
End: 2022-01-25
Attending: OBSTETRICS & GYNECOLOGY
Payer: COMMERCIAL

## 2022-01-25 VITALS
HEIGHT: 67 IN | SYSTOLIC BLOOD PRESSURE: 118 MMHG | BODY MASS INDEX: 35.29 KG/M2 | WEIGHT: 224.88 LBS | DIASTOLIC BLOOD PRESSURE: 78 MMHG

## 2022-01-25 DIAGNOSIS — N94.10 DYSPAREUNIA IN FEMALE: ICD-10-CM

## 2022-01-25 DIAGNOSIS — Z78.0 MENOPAUSE: ICD-10-CM

## 2022-01-25 DIAGNOSIS — Z01.419 ENCOUNTER FOR GYNECOLOGICAL EXAMINATION: Primary | ICD-10-CM

## 2022-01-25 PROCEDURE — 3078F DIAST BP <80 MM HG: CPT | Mod: CPTII,S$GLB,, | Performed by: OBSTETRICS & GYNECOLOGY

## 2022-01-25 PROCEDURE — 1159F PR MEDICATION LIST DOCUMENTED IN MEDICAL RECORD: ICD-10-PCS | Mod: CPTII,S$GLB,, | Performed by: OBSTETRICS & GYNECOLOGY

## 2022-01-25 PROCEDURE — 3078F PR MOST RECENT DIASTOLIC BLOOD PRESSURE < 80 MM HG: ICD-10-PCS | Mod: CPTII,S$GLB,, | Performed by: OBSTETRICS & GYNECOLOGY

## 2022-01-25 PROCEDURE — 3074F PR MOST RECENT SYSTOLIC BLOOD PRESSURE < 130 MM HG: ICD-10-PCS | Mod: CPTII,S$GLB,, | Performed by: OBSTETRICS & GYNECOLOGY

## 2022-01-25 PROCEDURE — 1159F MED LIST DOCD IN RCRD: CPT | Mod: CPTII,S$GLB,, | Performed by: OBSTETRICS & GYNECOLOGY

## 2022-01-25 PROCEDURE — 3074F SYST BP LT 130 MM HG: CPT | Mod: CPTII,S$GLB,, | Performed by: OBSTETRICS & GYNECOLOGY

## 2022-01-25 PROCEDURE — 99999 PR PBB SHADOW E&M-EST. PATIENT-LVL III: ICD-10-PCS | Mod: PBBFAC,,, | Performed by: OBSTETRICS & GYNECOLOGY

## 2022-01-25 PROCEDURE — 3008F PR BODY MASS INDEX (BMI) DOCUMENTED: ICD-10-PCS | Mod: CPTII,S$GLB,, | Performed by: OBSTETRICS & GYNECOLOGY

## 2022-01-25 PROCEDURE — 99396 PR PREVENTIVE VISIT,EST,40-64: ICD-10-PCS | Mod: S$GLB,,, | Performed by: OBSTETRICS & GYNECOLOGY

## 2022-01-25 PROCEDURE — 3008F BODY MASS INDEX DOCD: CPT | Mod: CPTII,S$GLB,, | Performed by: OBSTETRICS & GYNECOLOGY

## 2022-01-25 PROCEDURE — 99999 PR PBB SHADOW E&M-EST. PATIENT-LVL III: CPT | Mod: PBBFAC,,, | Performed by: OBSTETRICS & GYNECOLOGY

## 2022-01-25 PROCEDURE — 99396 PREV VISIT EST AGE 40-64: CPT | Mod: S$GLB,,, | Performed by: OBSTETRICS & GYNECOLOGY

## 2022-01-25 RX ORDER — ESTRADIOL 2 MG/1
2 TABLET ORAL DAILY
Qty: 90 TABLET | Refills: 3 | Status: SHIPPED | OUTPATIENT
Start: 2022-01-25 | End: 2023-01-17 | Stop reason: SDUPTHER

## 2022-01-25 RX ORDER — PRASTERONE 6.5 MG/1
6.5 INSERT VAGINAL NIGHTLY
Qty: 28 EACH | Refills: 11 | Status: SHIPPED | OUTPATIENT
Start: 2022-01-25

## 2022-01-25 RX ORDER — PROGESTERONE 200 MG/1
CAPSULE ORAL
Qty: 90 CAPSULE | Refills: 3 | Status: SHIPPED | OUTPATIENT
Start: 2022-01-25 | End: 2023-02-24 | Stop reason: SDUPTHER

## 2022-01-25 NOTE — PROGRESS NOTES
Subjective:       Patient ID: Vanessa Olsen is a 61 y.o. female.    Chief Complaint:  Well Woman (Pap/hpv 19, Neg  --  mg 21, birads 1  --  dexa 19, normal  --  Cscope 2018, polyp (5 yr) )      History of Present Illness.  Vanessa Olsen is a 61 y.o. female.  She has no breast or urinary symptoms.  She has no pelvic pain or vaginal discharge.      Current HRT Regimen:  Estradiol 2 mg QD  Progesterone 200 mg QD  Intrarosa twice weekly  Vitamin D3 5000 IU QD    GYN & OB History  Patient's last menstrual period was 2013.   Pap: 12/3/2019 Normal HPV neg  Mammogram: 21 Birads 1  Colonoscopy: 2018 polyp  DEXA: 19 Normal  PCP:  Dr. Odom  Routine Labs:   21    OB History    Para Term  AB Living   0 0 0 0 0 0   SAB IAB Ectopic Multiple Live Births   0 0 0 0 0   Obstetric Comments   Menarche ~ 10       Past Medical History:   Diagnosis Date    Allergy     Breast disorder     Left Breast Bx - Benign     Cataract     Fracture, ribs     H/O cold sores     History of robot-assisted laparoscopic hysterectomy 2021    Hormone replacement therapy (HRT)     Menopause     PONV (postoperative nausea and vomiting)      Past Surgical History:   Procedure Laterality Date    BREAST BIOPSY Left     benign  (11:00  o'clock ?? )     CHOLECYSTECTOMY      COLONOSCOPY N/A 3/5/2018    Procedure: COLONOSCOPY;  Surgeon: Francisco Du MD;  Location: [address];  Service: Endoscopy;  Laterality: N/A;  3 year f/u - history of colon polyps - ER    CYSTOSCOPY N/A 2021    Procedure: CYSTOSCOPY;  Surgeon: Isaura Cassidy MD;  Location: [facility];  Service: OB/GYN;  Laterality: N/A;    HYSTERECTOMY  2021    HYSTEROSCOPY N/A 2021    Procedure: HYSTEROSCOPY;  Surgeon: Isaura Cassidy MD;  Location: [facility];  Service: OB/GYN;  Laterality: N/A;    OOPHORECTOMY      ROBOT-ASSISTED LAPAROSCOPIC ABDOMINAL HYSTERECTOMY USING DA  VANESSA XI Bilateral 7/28/2021    Procedure: XI ROBOTIC HYSTERECTOMY;  Surgeon: Isaura Cassidy MD;  Location: [facility] OR;  Service: OB/GYN;  Laterality: Bilateral;  BSO    ROBOT-ASSISTED LAPAROSCOPIC SALPINGO-OOPHORECTOMY Bilateral 7/28/2021    Procedure: ROBOTIC SALPINGO-OOPHORECTOMY;  Surgeon: Isaura Cassidy MD;  Location: [facility] OR;  Service: OB/GYN;  Laterality: Bilateral;    WISDOM TOOTH EXTRACTION       Family History   Problem Relation Age of Onset    Cataracts Mother     Hypertension Mother     Heart disease Mother         at fib and pacemaker    Hypertension Father     Colon polyps Father 82        large poly needing surgery    Pulmonary embolism Father     Glaucoma Brother     Hypertension Brother     Colon polyps Brother 58        several advanced colon adenomas    Breast cancer Paternal Aunt     Breast cancer Paternal Cousin     Lung cancer Brother     Prostate cancer Paternal Uncle     Amblyopia Neg Hx     Blindness Neg Hx     Diabetes Neg Hx     Macular degeneration Neg Hx     Retinal detachment Neg Hx     Strabismus Neg Hx     Stroke Neg Hx     Thyroid disease Neg Hx     Celiac disease Neg Hx     Cirrhosis Neg Hx     Colon cancer Neg Hx     Esophageal cancer Neg Hx     Inflammatory bowel disease Neg Hx     Liver disease Neg Hx     Rectal cancer Neg Hx     Stomach cancer Neg Hx     Liver cancer Neg Hx     Ulcerative colitis Neg Hx     Ovarian cancer Neg Hx      Social History     Tobacco Use    Smoking status: Never Smoker    Smokeless tobacco: Never Used   Substance Use Topics    Alcohol use: Yes     Comment: socially    Drug use: No       Current Outpatient Medications:     cetirizine (ZYRTEC) 5 MG tablet, Take 5 mg by mouth once daily., Disp: , Rfl:     ergocalciferol, vitamin D2, (VITAMIN D ORAL), Take by mouth., Disp: , Rfl:     peg 400-propylene glycol 0.4-0.3 % Drop, Apply 1 drop to eye 3 (three) times daily., Disp: , Rfl:     RESTASIS 0.05 % ophthalmic  "emulsion, PLACE 1 DROP (0.4MLS) INTO BOTH EYES TWO TIMES A DAY, Disp: 180 vial, Rfl: 3    estradioL (ESTRACE) 2 MG tablet, Take 1 tablet (2 mg total) by mouth once daily., Disp: 90 tablet, Rfl: 3    PENCICLOVIR TOP, 1 % by Other route., Disp: , Rfl:     prasterone, dhea, (INTRAROSA) 6.5 mg Inst, Place 6.5 mg vaginally every evening., Disp: 28 each, Rfl: 11    progesterone (PROMETRIUM) 200 MG capsule, Take 1 capsule by mouth 30-60 minutes before bed every night, Disp: 90 capsule, Rfl: 3    Review of patient's allergies indicates:  No Known Allergies    Review of Systems  Review of Systems   Constitutional: Negative for fatigue.   HENT: Negative for trouble swallowing.    Eyes: Negative for visual disturbance.   Respiratory: Negative for cough and shortness of breath.    Cardiovascular: Negative for chest pain.   Gastrointestinal: Negative for abdominal distention, abdominal pain, blood in stool, nausea and vomiting.   Genitourinary: Negative for difficulty urinating, dyspareunia, dysuria, flank pain, frequency, hematuria, pelvic pain, urgency, vaginal bleeding, vaginal discharge and vaginal pain.   Musculoskeletal: Negative for arthralgias.   Skin: Negative for rash.   Neurological: Negative for dizziness and headaches.   Psychiatric/Behavioral: Negative for sleep disturbance. The patient is not nervous/anxious.         Objective:     Vitals:    01/25/22 1038   BP: 118/78   Weight: 102 kg (224 lb 13.9 oz)   Height: 5' 7" (1.702 m)   PainSc: 0-No pain     Body mass index is 35.22 kg/m².    Physical Exam:   Constitutional: She is oriented to person, place, and time. Vital signs are normal. She appears well-developed and well-nourished.    HENT:   Head: Normocephalic.     Neck: No thyromegaly present.     Pulmonary/Chest: Right breast exhibits no mass, no nipple discharge, no skin change, no tenderness and no swelling. Left breast exhibits no mass, no nipple discharge, no skin change, no tenderness and no swelling. " Breasts are symmetrical.        Abdominal: Soft. Normal appearance and bowel sounds are normal. She exhibits no distension. There is no abdominal tenderness.     Genitourinary:    Vagina and rectum normal.   Rectum:      Guaiac result negative.   Guaiac negative stool.    Pelvic exam was performed with patient supine.   There is no rash, tenderness, lesion or injury on the right labia. There is no rash, tenderness, lesion or injury on the left labia. Right adnexum displays no mass, no tenderness and no fullness. Left adnexum displays no mass, no tenderness and no fullness. No erythema or  no vaginal discharge in the vagina. Cervix is absent.Uterus is absent.           Musculoskeletal: Normal range of motion.      Lymphadenopathy:        Right: No inguinal and no supraclavicular adenopathy present.        Left: No inguinal and no supraclavicular adenopathy present.    Neurological: She is alert and oriented to person, place, and time.    Skin: Skin is warm and dry.    Psychiatric: She has a normal mood and affect.        Assessment/ Plan:     Encounter for gynecological examination    Menopause  -     estradioL (ESTRACE) 2 MG tablet; Take 1 tablet (2 mg total) by mouth once daily.  Dispense: 90 tablet; Refill: 3  -     progesterone (PROMETRIUM) 200 MG capsule; Take 1 capsule by mouth 30-60 minutes before bed every night  Dispense: 90 capsule; Refill: 3    Dyspareunia in female  -     prasterone, dhea, (INTRAROSA) 6.5 mg Inst; Place 6.5 mg vaginally every evening.  Dispense: 28 each; Refill: 11    Continue:  Estradiol 2 mg QD  Progesterone 200 mg QD  Intrarosa twice weekly  Vitamin D3 5000 IU QD  Self breast exam and mammography discussed  Routine colonoscopy discussed.  Diet and exercise discussed.  Recommend routine bone mineral density testing.  Yearly influenza vaccination discussed.  Follow-up with me in 1 year.

## 2022-01-26 ENCOUNTER — LAB VISIT (OUTPATIENT)
Dept: LAB | Facility: HOSPITAL | Age: 62
End: 2022-01-26
Attending: INTERNAL MEDICINE
Payer: COMMERCIAL

## 2022-01-26 DIAGNOSIS — Z00.00 PREVENTATIVE HEALTH CARE: ICD-10-CM

## 2022-01-26 LAB
ALBUMIN SERPL BCP-MCNC: 3.5 G/DL (ref 3.5–5.2)
ALP SERPL-CCNC: 80 U/L (ref 55–135)
ALT SERPL W/O P-5'-P-CCNC: 13 U/L (ref 10–44)
ANION GAP SERPL CALC-SCNC: 8 MMOL/L (ref 8–16)
AST SERPL-CCNC: 15 U/L (ref 10–40)
BASOPHILS # BLD AUTO: 0.02 K/UL (ref 0–0.2)
BASOPHILS NFR BLD: 0.3 % (ref 0–1.9)
BILIRUB SERPL-MCNC: 0.6 MG/DL (ref 0.1–1)
BUN SERPL-MCNC: 11 MG/DL (ref 8–23)
CALCIUM SERPL-MCNC: 9.4 MG/DL (ref 8.7–10.5)
CHLORIDE SERPL-SCNC: 107 MMOL/L (ref 95–110)
CHOLEST SERPL-MCNC: 177 MG/DL (ref 120–199)
CHOLEST/HDLC SERPL: 3.7 {RATIO} (ref 2–5)
CO2 SERPL-SCNC: 24 MMOL/L (ref 23–29)
CREAT SERPL-MCNC: 1 MG/DL (ref 0.5–1.4)
DIFFERENTIAL METHOD: ABNORMAL
EOSINOPHIL # BLD AUTO: 0.2 K/UL (ref 0–0.5)
EOSINOPHIL NFR BLD: 3 % (ref 0–8)
ERYTHROCYTE [DISTWIDTH] IN BLOOD BY AUTOMATED COUNT: 11.9 % (ref 11.5–14.5)
EST. GFR  (AFRICAN AMERICAN): >60 ML/MIN/1.73 M^2
EST. GFR  (NON AFRICAN AMERICAN): >60 ML/MIN/1.73 M^2
GLUCOSE SERPL-MCNC: 97 MG/DL (ref 70–110)
HCT VFR BLD AUTO: 43.6 % (ref 37–48.5)
HDLC SERPL-MCNC: 48 MG/DL (ref 40–75)
HDLC SERPL: 27.1 % (ref 20–50)
HGB BLD-MCNC: 14.3 G/DL (ref 12–16)
IMM GRANULOCYTES # BLD AUTO: 0.02 K/UL (ref 0–0.04)
IMM GRANULOCYTES NFR BLD AUTO: 0.3 % (ref 0–0.5)
LDLC SERPL CALC-MCNC: 92.8 MG/DL (ref 63–159)
LYMPHOCYTES # BLD AUTO: 1 K/UL (ref 1–4.8)
LYMPHOCYTES NFR BLD: 12.9 % (ref 18–48)
MCH RBC QN AUTO: 31.1 PG (ref 27–31)
MCHC RBC AUTO-ENTMCNC: 32.8 G/DL (ref 32–36)
MCV RBC AUTO: 95 FL (ref 82–98)
MONOCYTES # BLD AUTO: 0.5 K/UL (ref 0.3–1)
MONOCYTES NFR BLD: 7 % (ref 4–15)
NEUTROPHILS # BLD AUTO: 5.7 K/UL (ref 1.8–7.7)
NEUTROPHILS NFR BLD: 76.5 % (ref 38–73)
NONHDLC SERPL-MCNC: 129 MG/DL
NRBC BLD-RTO: 0 /100 WBC
PLATELET # BLD AUTO: 287 K/UL (ref 150–450)
PMV BLD AUTO: 10.3 FL (ref 9.2–12.9)
POTASSIUM SERPL-SCNC: 4.5 MMOL/L (ref 3.5–5.1)
PROT SERPL-MCNC: 6.9 G/DL (ref 6–8.4)
RBC # BLD AUTO: 4.6 M/UL (ref 4–5.4)
SODIUM SERPL-SCNC: 139 MMOL/L (ref 136–145)
TRIGL SERPL-MCNC: 181 MG/DL (ref 30–150)
TSH SERPL DL<=0.005 MIU/L-ACNC: 1.23 UIU/ML (ref 0.4–4)
WBC # BLD AUTO: 7.45 K/UL (ref 3.9–12.7)

## 2022-01-26 PROCEDURE — 85025 COMPLETE CBC W/AUTO DIFF WBC: CPT | Performed by: INTERNAL MEDICINE

## 2022-01-26 PROCEDURE — 80053 COMPREHEN METABOLIC PANEL: CPT | Performed by: INTERNAL MEDICINE

## 2022-01-26 PROCEDURE — 36415 COLL VENOUS BLD VENIPUNCTURE: CPT | Performed by: INTERNAL MEDICINE

## 2022-01-26 PROCEDURE — 80061 LIPID PANEL: CPT | Performed by: INTERNAL MEDICINE

## 2022-01-26 PROCEDURE — 84443 ASSAY THYROID STIM HORMONE: CPT | Performed by: INTERNAL MEDICINE

## 2022-01-31 ENCOUNTER — OFFICE VISIT (OUTPATIENT)
Dept: INTERNAL MEDICINE | Facility: CLINIC | Age: 62
End: 2022-01-31
Payer: COMMERCIAL

## 2022-01-31 VITALS
BODY MASS INDEX: 35.57 KG/M2 | DIASTOLIC BLOOD PRESSURE: 80 MMHG | RESPIRATION RATE: 16 BRPM | HEART RATE: 75 BPM | WEIGHT: 226.63 LBS | OXYGEN SATURATION: 97 % | SYSTOLIC BLOOD PRESSURE: 116 MMHG | HEIGHT: 67 IN

## 2022-01-31 DIAGNOSIS — Z00.00 PREVENTATIVE HEALTH CARE: Primary | ICD-10-CM

## 2022-01-31 DIAGNOSIS — L29.9 ITCHY SKIN: ICD-10-CM

## 2022-01-31 PROBLEM — Z01.818 PREOP EXAMINATION: Status: RESOLVED | Noted: 2021-07-28 | Resolved: 2022-01-31

## 2022-01-31 PROCEDURE — 1160F RVW MEDS BY RX/DR IN RCRD: CPT | Mod: CPTII,S$GLB,, | Performed by: INTERNAL MEDICINE

## 2022-01-31 PROCEDURE — 1159F MED LIST DOCD IN RCRD: CPT | Mod: CPTII,S$GLB,, | Performed by: INTERNAL MEDICINE

## 2022-01-31 PROCEDURE — 99396 PR PREVENTIVE VISIT,EST,40-64: ICD-10-PCS | Mod: S$GLB,,, | Performed by: INTERNAL MEDICINE

## 2022-01-31 PROCEDURE — 3008F PR BODY MASS INDEX (BMI) DOCUMENTED: ICD-10-PCS | Mod: CPTII,S$GLB,, | Performed by: INTERNAL MEDICINE

## 2022-01-31 PROCEDURE — 1160F PR REVIEW ALL MEDS BY PRESCRIBER/CLIN PHARMACIST DOCUMENTED: ICD-10-PCS | Mod: CPTII,S$GLB,, | Performed by: INTERNAL MEDICINE

## 2022-01-31 PROCEDURE — 3079F DIAST BP 80-89 MM HG: CPT | Mod: CPTII,S$GLB,, | Performed by: INTERNAL MEDICINE

## 2022-01-31 PROCEDURE — 3008F BODY MASS INDEX DOCD: CPT | Mod: CPTII,S$GLB,, | Performed by: INTERNAL MEDICINE

## 2022-01-31 PROCEDURE — 3074F SYST BP LT 130 MM HG: CPT | Mod: CPTII,S$GLB,, | Performed by: INTERNAL MEDICINE

## 2022-01-31 PROCEDURE — 1159F PR MEDICATION LIST DOCUMENTED IN MEDICAL RECORD: ICD-10-PCS | Mod: CPTII,S$GLB,, | Performed by: INTERNAL MEDICINE

## 2022-01-31 PROCEDURE — 3079F PR MOST RECENT DIASTOLIC BLOOD PRESSURE 80-89 MM HG: ICD-10-PCS | Mod: CPTII,S$GLB,, | Performed by: INTERNAL MEDICINE

## 2022-01-31 PROCEDURE — 99999 PR PBB SHADOW E&M-EST. PATIENT-LVL IV: ICD-10-PCS | Mod: PBBFAC,,, | Performed by: INTERNAL MEDICINE

## 2022-01-31 PROCEDURE — 99999 PR PBB SHADOW E&M-EST. PATIENT-LVL IV: CPT | Mod: PBBFAC,,, | Performed by: INTERNAL MEDICINE

## 2022-01-31 PROCEDURE — 3074F PR MOST RECENT SYSTOLIC BLOOD PRESSURE < 130 MM HG: ICD-10-PCS | Mod: CPTII,S$GLB,, | Performed by: INTERNAL MEDICINE

## 2022-01-31 PROCEDURE — 99396 PREV VISIT EST AGE 40-64: CPT | Mod: S$GLB,,, | Performed by: INTERNAL MEDICINE

## 2022-01-31 RX ORDER — FLUOCINONIDE 0.5 MG/G
OINTMENT TOPICAL 2 TIMES DAILY
Qty: 60 G | Refills: 0 | Status: SHIPPED | OUTPATIENT
Start: 2022-01-31 | End: 2022-06-01

## 2022-01-31 NOTE — PROGRESS NOTES
Subjective:       Patient ID: Vanessa Olsen is a 61 y.o. female.    Chief Complaint: Annual Exam    HPI 61-year-old female presents to clinic today for annual physical.  Patient is trying to get back to healthy lifestyle with improving her eating and starting back on weight watchers she is trying to commit to doing some type of regular exercise.  She reports some itchy skin mostly since the cold weather she has requested something stronger than over-the-counter hydrocortisone cream.  She is using moisturizer  Review of Systems    otherwise negative  Objective:      Physical Exam  General: Well-appearing, well-nourished.  No distress  HEENT: conjunctivae are normal.  Pupils are equal and reative to light.  TM's are clear and intact bilaterally.  Hearing is grossly normal.  Nasopharynx is clear.  Oropharynx is clear.  Neck: Supple.  No thyroid megaly.  No bruits.  Lymph: No cervical or supraclavicular adenopathy.  Heart: Regular rate and rhythm, without murmur, rub or gallop.  Lungs: Clear to auscultation; respiratory effort normal.  Abdomen: Soft, nontender, nondistended.  Normoactive bowel sounds.  No hepatomegaly.  No masses.  Extremities: Good distal pulses.  No edema.  Psych: Oriented to time person place.  Judgment and insight seem unimpaired.  Mood and affect are appropriate.  Assessment:       Problem List Items Addressed This Visit    None     Visit Diagnoses     Preventative health care    -  Primary    Itchy skin        Relevant Medications    fluocinonide (LIDEX) 0.05 % ointment          Plan:       Vanessa was seen today for annual exam.    Diagnoses and all orders for this visit:    Preventative health care  Healthcare maintenance performed, reviewed, updated and counseled today.  Itchy skin  -     fluocinonide (LIDEX) 0.05 % ointment; Apply topically 2 (two) times daily.  Discuss use benefit as well as potential adverse side effects recommend short-term usage.

## 2022-03-23 ENCOUNTER — CLINICAL SUPPORT (OUTPATIENT)
Dept: OTHER | Facility: CLINIC | Age: 62
End: 2022-03-23
Payer: COMMERCIAL

## 2022-03-23 DIAGNOSIS — Z00.8 ENCOUNTER FOR OTHER GENERAL EXAMINATION: ICD-10-CM

## 2022-03-24 VITALS — BODY MASS INDEX: 34.46 KG/M2 | HEIGHT: 68 IN

## 2022-03-24 LAB
GLUCOSE SERPL-MCNC: 97 MG/DL (ref 60–140)
HDLC SERPL-MCNC: 59 MG/DL
POC CHOLESTEROL, LDL (DOCK): 91.2 MG/DL
POC CHOLESTEROL, TOTAL: 198 MG/DL
TRIGL SERPL-MCNC: 239 MG/DL

## 2022-04-23 DIAGNOSIS — H04.123 BILATERAL DRY EYES: ICD-10-CM

## 2022-04-25 RX ORDER — CYCLOSPORINE 0.5 MG/ML
EMULSION OPHTHALMIC
Qty: 180 EACH | Refills: 3 | Status: SHIPPED | OUTPATIENT
Start: 2022-04-25 | End: 2023-07-14 | Stop reason: SDUPTHER

## 2022-06-01 ENCOUNTER — PATIENT MESSAGE (OUTPATIENT)
Dept: INTERNAL MEDICINE | Facility: CLINIC | Age: 62
End: 2022-06-01
Payer: COMMERCIAL

## 2022-06-01 RX ORDER — FLUOCINONIDE 0.5 MG/G
CREAM TOPICAL 2 TIMES DAILY
Qty: 60 G | Refills: 1 | Status: SHIPPED | OUTPATIENT
Start: 2022-06-01 | End: 2023-11-13

## 2022-06-09 ENCOUNTER — IMMUNIZATION (OUTPATIENT)
Dept: INTERNAL MEDICINE | Facility: CLINIC | Age: 62
End: 2022-06-09
Payer: COMMERCIAL

## 2022-06-09 DIAGNOSIS — Z23 NEED FOR VACCINATION: Primary | ICD-10-CM

## 2022-06-09 PROCEDURE — 91305 COVID-19, MRNA, LNP-S, PF, 30 MCG/0.3 ML DOSE VACCINE (PFIZER): CPT | Mod: PBBFAC | Performed by: FAMILY MEDICINE

## 2022-08-01 ENCOUNTER — TELEPHONE (OUTPATIENT)
Dept: OBSTETRICS AND GYNECOLOGY | Facility: CLINIC | Age: 62
End: 2022-08-01
Payer: COMMERCIAL

## 2022-08-01 NOTE — TELEPHONE ENCOUNTER
----- Message from Laith Castellanos sent at 7/27/2022  2:37 PM CDT -----  Name Of Caller:Vanessa            Provider Name:Isaura Cassidy            Does patient feel the need to be seen today?No            Relationship to the Pt?:Patient            Contact Preference?:[phone]            What is the nature of the call?: Patient would like to schedule an appt but no availabilities.

## 2022-08-16 ENCOUNTER — PATIENT MESSAGE (OUTPATIENT)
Dept: OBSTETRICS AND GYNECOLOGY | Facility: CLINIC | Age: 62
End: 2022-08-16
Payer: COMMERCIAL

## 2022-08-16 DIAGNOSIS — Z12.31 BREAST CANCER SCREENING BY MAMMOGRAM: Primary | ICD-10-CM

## 2022-09-21 ENCOUNTER — HOSPITAL ENCOUNTER (OUTPATIENT)
Dept: RADIOLOGY | Facility: HOSPITAL | Age: 62
Discharge: HOME | End: 2022-09-21
Attending: OBSTETRICS & GYNECOLOGY
Payer: COMMERCIAL

## 2022-09-21 DIAGNOSIS — Z12.31 BREAST CANCER SCREENING BY MAMMOGRAM: ICD-10-CM

## 2022-09-21 PROCEDURE — 77063 BREAST TOMOSYNTHESIS BI: CPT | Mod: TC

## 2022-09-21 PROCEDURE — 77063 BREAST TOMOSYNTHESIS BI: CPT | Mod: 26,,, | Performed by: RADIOLOGY

## 2022-09-21 PROCEDURE — 77067 MAMMO DIGITAL SCREENING BILAT WITH TOMO: ICD-10-PCS | Mod: 26,,, | Performed by: RADIOLOGY

## 2022-09-21 PROCEDURE — 77067 SCR MAMMO BI INCL CAD: CPT | Mod: 26,,, | Performed by: RADIOLOGY

## 2022-09-21 PROCEDURE — 77063 MAMMO DIGITAL SCREENING BILAT WITH TOMO: ICD-10-PCS | Mod: 26,,, | Performed by: RADIOLOGY

## 2022-10-10 ENCOUNTER — PATIENT MESSAGE (OUTPATIENT)
Dept: OPTOMETRY | Facility: CLINIC | Age: 62
End: 2022-10-10
Payer: COMMERCIAL

## 2022-10-10 DIAGNOSIS — H04.123 BILATERAL DRY EYES: Primary | ICD-10-CM

## 2022-10-10 RX ORDER — FLUOROMETHOLONE 1 MG/ML
1 SUSPENSION/ DROPS OPHTHALMIC 4 TIMES DAILY
Qty: 5 ML | Refills: 0 | Status: SHIPPED | OUTPATIENT
Start: 2022-10-10 | End: 2023-04-12 | Stop reason: SDUPTHER

## 2022-10-10 NOTE — PROGRESS NOTES
Assessment /Plan     For exam results, see Encounter Report.    Bilateral dry eyes  -     fluorometholone 0.1% (FML) 0.1 % DrpS; Place 1 drop into both eyes 4 (four) times daily. for 10 days  Dispense: 5 mL; Refill: 0      Sent steroid drop for dry eyes per pt request

## 2022-10-20 ENCOUNTER — IMMUNIZATION (OUTPATIENT)
Dept: INTERNAL MEDICINE | Facility: CLINIC | Age: 62
End: 2022-10-20
Payer: COMMERCIAL

## 2022-10-20 DIAGNOSIS — Z23 NEED FOR VACCINATION: Primary | ICD-10-CM

## 2022-10-20 PROCEDURE — 91312 COVID-19, MRNA, LNP-S, BIVALENT BOOSTER, PF, 30 MCG/0.3 ML DOSE: ICD-10-PCS | Mod: S$GLB,,, | Performed by: FAMILY MEDICINE

## 2022-10-20 PROCEDURE — 0124A COVID-19, MRNA, LNP-S, BIVALENT BOOSTER, PF, 30 MCG/0.3 ML DOSE: CPT | Mod: PBBFAC | Performed by: FAMILY MEDICINE

## 2022-10-20 PROCEDURE — 91312 COVID-19, MRNA, LNP-S, BIVALENT BOOSTER, PF, 30 MCG/0.3 ML DOSE: CPT | Mod: S$GLB,,, | Performed by: FAMILY MEDICINE

## 2022-12-02 ENCOUNTER — OFFICE VISIT (OUTPATIENT)
Dept: OPTOMETRY | Facility: CLINIC | Age: 62
End: 2022-12-02
Payer: COMMERCIAL

## 2022-12-02 DIAGNOSIS — H04.123 BILATERAL DRY EYES: ICD-10-CM

## 2022-12-02 DIAGNOSIS — H52.4 HYPEROPIA OF BOTH EYES WITH ASTIGMATISM AND PRESBYOPIA: Primary | ICD-10-CM

## 2022-12-02 DIAGNOSIS — H25.13 NUCLEAR SCLEROSIS OF BOTH EYES: ICD-10-CM

## 2022-12-02 DIAGNOSIS — H52.203 HYPEROPIA OF BOTH EYES WITH ASTIGMATISM AND PRESBYOPIA: Primary | ICD-10-CM

## 2022-12-02 DIAGNOSIS — H52.03 HYPEROPIA OF BOTH EYES WITH ASTIGMATISM AND PRESBYOPIA: Primary | ICD-10-CM

## 2022-12-02 PROCEDURE — 92014 PR EYE EXAM, EST PATIENT,COMPREHESV: ICD-10-PCS | Mod: S$GLB,,, | Performed by: OPTOMETRIST

## 2022-12-02 PROCEDURE — 99999 PR PBB SHADOW E&M-EST. PATIENT-LVL III: ICD-10-PCS | Mod: PBBFAC,,, | Performed by: OPTOMETRIST

## 2022-12-02 PROCEDURE — 92015 DETERMINE REFRACTIVE STATE: CPT | Mod: S$GLB,,, | Performed by: OPTOMETRIST

## 2022-12-02 PROCEDURE — 99999 PR PBB SHADOW E&M-EST. PATIENT-LVL III: CPT | Mod: PBBFAC,,, | Performed by: OPTOMETRIST

## 2022-12-02 PROCEDURE — 92014 COMPRE OPH EXAM EST PT 1/>: CPT | Mod: S$GLB,,, | Performed by: OPTOMETRIST

## 2022-12-02 PROCEDURE — 92015 PR REFRACTION: ICD-10-PCS | Mod: S$GLB,,, | Performed by: OPTOMETRIST

## 2022-12-02 NOTE — PROGRESS NOTES
HPI    Dle- 12/14/2021    Pt here for routine eye exam. Pt sts changes to distance va, trouble   reading tv guide on tv. Denies flashes/floaters/eye pain. Gtts: restasis   BID OU, otc systane prn  Last edited by Courtney Mccoy MA on 12/2/2022  9:25 AM.            Assessment /Plan     For exam results, see Encounter Report.    Hyperopia of both eyes with astigmatism and presbyopia    Nuclear sclerosis of both eyes    Bilateral dry eyes    2. Educated pt on presence of cataracts and effects on vision. No surgery at this time. Recheck in one year.   3. Continue Restasis bid ou, well controlled, RTc or call for steroid drops for flare ups.  1. Spectacle Rx given, discussed different options for glasses. RTC 1 year routine eye exam.

## 2023-01-17 ENCOUNTER — PATIENT MESSAGE (OUTPATIENT)
Dept: INTERNAL MEDICINE | Facility: CLINIC | Age: 63
End: 2023-01-17
Payer: COMMERCIAL

## 2023-01-17 DIAGNOSIS — Z78.0 MENOPAUSE: ICD-10-CM

## 2023-01-18 RX ORDER — ESTRADIOL 2 MG/1
2 TABLET ORAL DAILY
Qty: 90 TABLET | Refills: 3 | Status: SHIPPED | OUTPATIENT
Start: 2023-01-18 | End: 2023-11-14

## 2023-01-27 ENCOUNTER — LAB VISIT (OUTPATIENT)
Dept: LAB | Facility: HOSPITAL | Age: 63
End: 2023-01-27
Attending: INTERNAL MEDICINE
Payer: COMMERCIAL

## 2023-01-27 DIAGNOSIS — Z00.00 PREVENTATIVE HEALTH CARE: ICD-10-CM

## 2023-01-27 LAB
ALBUMIN SERPL BCP-MCNC: 3.6 G/DL (ref 3.5–5.2)
ALP SERPL-CCNC: 73 U/L (ref 55–135)
ALT SERPL W/O P-5'-P-CCNC: 13 U/L (ref 10–44)
ANION GAP SERPL CALC-SCNC: 9 MMOL/L (ref 8–16)
AST SERPL-CCNC: 17 U/L (ref 10–40)
BASOPHILS # BLD AUTO: 0.03 K/UL (ref 0–0.2)
BASOPHILS NFR BLD: 0.4 % (ref 0–1.9)
BILIRUB SERPL-MCNC: 0.7 MG/DL (ref 0.1–1)
BUN SERPL-MCNC: 19 MG/DL (ref 8–23)
CALCIUM SERPL-MCNC: 9.6 MG/DL (ref 8.7–10.5)
CHLORIDE SERPL-SCNC: 107 MMOL/L (ref 95–110)
CHOLEST SERPL-MCNC: 196 MG/DL (ref 120–199)
CHOLEST/HDLC SERPL: 3.4 {RATIO} (ref 2–5)
CO2 SERPL-SCNC: 24 MMOL/L (ref 23–29)
CREAT SERPL-MCNC: 0.9 MG/DL (ref 0.5–1.4)
DIFFERENTIAL METHOD: ABNORMAL
EOSINOPHIL # BLD AUTO: 0.3 K/UL (ref 0–0.5)
EOSINOPHIL NFR BLD: 3.7 % (ref 0–8)
ERYTHROCYTE [DISTWIDTH] IN BLOOD BY AUTOMATED COUNT: 11.9 % (ref 11.5–14.5)
EST. GFR  (NO RACE VARIABLE): >60 ML/MIN/1.73 M^2
GLUCOSE SERPL-MCNC: 93 MG/DL (ref 70–110)
HCT VFR BLD AUTO: 45.1 % (ref 37–48.5)
HDLC SERPL-MCNC: 57 MG/DL (ref 40–75)
HDLC SERPL: 29.1 % (ref 20–50)
HGB BLD-MCNC: 14.6 G/DL (ref 12–16)
IMM GRANULOCYTES # BLD AUTO: 0.03 K/UL (ref 0–0.04)
IMM GRANULOCYTES NFR BLD AUTO: 0.4 % (ref 0–0.5)
LDLC SERPL CALC-MCNC: 111.2 MG/DL (ref 63–159)
LYMPHOCYTES # BLD AUTO: 1.2 K/UL (ref 1–4.8)
LYMPHOCYTES NFR BLD: 15.4 % (ref 18–48)
MCH RBC QN AUTO: 30.9 PG (ref 27–31)
MCHC RBC AUTO-ENTMCNC: 32.4 G/DL (ref 32–36)
MCV RBC AUTO: 96 FL (ref 82–98)
MONOCYTES # BLD AUTO: 0.6 K/UL (ref 0.3–1)
MONOCYTES NFR BLD: 8 % (ref 4–15)
NEUTROPHILS # BLD AUTO: 5.7 K/UL (ref 1.8–7.7)
NEUTROPHILS NFR BLD: 72.1 % (ref 38–73)
NONHDLC SERPL-MCNC: 139 MG/DL
NRBC BLD-RTO: 0 /100 WBC
PLATELET # BLD AUTO: 300 K/UL (ref 150–450)
PMV BLD AUTO: 10.3 FL (ref 9.2–12.9)
POTASSIUM SERPL-SCNC: 4.9 MMOL/L (ref 3.5–5.1)
PROT SERPL-MCNC: 7.1 G/DL (ref 6–8.4)
RBC # BLD AUTO: 4.72 M/UL (ref 4–5.4)
SODIUM SERPL-SCNC: 140 MMOL/L (ref 136–145)
TRIGL SERPL-MCNC: 139 MG/DL (ref 30–150)
TSH SERPL DL<=0.005 MIU/L-ACNC: 1.09 UIU/ML (ref 0.4–4)
WBC # BLD AUTO: 7.9 K/UL (ref 3.9–12.7)

## 2023-01-27 PROCEDURE — 85025 COMPLETE CBC W/AUTO DIFF WBC: CPT | Performed by: INTERNAL MEDICINE

## 2023-01-27 PROCEDURE — 80061 LIPID PANEL: CPT | Performed by: INTERNAL MEDICINE

## 2023-01-27 PROCEDURE — 80053 COMPREHEN METABOLIC PANEL: CPT | Performed by: INTERNAL MEDICINE

## 2023-01-27 PROCEDURE — 84443 ASSAY THYROID STIM HORMONE: CPT | Performed by: INTERNAL MEDICINE

## 2023-01-27 PROCEDURE — 36415 COLL VENOUS BLD VENIPUNCTURE: CPT | Performed by: INTERNAL MEDICINE

## 2023-01-30 ENCOUNTER — OFFICE VISIT (OUTPATIENT)
Dept: INTERNAL MEDICINE | Facility: CLINIC | Age: 63
End: 2023-01-30
Payer: COMMERCIAL

## 2023-01-30 VITALS
WEIGHT: 237 LBS | BODY MASS INDEX: 36.03 KG/M2 | OXYGEN SATURATION: 97 % | SYSTOLIC BLOOD PRESSURE: 118 MMHG | DIASTOLIC BLOOD PRESSURE: 82 MMHG | HEART RATE: 70 BPM

## 2023-01-30 DIAGNOSIS — Z00.00 PREVENTATIVE HEALTH CARE: Primary | ICD-10-CM

## 2023-01-30 DIAGNOSIS — I87.2 VENOUS INSUFFICIENCY: ICD-10-CM

## 2023-01-30 DIAGNOSIS — Z86.010 HISTORY OF COLON POLYPS: ICD-10-CM

## 2023-01-30 PROCEDURE — 1159F PR MEDICATION LIST DOCUMENTED IN MEDICAL RECORD: ICD-10-PCS | Mod: CPTII,S$GLB,, | Performed by: INTERNAL MEDICINE

## 2023-01-30 PROCEDURE — 99999 PR PBB SHADOW E&M-EST. PATIENT-LVL IV: ICD-10-PCS | Mod: PBBFAC,,, | Performed by: INTERNAL MEDICINE

## 2023-01-30 PROCEDURE — 99396 PREV VISIT EST AGE 40-64: CPT | Mod: S$GLB,,, | Performed by: INTERNAL MEDICINE

## 2023-01-30 PROCEDURE — 3079F DIAST BP 80-89 MM HG: CPT | Mod: CPTII,S$GLB,, | Performed by: INTERNAL MEDICINE

## 2023-01-30 PROCEDURE — 3074F SYST BP LT 130 MM HG: CPT | Mod: CPTII,S$GLB,, | Performed by: INTERNAL MEDICINE

## 2023-01-30 PROCEDURE — 1160F PR REVIEW ALL MEDS BY PRESCRIBER/CLIN PHARMACIST DOCUMENTED: ICD-10-PCS | Mod: CPTII,S$GLB,, | Performed by: INTERNAL MEDICINE

## 2023-01-30 PROCEDURE — 1159F MED LIST DOCD IN RCRD: CPT | Mod: CPTII,S$GLB,, | Performed by: INTERNAL MEDICINE

## 2023-01-30 PROCEDURE — 3008F BODY MASS INDEX DOCD: CPT | Mod: CPTII,S$GLB,, | Performed by: INTERNAL MEDICINE

## 2023-01-30 PROCEDURE — 1160F RVW MEDS BY RX/DR IN RCRD: CPT | Mod: CPTII,S$GLB,, | Performed by: INTERNAL MEDICINE

## 2023-01-30 PROCEDURE — 3074F PR MOST RECENT SYSTOLIC BLOOD PRESSURE < 130 MM HG: ICD-10-PCS | Mod: CPTII,S$GLB,, | Performed by: INTERNAL MEDICINE

## 2023-01-30 PROCEDURE — 99396 PR PREVENTIVE VISIT,EST,40-64: ICD-10-PCS | Mod: S$GLB,,, | Performed by: INTERNAL MEDICINE

## 2023-01-30 PROCEDURE — 3008F PR BODY MASS INDEX (BMI) DOCUMENTED: ICD-10-PCS | Mod: CPTII,S$GLB,, | Performed by: INTERNAL MEDICINE

## 2023-01-30 PROCEDURE — 99999 PR PBB SHADOW E&M-EST. PATIENT-LVL IV: CPT | Mod: PBBFAC,,, | Performed by: INTERNAL MEDICINE

## 2023-01-30 PROCEDURE — 3079F PR MOST RECENT DIASTOLIC BLOOD PRESSURE 80-89 MM HG: ICD-10-PCS | Mod: CPTII,S$GLB,, | Performed by: INTERNAL MEDICINE

## 2023-01-30 NOTE — PROGRESS NOTES
Subjective:       Patient ID: Vanessa Olsen is a 62 y.o. female.    Chief Complaint: Annual Exam and Foot Swelling    HPI 62-year-old female presents to clinic today for annual physical patient reports that occasionally she is noted some swelling in her feet with some occasional discomfort and numbness she does report that when she went on a vacation she had a rash on her lower ankles she states when Ray in 2 days and she did put some topical hydrocortisone on it.  Review of Systems    Otherwise negative  Objective:      Physical Exam  General: Well-appearing, well-nourished.  No distress  HEENT: conjunctivae are normal.  Pupils are equal and reative to light.  TM's are clear and intact bilaterally.  Hearing is grossly normal.  Nasopharynx is clear.  Oropharynx is clear.  Neck: Supple.  No thyroid megaly.  No bruits.  Lymph: No cervical or supraclavicular adenopathy.  Heart: Regular rate and rhythm, without murmur, rub or gallop.  Lungs: Clear to auscultation; respiratory effort normal.  Abdomen: Soft, nontender, nondistended.  Normoactive bowel sounds.  No hepatomegaly.  No masses.  Extremities: Good distal pulses.  No edema.  Psych: Oriented to time person place.  Judgment and insight seem unimpaired.  Mood and affect are appropriate.  Assessment:       Problem List Items Addressed This Visit    None  Visit Diagnoses       Preventative health care    -  Primary    Venous insufficiency        Relevant Orders    COMPRESSION STOCKINGS    History of colon polyps        Relevant Orders    Case Request Endoscopy: COLONOSCOPY (Completed)              Plan:       Vanessa was seen today for annual exam and foot swelling.    Diagnoses and all orders for this visit:    Preventative health care  Healthcare maintenance performed, reviewed, updated and counseled today.  Venous insufficiency  -     COMPRESSION STOCKINGS  Recommended trial of compression stockings water elevation and good hydration and low-sodium diet if  symptoms are persistent refractory she is to contact me for further workup.  History of colon polyps  -     Case Request Endoscopy: COLONOSCOPY

## 2023-02-09 DIAGNOSIS — Z12.11 ENCOUNTER FOR COLONOSCOPY DUE TO HISTORY OF COLONIC POLYP: Primary | ICD-10-CM

## 2023-02-09 DIAGNOSIS — Z86.010 ENCOUNTER FOR COLONOSCOPY DUE TO HISTORY OF COLONIC POLYP: Primary | ICD-10-CM

## 2023-02-13 ENCOUNTER — CLINICAL SUPPORT (OUTPATIENT)
Dept: ENDOSCOPY | Facility: HOSPITAL | Age: 63
End: 2023-02-13
Attending: INTERNAL MEDICINE
Payer: COMMERCIAL

## 2023-02-13 VITALS — WEIGHT: 230 LBS | BODY MASS INDEX: 34.86 KG/M2 | HEIGHT: 68 IN

## 2023-02-13 DIAGNOSIS — Z86.010 ENCOUNTER FOR COLONOSCOPY DUE TO HISTORY OF COLONIC POLYP: ICD-10-CM

## 2023-02-13 DIAGNOSIS — Z12.11 SPECIAL SCREENING FOR MALIGNANT NEOPLASMS, COLON: Primary | ICD-10-CM

## 2023-02-13 DIAGNOSIS — Z12.11 ENCOUNTER FOR COLONOSCOPY DUE TO HISTORY OF COLONIC POLYP: ICD-10-CM

## 2023-02-13 RX ORDER — SODIUM, POTASSIUM,MAG SULFATES 17.5-3.13G
1 SOLUTION, RECONSTITUTED, ORAL ORAL DAILY
Qty: 1 KIT | Refills: 0 | Status: SHIPPED | OUTPATIENT
Start: 2023-02-13 | End: 2023-02-17

## 2023-02-14 ENCOUNTER — TELEPHONE (OUTPATIENT)
Dept: ENDOSCOPY | Facility: HOSPITAL | Age: 63
End: 2023-02-14
Payer: COMMERCIAL

## 2023-02-24 ENCOUNTER — PATIENT MESSAGE (OUTPATIENT)
Dept: INTERNAL MEDICINE | Facility: CLINIC | Age: 63
End: 2023-02-24
Payer: COMMERCIAL

## 2023-02-24 DIAGNOSIS — Z78.0 MENOPAUSE: ICD-10-CM

## 2023-02-24 RX ORDER — PROGESTERONE 200 MG/1
CAPSULE ORAL
Qty: 90 CAPSULE | Refills: 0 | Status: SHIPPED | OUTPATIENT
Start: 2023-02-24 | End: 2023-05-23 | Stop reason: SDUPTHER

## 2023-02-24 NOTE — TELEPHONE ENCOUNTER
Refill Routing Note   Medication(s) are not appropriate for processing by [facility] for the following reason(s):           [facility] action(s):  Route         Medication reconciliation completed: No   Extended chart review required: No  Alert overridden per protocol: No            Appointments  past 12m or future 3m with PCP    Date Provider   Last Visit   1/30/2023 Yessi Odom MD   Next Visit   Visit date not found Yessi Odom MD   ED visits in past 90 days: 0        Note composed:10:04 AM 02/24/2023

## 2023-03-07 ENCOUNTER — CLINICAL SUPPORT (OUTPATIENT)
Dept: OTHER | Facility: CLINIC | Age: 63
End: 2023-03-07
Payer: COMMERCIAL

## 2023-03-07 DIAGNOSIS — Z00.8 ENCOUNTER FOR OTHER GENERAL EXAMINATION: ICD-10-CM

## 2023-03-08 VITALS
HEIGHT: 68 IN | WEIGHT: 235 LBS | DIASTOLIC BLOOD PRESSURE: 68 MMHG | SYSTOLIC BLOOD PRESSURE: 149 MMHG | BODY MASS INDEX: 35.61 KG/M2

## 2023-03-08 LAB
GLUCOSE SERPL-MCNC: 94 MG/DL (ref 60–140)
HDLC SERPL-MCNC: 62 MG/DL
POC CHOLESTEROL, LDL (DOCK): 84 MG/DL
POC CHOLESTEROL, TOTAL: 180 MG/DL
TRIGL SERPL-MCNC: 202 MG/DL

## 2023-03-27 ENCOUNTER — ANESTHESIA EVENT (OUTPATIENT)
Dept: ENDOSCOPY | Facility: HOSPITAL | Age: 63
End: 2023-03-27
Payer: COMMERCIAL

## 2023-03-27 ENCOUNTER — HOSPITAL ENCOUNTER (OUTPATIENT)
Facility: HOSPITAL | Age: 63
Discharge: HOME | End: 2023-03-27
Attending: INTERNAL MEDICINE | Admitting: INTERNAL MEDICINE
Payer: COMMERCIAL

## 2023-03-27 ENCOUNTER — ANESTHESIA (OUTPATIENT)
Dept: ENDOSCOPY | Facility: HOSPITAL | Age: 63
End: 2023-03-27
Payer: COMMERCIAL

## 2023-03-27 VITALS
RESPIRATION RATE: 18 BRPM | DIASTOLIC BLOOD PRESSURE: 60 MMHG | HEIGHT: 68 IN | WEIGHT: 229 LBS | TEMPERATURE: 98 F | OXYGEN SATURATION: 99 % | SYSTOLIC BLOOD PRESSURE: 133 MMHG | HEART RATE: 62 BPM | BODY MASS INDEX: 34.71 KG/M2

## 2023-03-27 DIAGNOSIS — D12.6 COLON ADENOMAS: ICD-10-CM

## 2023-03-27 PROCEDURE — 37000009 HC ANESTHESIA EA ADD 15 MINS: Performed by: INTERNAL MEDICINE

## 2023-03-27 PROCEDURE — 63600175 PHARM REV CODE 636 W HCPCS: Performed by: NURSE ANESTHETIST, CERTIFIED REGISTERED

## 2023-03-27 PROCEDURE — E9220 PRA ENDO ANESTHESIA: ICD-10-PCS | Mod: ,,, | Performed by: NURSE ANESTHETIST, CERTIFIED REGISTERED

## 2023-03-27 PROCEDURE — E9220 PRA ENDO ANESTHESIA: HCPCS | Mod: ,,, | Performed by: NURSE ANESTHETIST, CERTIFIED REGISTERED

## 2023-03-27 PROCEDURE — 25000003 PHARM REV CODE 250: Performed by: NURSE ANESTHETIST, CERTIFIED REGISTERED

## 2023-03-27 PROCEDURE — G0105 COLORECTAL SCRN; HI RISK IND: HCPCS | Performed by: INTERNAL MEDICINE

## 2023-03-27 PROCEDURE — G0105 COLORECTAL SCRN; HI RISK IND: HCPCS | Mod: ,,, | Performed by: INTERNAL MEDICINE

## 2023-03-27 PROCEDURE — 37000008 HC ANESTHESIA 1ST 15 MINUTES: Performed by: INTERNAL MEDICINE

## 2023-03-27 PROCEDURE — G0105 COLORECTAL SCRN; HI RISK IND: ICD-10-PCS | Mod: ,,, | Performed by: INTERNAL MEDICINE

## 2023-03-27 RX ORDER — SODIUM CHLORIDE 9 MG/ML
INJECTION, SOLUTION INTRAVENOUS CONTINUOUS
Status: DISCONTINUED | OUTPATIENT
Start: 2023-03-27 | End: 2023-03-27 | Stop reason: HOSPADM

## 2023-03-27 RX ORDER — PROPOFOL 10 MG/ML
VIAL (ML) INTRAVENOUS
Status: DISCONTINUED | OUTPATIENT
Start: 2023-03-27 | End: 2023-03-27

## 2023-03-27 RX ORDER — PROPOFOL 10 MG/ML
VIAL (ML) INTRAVENOUS CONTINUOUS PRN
Status: DISCONTINUED | OUTPATIENT
Start: 2023-03-27 | End: 2023-03-27

## 2023-03-27 RX ORDER — LIDOCAINE HYDROCHLORIDE 20 MG/ML
INJECTION, SOLUTION EPIDURAL; INFILTRATION; INTRACAUDAL; PERINEURAL
Status: DISCONTINUED | OUTPATIENT
Start: 2023-03-27 | End: 2023-03-27

## 2023-03-27 RX ADMIN — SODIUM CHLORIDE: 9 INJECTION, SOLUTION INTRAVENOUS at 01:03

## 2023-03-27 RX ADMIN — PROPOFOL 100 MG: 10 INJECTION, EMULSION INTRAVENOUS at 01:03

## 2023-03-27 RX ADMIN — Medication 150 MCG/KG/MIN: at 01:03

## 2023-03-27 RX ADMIN — LIDOCAINE HYDROCHLORIDE 50 MG: 20 INJECTION, SOLUTION EPIDURAL; INFILTRATION; INTRACAUDAL; PERINEURAL at 01:03

## 2023-03-27 NOTE — PLAN OF CARE
Patient remains in recovery area not appearing in distress, pt on the bedside monitor and VSS. Discharge planning and Provation report sheet to be discussed with the patient. Family member notified and waiting in the lobby. Pt denies any complaints at this time. All pt belongings at the bedside.

## 2023-03-27 NOTE — TRANSFER OF CARE
"Anesthesia Transfer of Care Note    Patient: Vanessa LOPEZ Ponthieux    Procedure(s) Performed: Procedure(s) (LRB):  COLONOSCOPY (N/A)    Patient location: PACU    Anesthesia Type: general    Transport from OR: Transported from OR on room air with adequate spontaneous ventilation    Post pain: adequate analgesia    Post assessment: no apparent anesthetic complications    Post vital signs: stable    Level of consciousness: awake    Nausea/Vomiting: no nausea/vomiting    Complications: none    Transfer of care protocol was followed      Last vitals:   Visit Vitals  BP (!) 117/58 (BP Location: Left arm, Patient Position: Lying)   Pulse 71   Temp 36.6 °C (97.9 °F) (Tympanic)   Resp 14   Ht 5' 8" (1.727 m)   Wt 103.9 kg (229 lb)   LMP 01/01/2013   SpO2 99%   Breastfeeding No   BMI 34.82 kg/m²     "

## 2023-03-27 NOTE — ANESTHESIA PREPROCEDURE EVALUATION
03/27/2023  Vanessa Olsen is a 62 y.o., female.  Past Medical History:   Diagnosis Date    Allergy     Breast disorder 2000    Left Breast Bx - Benign     Cataract     Fracture, ribs     H/O cold sores     History of robot-assisted laparoscopic hysterectomy 07/28/2021    Hormone replacement therapy (HRT)     Menopause 2013    PONV (postoperative nausea and vomiting)      Past Surgical History:   Procedure Laterality Date    BREAST BIOPSY Left 2000    benign  (11:00  o'clock ?? )     CHOLECYSTECTOMY  2004    COLONOSCOPY N/A 3/5/2018    Procedure: COLONOSCOPY;  Surgeon: Francisco Du MD;  Location: [address];  Service: Endoscopy;  Laterality: N/A;  3 year f/u - history of colon polyps - ER    CYSTOSCOPY N/A 7/28/2021    Procedure: CYSTOSCOPY;  Surgeon: Isaura Cassidy MD;  Location: [facility];  Service: OB/GYN;  Laterality: N/A;    HYSTERECTOMY  07/28/2021    HYSTEROSCOPY N/A 4/6/2021    Procedure: HYSTEROSCOPY;  Surgeon: Isaura Cassidy MD;  Location: [facility];  Service: OB/GYN;  Laterality: N/A;    ROBOT-ASSISTED LAPAROSCOPIC ABDOMINAL HYSTERECTOMY USING DA VANESSA XI Bilateral 7/28/2021    Procedure: XI ROBOTIC HYSTERECTOMY;  Surgeon: Isaura Cassidy MD;  Location: [facility];  Service: OB/GYN;  Laterality: Bilateral;  BSO    ROBOT-ASSISTED LAPAROSCOPIC SALPINGO-OOPHORECTOMY Bilateral 7/28/2021    Procedure: ROBOTIC SALPINGO-OOPHORECTOMY;  Surgeon: Isaura Cassidy MD;  Location: [facility];  Service: OB/GYN;  Laterality: Bilateral;    WISDOM TOOTH EXTRACTION       Patient Active Problem List   Diagnosis    Bilateral dry eyes - Both Eyes    Family history of colonic polyps    History of adenomatous polyp of colon    Seasonal allergies         Pre-op Assessment    I have reviewed the Patient Summary Reports.     I have reviewed the Nursing Notes. I have reviewed the  NPO Status.   I have reviewed the Medications.     Review of Systems  Anesthesia Hx:  No problems with previous Anesthesia    Social:  Alcohol Use, Non-Smoker        Physical Exam  General: Alert and Oriented    Airway:  Mallampati: I   Mouth Opening: Normal  TM Distance: Normal  Tongue: Normal  Neck ROM: Normal ROM    Dental:  Intact        Anesthesia Plan  Type of Anesthesia, risks & benefits discussed:    Anesthesia Type: Gen Natural Airway  Intra-op Monitoring Plan: Standard ASA Monitors  Induction:  IV  Airway Plan: Direct  Informed Consent: Informed consent signed with the Patient and all parties understand the risks and agree with anesthesia plan.  All questions answered.   ASA Score: 2    Ready For Surgery From Anesthesia Perspective.     .

## 2023-03-27 NOTE — PROVATION PATIENT INSTRUCTIONS
Discharge Summary/Instructions after an Endoscopic Procedure  Patient Name: Vanessa Olsen  Patient MRN: [id number]  Patient YOB: 1960 Monday, March 27, 2023  Francisco Du MD  Dear patient,  As a result of recent federal legislation (The Federal Cures Act), you may   receive lab or pathology results from your procedure in your [facility]   account before your physician is able to contact you. Your physician or   their representative will relay the results to you with their   recommendations at their soonest availability.  Thank you,  RESTRICTIONS:  During your procedure today, you received medications for sedation.  These   medications may affect your judgment, balance and coordination.  Therefore,   for 24 hours, you have the following restrictions:   - DO NOT drive a car, operate machinery, make legal/financial decisions,   sign important papers or drink alcohol.    ACTIVITY:  Today: no heavy lifting, straining or running due to procedural   sedation/anesthesia.  The following day: return to full activity including work.  DIET:  Eat and drink normally unless instructed otherwise.     TREATMENT FOR COMMON SIDE EFFECTS:  - Mild abdominal pain, nausea, belching, bloating or excessive gas:  rest,   eat lightly and use a heating pad.  - Sore Throat: treat with throat lozenges and/or gargle with warm salt   water.  - Because air was used during the procedure, expelling large amounts of air   from your rectum or belching is normal.  - If a bowel prep was taken, you may not have a bowel movement for 1-3 days.    This is normal.  SYMPTOMS TO WATCH FOR AND REPORT TO YOUR PHYSICIAN:  1. Abdominal pain or bloating, other than gas cramps.  2. Chest pain.  3. Back pain.  4. Signs of infection such as: chills or fever occurring within 24 hours   after the procedure.  5. Rectal bleeding, which would show as bright red, maroon, or black stools.   (A tablespoon of blood from the rectum is not serious, especially  if   hemorrhoids are present.)  6. Vomiting.  7. Weakness or dizziness.  GO DIRECTLY TO THE NEAREST EMERGENCY ROOM IF YOU HAVE ANY OF THE FOLLOWING:      Difficulty breathing              Chills and/or fever over 101 F   Persistent vomiting and/or vomiting blood   Severe abdominal pain   Severe chest pain   Black, tarry stools   Bleeding- more than one tablespoon   Any other symptom or condition that you feel may need urgent attention  Your doctor recommends these additional instructions:  If any biopsies were taken, your doctors clinic will contact you in 1 to 2   weeks with any results.  - Discharge patient to home.   - Repeat colonoscopy in 5 years for surveillance.   - Return to referring physician.   - The findings and recommendations were discussed with the patient.  For questions, problems or results please call your physician - Francisco Du MD at Work:  [phone].  [facility], EMERGENCY ROOM PHONE NUMBER: [phone]  IF A COMPLICATION OR EMERGENCY SITUATION ARISES AND YOU ARE UNABLE TO REACH   YOUR PHYSICIAN - GO DIRECTLY TO THE EMERGENCY ROOM.  Francisco Du MD  3/27/2023 1:46:27 PM  This report has been verified and signed electronically.  Dear patient,  As a result of recent federal legislation (The Federal Cures Act), you may   receive lab or pathology results from your procedure in your [facility]   account before your physician is able to contact you. Your physician or   their representative will relay the results to you with their   recommendations at their soonest availability.  Thank you,  PROVATION

## 2023-03-27 NOTE — H&P
[facility] 4[facility]  History & Physical    Subjective:      Chief Complaint/Reason for Admission:     Surveillance colonoscopy personal history of colon adenomas and FDR with advance colon polyp less than age 60.    Vanessa Olsen is a 62 y.o. female.    Past Medical History:   Diagnosis Date    Allergy     Breast disorder 2000    Left Breast Bx - Benign     Cataract     Fracture, ribs     H/O cold sores     History of robot-assisted laparoscopic hysterectomy 07/28/2021    Hormone replacement therapy (HRT)     Menopause 2013    PONV (postoperative nausea and vomiting)      Past Surgical History:   Procedure Laterality Date    BREAST BIOPSY Left 2000    benign  (11:00  o'clock ?? )     CHOLECYSTECTOMY  2004    COLONOSCOPY N/A 3/5/2018    Procedure: COLONOSCOPY;  Surgeon: Francisco Du MD;  Location: [address]);  Service: Endoscopy;  Laterality: N/A;  3 year f/u - history of colon polyps - ER    CYSTOSCOPY N/A 7/28/2021    Procedure: CYSTOSCOPY;  Surgeon: Isaura Cassidy MD;  Location: [facility];  Service: OB/GYN;  Laterality: N/A;    HYSTERECTOMY  07/28/2021    HYSTEROSCOPY N/A 4/6/2021    Procedure: HYSTEROSCOPY;  Surgeon: Isaura Cassidy MD;  Location: [facility];  Service: OB/GYN;  Laterality: N/A;    ROBOT-ASSISTED LAPAROSCOPIC ABDOMINAL HYSTERECTOMY USING DA VANESSA XI Bilateral 7/28/2021    Procedure: XI ROBOTIC HYSTERECTOMY;  Surgeon: Isaura Cassidy MD;  Location: [facility];  Service: OB/GYN;  Laterality: Bilateral;  BSO    ROBOT-ASSISTED LAPAROSCOPIC SALPINGO-OOPHORECTOMY Bilateral 7/28/2021    Procedure: ROBOTIC SALPINGO-OOPHORECTOMY;  Surgeon: Isaura Cassidy MD;  Location: [facility];  Service: OB/GYN;  Laterality: Bilateral;    WISDOM TOOTH EXTRACTION       Family History   Problem Relation Age of Onset    Macular degeneration Mother     Cataracts Mother     Hypertension Mother     Heart disease Mother         at fib and pacemaker    Hypertension Father     Colon polyps  Father 82        large poly needing surgery    Pulmonary embolism Father     Glaucoma Brother     Hypertension Brother     Colon polyps Brother 58        several advanced colon adenomas    Lung cancer Brother     Breast cancer Paternal Aunt     Prostate cancer Paternal Uncle     Breast cancer Paternal Cousin     Amblyopia Neg Hx     Blindness Neg Hx     Diabetes Neg Hx     Retinal detachment Neg Hx     Strabismus Neg Hx     Stroke Neg Hx     Thyroid disease Neg Hx     Celiac disease Neg Hx     Cirrhosis Neg Hx     Colon cancer Neg Hx     Esophageal cancer Neg Hx     Inflammatory bowel disease Neg Hx     Liver disease Neg Hx     Rectal cancer Neg Hx     Stomach cancer Neg Hx     Liver cancer Neg Hx     Ulcerative colitis Neg Hx     Ovarian cancer Neg Hx      Social History     Tobacco Use    Smoking status: Never    Smokeless tobacco: Never   Substance Use Topics    Alcohol use: Yes     Comment: socially    Drug use: No       PTA Medications   Medication Sig    cetirizine (ZYRTEC) 5 MG tablet Take 5 mg by mouth once daily.    ergocalciferol, vitamin D2, (VITAMIN D ORAL) Take by mouth.    estradioL (ESTRACE) 2 MG tablet Take 1 tablet (2 mg total) by mouth once daily.    progesterone (PROMETRIUM) 200 MG capsule Take 1 capsule by mouth 30-60 minutes before bed every night    RESTASIS 0.05 % ophthalmic emulsion PLACE 1 DROP (0.4MLS) INTO BOTH EYES TWO TIMES A DAY    fluocinonide 0.05% (LIDEX) 0.05 % cream Apply topically 2 (two) times daily.    peg 400-propylene glycol 0.4-0.3 % Drop Apply 1 drop to eye 3 (three) times daily.    PENCICLOVIR TOP 1 % by Other route.    prasterone, dhea, (INTRAROSA) 6.5 mg Inst Place 6.5 mg vaginally every evening.     Review of patient's allergies indicates:  No Known Allergies     Review of Systems   Constitutional:  Negative for fever.   Respiratory:  Negative for shortness of breath.    Cardiovascular:  Negative for chest pain.   Gastrointestinal:  Negative for abdominal pain.      Objective:      Vital Signs (Most Recent)  Temp: 97.2 °F (36.2 °C) (03/27/23 1213)  Pulse: 63 (03/27/23 1213)  Resp: 16 (03/27/23 1213)  BP: 130/61 (03/27/23 1213)  SpO2: 99 % (03/27/23 1213)    Vital Signs Range (Last 24H):  Temp:  [97.2 °F (36.2 °C)]   Pulse:  [63]   Resp:  [16]   BP: (130)/(61)   SpO2:  [99 %]     Physical Exam  Constitutional:       Appearance: Normal appearance.   Cardiovascular:      Rate and Rhythm: Normal rate.   Pulmonary:      Effort: Pulmonary effort is normal.   Neurological:      Mental Status: She is alert and oriented to person, place, and time.         Assessment:        Surveillance colonoscopy personal history of colon adenomas and FDR with advance colon polyp less than age 60.      Plan:      Surveillance colonoscopy personal history of colon adenomas and FDR with advance colon polyp less than age 60.

## 2023-03-30 NOTE — ANESTHESIA POSTPROCEDURE EVALUATION
Anesthesia Post Evaluation    Patient: Vanessa LOPEZ Ponthieux    Procedure(s) Performed: Procedure(s) (LRB):  COLONOSCOPY (N/A)    Final Anesthesia Type: general      Patient location during evaluation: GI PACU  Patient participation: Yes- Able to Participate  Level of consciousness: awake  Post-procedure vital signs: reviewed and stable  Pain management: adequate  Airway patency: patent    PONV status at discharge: No PONV  Anesthetic complications: no      Cardiovascular status: blood pressure returned to baseline  Respiratory status: unassisted  Hydration status: euvolemic  Follow-up not needed.          Vitals Value Taken Time   /60 03/27/23 1415   Temp 36.6 °C (97.9 °F) 03/27/23 1345   Pulse 62 03/27/23 1415   Resp 18 03/27/23 1415   SpO2 99 % 03/27/23 1415         No case tracking events are documented in the log.      Pain/Joan Score: No data recorded

## 2023-04-12 ENCOUNTER — PATIENT MESSAGE (OUTPATIENT)
Dept: OPTOMETRY | Facility: CLINIC | Age: 63
End: 2023-04-12
Payer: COMMERCIAL

## 2023-04-12 DIAGNOSIS — H04.123 BILATERAL DRY EYES: ICD-10-CM

## 2023-04-12 RX ORDER — FLUOROMETHOLONE 1 MG/ML
1 SUSPENSION/ DROPS OPHTHALMIC 4 TIMES DAILY
Qty: 5 ML | Refills: 0 | Status: SHIPPED | OUTPATIENT
Start: 2023-04-12 | End: 2023-04-22

## 2023-04-12 NOTE — PROGRESS NOTES
Assessment /Plan     For exam results, see Encounter Report.    Bilateral dry eyes  -     fluorometholone 0.1% (FML) 0.1 % DrpS; Place 1 drop into both eyes 4 (four) times daily. for 10 days  Dispense: 5 mL; Refill: 0      Sent steroid eyedrop refill per pt request. RTC or call if no better in 1 week

## 2023-04-19 ENCOUNTER — PATIENT MESSAGE (OUTPATIENT)
Dept: OBSTETRICS AND GYNECOLOGY | Facility: CLINIC | Age: 63
End: 2023-04-19
Payer: COMMERCIAL

## 2023-05-22 ENCOUNTER — PATIENT MESSAGE (OUTPATIENT)
Dept: INTERNAL MEDICINE | Facility: CLINIC | Age: 63
End: 2023-05-22
Payer: COMMERCIAL

## 2023-05-22 DIAGNOSIS — Z78.0 MENOPAUSE: ICD-10-CM

## 2023-05-22 RX ORDER — PROGESTERONE 200 MG/1
CAPSULE ORAL
Qty: 90 CAPSULE | Refills: 0 | Status: CANCELLED | OUTPATIENT
Start: 2023-05-22

## 2023-05-23 RX ORDER — PROGESTERONE 200 MG/1
CAPSULE ORAL
Qty: 90 CAPSULE | Refills: 0 | Status: SHIPPED | OUTPATIENT
Start: 2023-05-23 | End: 2024-01-31

## 2023-06-01 ENCOUNTER — PATIENT MESSAGE (OUTPATIENT)
Dept: GASTROENTEROLOGY | Facility: CLINIC | Age: 63
End: 2023-06-01
Payer: COMMERCIAL

## 2023-06-02 ENCOUNTER — CLINICAL SUPPORT (OUTPATIENT)
Dept: OTOLARYNGOLOGY | Facility: CLINIC | Age: 63
End: 2023-06-02
Payer: COMMERCIAL

## 2023-06-02 ENCOUNTER — OFFICE VISIT (OUTPATIENT)
Dept: OTOLARYNGOLOGY | Facility: CLINIC | Age: 63
End: 2023-06-02
Payer: COMMERCIAL

## 2023-06-02 VITALS
DIASTOLIC BLOOD PRESSURE: 74 MMHG | BODY MASS INDEX: 35.85 KG/M2 | TEMPERATURE: 99 F | WEIGHT: 235.81 LBS | HEART RATE: 76 BPM | SYSTOLIC BLOOD PRESSURE: 128 MMHG

## 2023-06-02 DIAGNOSIS — H92.02 OTALGIA, LEFT EAR: Primary | ICD-10-CM

## 2023-06-02 DIAGNOSIS — M26.622 ARTHRALGIA OF LEFT TEMPOROMANDIBULAR JOINT: Primary | Chronic | ICD-10-CM

## 2023-06-02 DIAGNOSIS — J30.89 NON-SEASONAL ALLERGIC RHINITIS, UNSPECIFIED TRIGGER: Chronic | ICD-10-CM

## 2023-06-02 PROCEDURE — 3074F PR MOST RECENT SYSTOLIC BLOOD PRESSURE < 130 MM HG: ICD-10-PCS | Mod: CPTII,S$GLB,, | Performed by: OTOLARYNGOLOGY

## 2023-06-02 PROCEDURE — 1159F PR MEDICATION LIST DOCUMENTED IN MEDICAL RECORD: ICD-10-PCS | Mod: CPTII,S$GLB,, | Performed by: OTOLARYNGOLOGY

## 2023-06-02 PROCEDURE — 99214 OFFICE O/P EST MOD 30 MIN: CPT | Mod: S$GLB,,, | Performed by: OTOLARYNGOLOGY

## 2023-06-02 PROCEDURE — 3008F BODY MASS INDEX DOCD: CPT | Mod: CPTII,S$GLB,, | Performed by: OTOLARYNGOLOGY

## 2023-06-02 PROCEDURE — 92567 TYMPANOMETRY: CPT | Mod: S$GLB,,, | Performed by: PHYSICIAN ASSISTANT

## 2023-06-02 PROCEDURE — 92556 SPEECH AUDIOMETRY COMPLETE: CPT | Mod: S$GLB,,, | Performed by: PHYSICIAN ASSISTANT

## 2023-06-02 PROCEDURE — 1159F MED LIST DOCD IN RCRD: CPT | Mod: CPTII,S$GLB,, | Performed by: OTOLARYNGOLOGY

## 2023-06-02 PROCEDURE — 3008F PR BODY MASS INDEX (BMI) DOCUMENTED: ICD-10-PCS | Mod: CPTII,S$GLB,, | Performed by: OTOLARYNGOLOGY

## 2023-06-02 PROCEDURE — 92552 PURE TONE AUDIOMETRY AIR: CPT | Mod: S$GLB,,, | Performed by: PHYSICIAN ASSISTANT

## 2023-06-02 PROCEDURE — 99999 PR PBB SHADOW E&M-EST. PATIENT-LVL III: ICD-10-PCS | Mod: PBBFAC,,, | Performed by: OTOLARYNGOLOGY

## 2023-06-02 PROCEDURE — 1160F PR REVIEW ALL MEDS BY PRESCRIBER/CLIN PHARMACIST DOCUMENTED: ICD-10-PCS | Mod: CPTII,S$GLB,, | Performed by: OTOLARYNGOLOGY

## 2023-06-02 PROCEDURE — 99999 PR PBB SHADOW E&M-EST. PATIENT-LVL I: CPT | Mod: PBBFAC,,, | Performed by: PHYSICIAN ASSISTANT

## 2023-06-02 PROCEDURE — 99999 PR PBB SHADOW E&M-EST. PATIENT-LVL I: ICD-10-PCS | Mod: PBBFAC,,, | Performed by: PHYSICIAN ASSISTANT

## 2023-06-02 PROCEDURE — 92552 PR PURE TONE AUDIOMETRY, AIR: ICD-10-PCS | Mod: S$GLB,,, | Performed by: PHYSICIAN ASSISTANT

## 2023-06-02 PROCEDURE — 92556 PR SPEECH AUDIOMETRY, COMPLETE: ICD-10-PCS | Mod: S$GLB,,, | Performed by: PHYSICIAN ASSISTANT

## 2023-06-02 PROCEDURE — 92567 PR TYMPA2METRY: ICD-10-PCS | Mod: S$GLB,,, | Performed by: PHYSICIAN ASSISTANT

## 2023-06-02 PROCEDURE — 3078F DIAST BP <80 MM HG: CPT | Mod: CPTII,S$GLB,, | Performed by: OTOLARYNGOLOGY

## 2023-06-02 PROCEDURE — 99214 PR OFFICE/OUTPT VISIT, EST, LEVL IV, 30-39 MIN: ICD-10-PCS | Mod: S$GLB,,, | Performed by: OTOLARYNGOLOGY

## 2023-06-02 PROCEDURE — 3074F SYST BP LT 130 MM HG: CPT | Mod: CPTII,S$GLB,, | Performed by: OTOLARYNGOLOGY

## 2023-06-02 PROCEDURE — 3078F PR MOST RECENT DIASTOLIC BLOOD PRESSURE < 80 MM HG: ICD-10-PCS | Mod: CPTII,S$GLB,, | Performed by: OTOLARYNGOLOGY

## 2023-06-02 PROCEDURE — 1160F RVW MEDS BY RX/DR IN RCRD: CPT | Mod: CPTII,S$GLB,, | Performed by: OTOLARYNGOLOGY

## 2023-06-02 PROCEDURE — 99999 PR PBB SHADOW E&M-EST. PATIENT-LVL III: CPT | Mod: PBBFAC,,, | Performed by: OTOLARYNGOLOGY

## 2023-06-02 RX ORDER — METHOCARBAMOL 500 MG/1
500 TABLET, FILM COATED ORAL 3 TIMES DAILY
Qty: 30 TABLET | Refills: 0 | Status: SHIPPED | OUTPATIENT
Start: 2023-06-02 | End: 2023-06-12

## 2023-06-02 RX ORDER — NAPROXEN 500 MG/1
500 TABLET ORAL 2 TIMES DAILY WITH MEALS
Qty: 20 TABLET | Refills: 0 | Status: SHIPPED | OUTPATIENT
Start: 2023-06-02 | End: 2023-06-12

## 2023-06-02 NOTE — PROGRESS NOTES
Chief Complaint   Patient presents with    Other     Left ear paining for a few days    .     HPI: Vanessa Olsen is 62 y.o. female who is self-referred for evaluation of left ear pain.  Symptoms include left ear pain. Symptoms began a few days ago and are unchanged since that time. Patient denies otorrhea, hearing loss, post-auricular pain, nasal congestion, facial pressure.  Ear history: 0 previous ear infections. She describes the pain as in front of the ear and radiating down her neck.           Past Medical History:   Diagnosis Date    Allergy     Breast disorder 2000    Left Breast Bx - Benign     Cataract     Fracture, ribs     H/O cold sores     History of robot-assisted laparoscopic hysterectomy 07/28/2021    Hormone replacement therapy (HRT)     Menopause 2013    PONV (postoperative nausea and vomiting)      Social History     Socioeconomic History    Marital status: Single   Tobacco Use    Smoking status: Never    Smokeless tobacco: Never   Substance and Sexual Activity    Alcohol use: Yes     Comment: socially    Drug use: No    Sexual activity: Not Currently     Partners: Male     Birth control/protection: Post-menopausal, Surgical     Comment: Single:         2013  //  DVH/BSO 2021     Social Determinants of Health     Financial Resource Strain: Low Risk     Difficulty of Paying Living Expenses: Not hard at all   Food Insecurity: No Food Insecurity    Worried About Running Out of Food in the Last Year: Never true    Ran Out of Food in the Last Year: Never true   Transportation Needs: No Transportation Needs    Lack of Transportation (Medical): No    Lack of Transportation (Non-Medical): No   Physical Activity: Insufficiently Active    Days of Exercise per Week: 1 day    Minutes of Exercise per Session: 30 min   Stress: No Stress Concern Present    Feeling of Stress : Only a little   Social Connections: Unknown    Frequency of Communication with Friends and Family: Three times a week     Frequency of Social Gatherings with Friends and Family: Twice a week    Active Member of Clubs or Organizations: No    Attends Club or Organization Meetings: Never    Marital Status: Never    Housing Stability: Low Risk     Unable to Pay for Housing in the Last Year: No    Number of Places Lived in the Last Year: 1    Unstable Housing in the Last Year: No     Past Surgical History:   Procedure Laterality Date    BREAST BIOPSY Left 2000    benign  (11:00  o'clock ?? )     CHOLECYSTECTOMY  2004    COLONOSCOPY N/A 3/5/2018    Procedure: COLONOSCOPY;  Surgeon: Francisco Du MD;  Location: [facility] ([address] [place] [address]);  Service: Endoscopy;  Laterality: N/A;  3 year f/u - history of colon polyps - ER    COLONOSCOPY N/A 3/27/2023    Procedure: COLONOSCOPY;  Surgeon: Francisco Du MD;  Location: [facility] ([address] [place] [address]);  Service: Endoscopy;  Laterality: N/A;  Pt request Suprep , instr via portal - PC  pre-call bm 03/21.    CYSTOSCOPY N/A 7/28/2021    Procedure: CYSTOSCOPY;  Surgeon: Isaura Cassidy MD;  Location: [facility];  Service: OB/GYN;  Laterality: N/A;    HYSTERECTOMY  07/28/2021    HYSTEROSCOPY N/A 4/6/2021    Procedure: HYSTEROSCOPY;  Surgeon: Isaura Cassidy MD;  Location: [facility];  Service: OB/GYN;  Laterality: N/A;    ROBOT-ASSISTED LAPAROSCOPIC ABDOMINAL HYSTERECTOMY USING DA VANESSA XI Bilateral 7/28/2021    Procedure: XI ROBOTIC HYSTERECTOMY;  Surgeon: Isaura Cassidy MD;  Location: [facility];  Service: OB/GYN;  Laterality: Bilateral;  BSO    ROBOT-ASSISTED LAPAROSCOPIC SALPINGO-OOPHORECTOMY Bilateral 7/28/2021    Procedure: ROBOTIC SALPINGO-OOPHORECTOMY;  Surgeon: Isaura Cassidy MD;  Location: [facility];  Service: OB/GYN;  Laterality: Bilateral;    WISDOM TOOTH EXTRACTION       Family History   Problem Relation Age of Onset    Macular degeneration Mother     Cataracts Mother     Hypertension Mother     Heart disease Mother         at fib and pacemaker    Hypertension Father     Colon polyps Father 82         large poly needing surgery    Pulmonary embolism Father     Glaucoma Brother     Hypertension Brother     Colon polyps Brother 58        several advanced colon adenomas    Lung cancer Brother     Breast cancer Paternal Aunt     Prostate cancer Paternal Uncle     Breast cancer Paternal Cousin     Amblyopia Neg Hx     Blindness Neg Hx     Diabetes Neg Hx     Retinal detachment Neg Hx     Strabismus Neg Hx     Stroke Neg Hx     Thyroid disease Neg Hx     Celiac disease Neg Hx     Cirrhosis Neg Hx     Colon cancer Neg Hx     Esophageal cancer Neg Hx     Inflammatory bowel disease Neg Hx     Liver disease Neg Hx     Rectal cancer Neg Hx     Stomach cancer Neg Hx     Liver cancer Neg Hx     Ulcerative colitis Neg Hx     Ovarian cancer Neg Hx            Review of Systems  General: negative for chills, fever or weight loss  Psychological: negative for mood changes or depression  Ophthalmic: negative for blurry vision, photophobia or eye pain  ENT: see HPI  Respiratory: no cough, shortness of breath, or wheezing  Cardiovascular: no chest pain or dyspnea on exertion  Gastrointestinal: no abdominal pain, change in bowel habits, or black/ bloody stools  Musculoskeletal: negative for gait disturbance or muscular weakness  Neurological: no syncope or seizures; no ataxia  Dermatological: negative for puritis,  rash and jaundice  Hematologic/lymphatic: no easy bruising, no new lumps or bumps      Physical Exam:    Vitals:    06/02/23 1422   BP: 128/74   Pulse: 76   Temp: 98.7 °F (37.1 °C)       Constitutional: Well appearing / communicating without difficutly.  NAD.  Eyes: EOM I Bilaterally  Head/Face: Normocephalic.  Negative paranasal sinus pressure/tenderness.  Salivary glands WNL.  House Brackmann I Bilaterally. + tenderness TMJ left; + crepitus on exam    Right Ear: Auricle normal appearance. External Auditory Canal within normal limits no lesions or masses,TM w/o masses/lesions/perforations. TM mobility noted.   Left  Ear: Auricle normal appearance. External Auditory Canal within normal limits no lesions or masses,TM w/o masses/lesions/perforations. TM mobility noted.  Nose: No gross nasal septal deviation. Inferior Turbinates 3+ bilaterally. No septal perforation. No masses/lesions. External nasal skin appears normal without masses/lesions.  Oral Cavity: Gingiva/lips within normal limits.  Dentition/gingiva healthy appearing. Mucus membranes moist. Floor of mouth soft, no masses palpated. Oral Tongue mobile. Hard Palate appears normal.    Oropharynx: Base of tongue appears normal. No masses/lesions noted. Tonsillar fossa/pharyngeal wall without lesions. Posterior oropharynx WNL.  Soft palate without masses. Midline uvula.   Neck/Lymphatic: No LAD I-VI bilaterally.  No thyromegaly.  No masses noted on exam.        Diagnostic testing reviewed:    Audiogram interpreted personally by me and discussed in detail with the patient today.  Hearing bilaterally.  Tympanometry shows  Type a tympanograms bilaterally.            Assessment:    ICD-10-CM ICD-9-CM    1. Arthralgia of left temporomandibular joint  M26.622 524.62       2. Non-seasonal allergic rhinitis, unspecified trigger  J30.89 477.8         The primary encounter diagnosis was Arthralgia of left temporomandibular joint. A diagnosis of Non-seasonal allergic rhinitis, unspecified trigger was also pertinent to this visit.      Plan:  No orders of the defined types were placed in this encounter.    We had a long discussion regarding the underlying pathology of temporomandibular joint dysfunction (TMD) as the cause of ear pain.  We further discussed conservative measures to treat TMD including avoiding gum and other foods that require lots of chewing, warm compresses, and scheduled antinflammatories.  Prescription sent for naproxen 500 mg p.o. b.i.d. with meals and Robaxin 500 mg p.o. t.i.d..  If the pain persists, the patient will then schedule an appointment with a dentist for  further evaluation.  Continue Flonase and Zyrtec for allergic rhinitis.  Call or return to clinic prn if these symptoms worsen or fail to improve as anticipated.    Salma Vu MD

## 2023-06-02 NOTE — PROGRESS NOTES
Vanessa Olsen, a 62 y.o. female, was seen today in the clinic for an audiologic evaluation.  Patients main complaint was left ear pain for the past couple of days.       Audiogram results revealed normal hearing sensitivity bilaterally.  Speech reception thresholds were noted at 5 dB in the right ear and 5 dB in the left ear.  Speech discrimination scores were 96% in the right ear and 100% in the left ear.  Tympanometry revealed Type A in the right ear and Type A in the left ear.     Recommendations:  Otologic evaluation  Annual audiogram  Hearing protection when in noise

## 2023-06-12 ENCOUNTER — PATIENT MESSAGE (OUTPATIENT)
Dept: GASTROENTEROLOGY | Facility: CLINIC | Age: 63
End: 2023-06-12
Payer: COMMERCIAL

## 2023-07-12 DIAGNOSIS — H04.123 BILATERAL DRY EYES: ICD-10-CM

## 2023-07-12 RX ORDER — CYCLOSPORINE/CHONDROITIN SULFATE PF 1 MG/ML
1 EMULSION OPHTHALMIC 2 TIMES DAILY
Qty: 5.5 ML | Refills: 11 | Status: SHIPPED | OUTPATIENT
Start: 2023-07-12 | End: 2023-07-14 | Stop reason: SDUPTHER

## 2023-07-12 RX ORDER — CYCLOSPORINE 0.5 MG/ML
EMULSION OPHTHALMIC
Qty: 180 EACH | Refills: 3 | Status: CANCELLED | OUTPATIENT
Start: 2023-07-12

## 2023-07-14 ENCOUNTER — PATIENT MESSAGE (OUTPATIENT)
Dept: OPTOMETRY | Facility: CLINIC | Age: 63
End: 2023-07-14
Payer: COMMERCIAL

## 2023-07-14 DIAGNOSIS — H04.123 BILATERAL DRY EYES: ICD-10-CM

## 2023-07-14 RX ORDER — CYCLOSPORINE 0.5 MG/ML
1 EMULSION OPHTHALMIC 2 TIMES DAILY
Qty: 60 EACH | Refills: 11 | Status: SHIPPED | OUTPATIENT
Start: 2023-07-14 | End: 2024-01-29 | Stop reason: SDUPTHER

## 2023-07-14 RX ORDER — CYCLOSPORINE/CHONDROITIN SULFATE PF 1 MG/ML
1 EMULSION OPHTHALMIC 2 TIMES DAILY
Qty: 5.5 ML | Refills: 11 | Status: SHIPPED | OUTPATIENT
Start: 2023-07-14 | End: 2023-07-14 | Stop reason: RX

## 2023-07-14 RX ORDER — CYCLOSPORINE 0.5 MG/ML
EMULSION OPHTHALMIC
Qty: 180 EACH | Refills: 3 | Status: CANCELLED | OUTPATIENT
Start: 2023-07-14

## 2023-07-14 NOTE — PROGRESS NOTES
Assessment /Plan     For exam results, see Encounter Report.    There are no diagnoses linked to this encounter.  Sent restasis refill to [facility]

## 2023-07-31 ENCOUNTER — OFFICE VISIT (OUTPATIENT)
Dept: OBSTETRICS AND GYNECOLOGY | Facility: CLINIC | Age: 63
End: 2023-07-31
Attending: OBSTETRICS & GYNECOLOGY
Payer: COMMERCIAL

## 2023-07-31 VITALS
DIASTOLIC BLOOD PRESSURE: 75 MMHG | HEIGHT: 68 IN | HEART RATE: 70 BPM | BODY MASS INDEX: 36.37 KG/M2 | WEIGHT: 240 LBS | SYSTOLIC BLOOD PRESSURE: 129 MMHG

## 2023-07-31 DIAGNOSIS — Z79.890 HORMONE REPLACEMENT THERAPY (HRT): ICD-10-CM

## 2023-07-31 DIAGNOSIS — Z12.31 OTHER SCREENING MAMMOGRAM: Primary | ICD-10-CM

## 2023-07-31 PROCEDURE — 99999 PR PBB SHADOW E&M-EST. PATIENT-LVL III: ICD-10-PCS | Mod: PBBFAC,,, | Performed by: OBSTETRICS & GYNECOLOGY

## 2023-07-31 PROCEDURE — 3078F PR MOST RECENT DIASTOLIC BLOOD PRESSURE < 80 MM HG: ICD-10-PCS | Mod: CPTII,S$GLB,, | Performed by: OBSTETRICS & GYNECOLOGY

## 2023-07-31 PROCEDURE — 3074F PR MOST RECENT SYSTOLIC BLOOD PRESSURE < 130 MM HG: ICD-10-PCS | Mod: CPTII,S$GLB,, | Performed by: OBSTETRICS & GYNECOLOGY

## 2023-07-31 PROCEDURE — 1159F MED LIST DOCD IN RCRD: CPT | Mod: CPTII,S$GLB,, | Performed by: OBSTETRICS & GYNECOLOGY

## 2023-07-31 PROCEDURE — 1159F PR MEDICATION LIST DOCUMENTED IN MEDICAL RECORD: ICD-10-PCS | Mod: CPTII,S$GLB,, | Performed by: OBSTETRICS & GYNECOLOGY

## 2023-07-31 PROCEDURE — 3008F BODY MASS INDEX DOCD: CPT | Mod: CPTII,S$GLB,, | Performed by: OBSTETRICS & GYNECOLOGY

## 2023-07-31 PROCEDURE — 3074F SYST BP LT 130 MM HG: CPT | Mod: CPTII,S$GLB,, | Performed by: OBSTETRICS & GYNECOLOGY

## 2023-07-31 PROCEDURE — 99214 OFFICE O/P EST MOD 30 MIN: CPT | Mod: S$GLB,,, | Performed by: OBSTETRICS & GYNECOLOGY

## 2023-07-31 PROCEDURE — 99999 PR PBB SHADOW E&M-EST. PATIENT-LVL III: CPT | Mod: PBBFAC,,, | Performed by: OBSTETRICS & GYNECOLOGY

## 2023-07-31 PROCEDURE — 3078F DIAST BP <80 MM HG: CPT | Mod: CPTII,S$GLB,, | Performed by: OBSTETRICS & GYNECOLOGY

## 2023-07-31 PROCEDURE — 3008F PR BODY MASS INDEX (BMI) DOCUMENTED: ICD-10-PCS | Mod: CPTII,S$GLB,, | Performed by: OBSTETRICS & GYNECOLOGY

## 2023-07-31 PROCEDURE — 99214 PR OFFICE/OUTPT VISIT, EST, LEVL IV, 30-39 MIN: ICD-10-PCS | Mod: S$GLB,,, | Performed by: OBSTETRICS & GYNECOLOGY

## 2023-07-31 NOTE — PROGRESS NOTES
Subjective:      Vanessa Olsen is a 62 y.o. female who presents to discuss hormone replacement therapy.  the patient went into menopause at 52 years of age, which was 10 years ago. Patient started HRT 10 years ago due to hot flashes and night sweats. The patient is taking hormone replacement therapy. Patient denies post-menopausal vaginal bleeding.   She denies the following contraindications to HRT:  Vaginal bleeding, history of VTE/PE, thrombophilia,  breast cancer, or active liver disease.     She also reports weight gain. Prior to Covid she was taking Kenneth but is no longer   Yessi Odom MD           [unfilled]   Hemoglobin A1C   Date Value Ref Range Status   04/06/2021 5.0 4.0 - 5.6 % Final     Comment:     ADA Screening Guidelines:  5.7-6.4%  Consistent with prediabetes  >or=6.5%  Consistent with diabetes    High levels of fetal hemoglobin interfere with the HbA1C  assay. Heterozygous hemoglobin variants (HbS, HgC, etc)do  not significantly interfere with this assay.   However, presence of multiple variants may affect accuracy.             Pap smear: 12/3/2019  Mammogram: 9/21/2022 TC score 9.98%  DEXA: normal 11/25/2019  Colonoscopy: 3/27/2023    No visits with results within 3 Month(s) from this visit.   Latest known visit with results is:   Clinical Support on 03/07/2023   Component Date Value Ref Range Status    POC Cholesterol, Total 03/07/2023 180  <240 MG/DL Final    POC Cholesterol, HDL 03/07/2023 62  MG/DL Final    POC Cholesterol, LDL 03/07/2023 84  <160 MG/DL Final    POC Triglycerides 03/07/2023 202 (H)  <160 MG/DL Final    POC Glucose 03/07/2023 94  60 - 140 MG/DL Final       Past Medical History:   Diagnosis Date    Allergy     Breast disorder 2000    Left Breast Bx - Benign     Cataract     Fracture, ribs     H/O cold sores     History of robot-assisted laparoscopic hysterectomy 07/28/2021    Hormone replacement therapy (HRT)     Menopause 2013    PONV (postoperative nausea  and vomiting)      Past Surgical History:   Procedure Laterality Date    BREAST BIOPSY Left 2000    benign  (11:00  o'clock ?? )     CHOLECYSTECTOMY  2004    COLONOSCOPY N/A 3/5/2018    Procedure: COLONOSCOPY;  Surgeon: Francisco Du MD;  Location: [facility] (4TH FLR);  Service: Endoscopy;  Laterality: N/A;  3 year f/u - history of colon polyps - ER    COLONOSCOPY N/A 3/27/2023    Procedure: COLONOSCOPY;  Surgeon: Francisco Du MD;  Location: [facility] (4TH FLR);  Service: Endoscopy;  Laterality: N/A;  Pt request Suprep , instr via portal - PC  pre-call bm 03/21.    CYSTOSCOPY N/A 7/28/2021    Procedure: CYSTOSCOPY;  Surgeon: Isaura Cassidy MD;  Location: [facility];  Service: OB/GYN;  Laterality: N/A;    HYSTERECTOMY  07/28/2021    HYSTEROSCOPY N/A 4/6/2021    Procedure: HYSTEROSCOPY;  Surgeon: Isaura Cassidy MD;  Location: [facility];  Service: OB/GYN;  Laterality: N/A;    ROBOT-ASSISTED LAPAROSCOPIC ABDOMINAL HYSTERECTOMY USING DA VANESSA XI Bilateral 7/28/2021    Procedure: XI ROBOTIC HYSTERECTOMY;  Surgeon: Isaura Cassidy MD;  Location: [facility];  Service: OB/GYN;  Laterality: Bilateral;  BSO    ROBOT-ASSISTED LAPAROSCOPIC SALPINGO-OOPHORECTOMY Bilateral 7/28/2021    Procedure: ROBOTIC SALPINGO-OOPHORECTOMY;  Surgeon: Isaura Cassidy MD;  Location: [facility];  Service: OB/GYN;  Laterality: Bilateral;    WISDOM TOOTH EXTRACTION       Social History     Tobacco Use    Smoking status: Never    Smokeless tobacco: Never   Substance Use Topics    Alcohol use: Yes     Comment: socially    Drug use: No     Family History   Problem Relation Age of Onset    Hypertension Father     Colon polyps Father 82        large poly needing surgery    Pulmonary embolism Father     Macular degeneration Mother     Cataracts Mother     Hypertension Mother     Heart disease Mother         at fib and pacemaker    Glaucoma Brother     Hypertension Brother     Colon polyps Brother 58        several advanced colon adenomas     "Lung cancer Brother     Breast cancer Paternal Aunt     Prostate cancer Paternal Uncle     Breast cancer Paternal Cousin     Amblyopia Neg Hx     Blindness Neg Hx     Diabetes Neg Hx     Retinal detachment Neg Hx     Strabismus Neg Hx     Stroke Neg Hx     Thyroid disease Neg Hx     Celiac disease Neg Hx     Cirrhosis Neg Hx     Colon cancer Neg Hx     Esophageal cancer Neg Hx     Inflammatory bowel disease Neg Hx     Liver disease Neg Hx     Rectal cancer Neg Hx     Stomach cancer Neg Hx     Liver cancer Neg Hx     Ulcerative colitis Neg Hx     Ovarian cancer Neg Hx     Cancer Neg Hx     Uterine cancer Neg Hx     Cervical cancer Neg Hx      OB History    Para Term  AB Living   0 0 0 0 0 0   SAB IAB Ectopic Multiple Live Births   0 0 0 0 0   Obstetric Comments   Menarche ~ 10       Current Outpatient Medications:     cetirizine (ZYRTEC) 5 MG tablet, Take 5 mg by mouth once daily., Disp: , Rfl:     cycloSPORINE (RESTASIS) 0.05 % ophthalmic emulsion, Place 1 drop into both eyes 2 (two) times daily., Disp: 60 each, Rfl: 11    ergocalciferol, vitamin D2, (VITAMIN D ORAL), Take by mouth., Disp: , Rfl:     estradioL (ESTRACE) 2 MG tablet, Take 1 tablet (2 mg total) by mouth once daily., Disp: 90 tablet, Rfl: 3    fluocinonide 0.05% (LIDEX) 0.05 % cream, Apply topically 2 (two) times daily., Disp: 60 g, Rfl: 1    peg 400-propylene glycol 0.4-0.3 % Drop, Apply 1 drop to eye 3 (three) times daily., Disp: , Rfl:     PENCICLOVIR TOP, 1 % by Other route., Disp: , Rfl:     prasterone, dhea, (INTRAROSA) 6.5 mg Inst, Place 6.5 mg vaginally every evening. (Patient not taking: Reported on 2023), Disp: 28 each, Rfl: 11    progesterone (PROMETRIUM) 200 MG capsule, Take 1 capsule by mouth 30-60 minutes before bed every night, Disp: 90 capsule, Rfl: 0    Vitals:    23 1102   BP: 129/75   Pulse: 70   Weight: 108.9 kg (240 lb)   Height: 5' 8" (1.727 m)   PainSc: 0-No pain     Body mass index is 36.49 " kg/m².    ROS:  Constitutional: no weight loss, +weight gain, fever, fatigue  Eyes:  No vision changes, glasses/contacts  ENT/Mouth: No ulcers, sinus problems, ears ringing, headache  Cardiovascular: No inability to lie flat, chest pain, exercise intolerance, swelling, heart palpitations  Respiratory: No wheezing, coughing blood, shortness of breath, or cough  Gastrointestinal: No diarrhea, bloody stool, nausea/vomiting, constipation, gas, hemorrhoids  Genitourinary: No blood in urine, painful urination, urgency of urination, frequency of urination, incomplete emptying, incontinence, abnormal bleeding, painful periods, heavy periods, vaginal discharge, vaginal odor, painful intercourse, sexual problems, bleeding after intercourse.  Musculoskeletal: No muscle weakness  Skin/Breast: No painful breasts, nipple discharge, masses, rash, ulcers  Neurological: No passing out, seizures, numbness, headache  Endocrine: No diabetes, hypothyroid, hyperthyroid, hot flashes, hair loss, abnormal hair growth, ance  Psychiatric: No depression, crying  Hematologic: No bruises, bleeding, swollen lymph nodes, anemia.    Assessment:    Other screening mammogram  -     Mammo Digital Screening Bilat w/ [employer]; Future; Expected date: 07/31/2023    Hormone replacement therapy (HRT)        Plan:   Risks and benefits of hormone replacement therapy were discussed.  Hormone replacement therapy options, including bioidentical versus non-bioidentical hormones, as well as alternatives discussed.       Estradiol 2mg orally QAM.- will have her follow up for WWE and will lower this medication or change to Vivelle dot      Prometrium 100mg nightly- patient can stop this medication     Intrarosa nightly- does not use          Discussed:   Weight bearing exercise for bone health  Diet and exercise for weight loss  Instructed patient to call if she experiences any side effects or has any questions.    Ttoal time 25 minutes- face to face, review of medical  record and arranging follow up  Follow up in 3 months

## 2023-08-08 NOTE — TELEPHONE ENCOUNTER
No care due was identified.  [facility] Embedded Care Due Messages. Reference number: [id number].   8/08/2023 2:36:10 PM CDT

## 2023-08-09 RX ORDER — PENCICLOVIR 10 MG/G
CREAM TOPICAL 2 TIMES DAILY
Qty: 5 G | Refills: 3 | Status: SHIPPED | OUTPATIENT
Start: 2023-08-09 | End: 2023-09-08

## 2023-08-09 NOTE — TELEPHONE ENCOUNTER
Refill Routing Note   Medication(s) are not appropriate for processing by [facility] for the following reason(s):      No active prescription written by provider: Historical Med    [facility] action(s):  Defer Care Due:  None identified     Medication Therapy Plan: Historical Med        Appointments  past 12m or future 3m with PCP    Date Provider   Last Visit   1/30/2023 Yessi Odom MD   Next Visit   1/31/2024 Yessi Odom MD   ED visits in past 90 days: 0        Note composed:10:08 AM 08/09/2023

## 2023-08-10 ENCOUNTER — TELEPHONE (OUTPATIENT)
Dept: PHARMACY | Facility: CLINIC | Age: 63
End: 2023-08-10
Payer: COMMERCIAL

## 2023-09-14 DIAGNOSIS — R52 PAIN: Primary | ICD-10-CM

## 2023-09-19 ENCOUNTER — HOSPITAL ENCOUNTER (OUTPATIENT)
Dept: RADIOLOGY | Facility: HOSPITAL | Age: 63
Discharge: HOME | End: 2023-09-19
Attending: ORTHOPAEDIC SURGERY
Payer: COMMERCIAL

## 2023-09-19 ENCOUNTER — OFFICE VISIT (OUTPATIENT)
Dept: ORTHOPEDICS | Facility: CLINIC | Age: 63
End: 2023-09-19
Payer: COMMERCIAL

## 2023-09-19 VITALS
DIASTOLIC BLOOD PRESSURE: 88 MMHG | HEIGHT: 68 IN | BODY MASS INDEX: 36.49 KG/M2 | HEART RATE: 88 BPM | SYSTOLIC BLOOD PRESSURE: 155 MMHG

## 2023-09-19 DIAGNOSIS — R52 PAIN: ICD-10-CM

## 2023-09-19 DIAGNOSIS — M77.12 LATERAL EPICONDYLITIS, LEFT ELBOW: Primary | ICD-10-CM

## 2023-09-19 PROCEDURE — 3079F PR MOST RECENT DIASTOLIC BLOOD PRESSURE 80-89 MM HG: ICD-10-PCS | Mod: CPTII,S$GLB,, | Performed by: ORTHOPAEDIC SURGERY

## 2023-09-19 PROCEDURE — 73080 XR ELBOW COMPLETE 3 VIEW LEFT: ICD-10-PCS | Mod: 26,LT,, | Performed by: RADIOLOGY

## 2023-09-19 PROCEDURE — 3077F PR MOST RECENT SYSTOLIC BLOOD PRESSURE >= 140 MM HG: ICD-10-PCS | Mod: CPTII,S$GLB,, | Performed by: ORTHOPAEDIC SURGERY

## 2023-09-19 PROCEDURE — 3079F DIAST BP 80-89 MM HG: CPT | Mod: CPTII,S$GLB,, | Performed by: ORTHOPAEDIC SURGERY

## 2023-09-19 PROCEDURE — 3008F PR BODY MASS INDEX (BMI) DOCUMENTED: ICD-10-PCS | Mod: CPTII,S$GLB,, | Performed by: ORTHOPAEDIC SURGERY

## 2023-09-19 PROCEDURE — 1160F RVW MEDS BY RX/DR IN RCRD: CPT | Mod: CPTII,S$GLB,, | Performed by: ORTHOPAEDIC SURGERY

## 2023-09-19 PROCEDURE — 1160F PR REVIEW ALL MEDS BY PRESCRIBER/CLIN PHARMACIST DOCUMENTED: ICD-10-PCS | Mod: CPTII,S$GLB,, | Performed by: ORTHOPAEDIC SURGERY

## 2023-09-19 PROCEDURE — 1159F MED LIST DOCD IN RCRD: CPT | Mod: CPTII,S$GLB,, | Performed by: ORTHOPAEDIC SURGERY

## 2023-09-19 PROCEDURE — 73080 X-RAY EXAM OF ELBOW: CPT | Mod: 26,LT,, | Performed by: RADIOLOGY

## 2023-09-19 PROCEDURE — 99204 OFFICE O/P NEW MOD 45 MIN: CPT | Mod: S$GLB,,, | Performed by: ORTHOPAEDIC SURGERY

## 2023-09-19 PROCEDURE — 99999 PR PBB SHADOW E&M-EST. PATIENT-LVL III: CPT | Mod: PBBFAC,,, | Performed by: ORTHOPAEDIC SURGERY

## 2023-09-19 PROCEDURE — 99999 PR PBB SHADOW E&M-EST. PATIENT-LVL III: ICD-10-PCS | Mod: PBBFAC,,, | Performed by: ORTHOPAEDIC SURGERY

## 2023-09-19 PROCEDURE — 73080 X-RAY EXAM OF ELBOW: CPT | Mod: TC,PN,LT

## 2023-09-19 PROCEDURE — 99204 PR OFFICE/OUTPT VISIT, NEW, LEVL IV, 45-59 MIN: ICD-10-PCS | Mod: S$GLB,,, | Performed by: ORTHOPAEDIC SURGERY

## 2023-09-19 PROCEDURE — 3008F BODY MASS INDEX DOCD: CPT | Mod: CPTII,S$GLB,, | Performed by: ORTHOPAEDIC SURGERY

## 2023-09-19 PROCEDURE — 3077F SYST BP >= 140 MM HG: CPT | Mod: CPTII,S$GLB,, | Performed by: ORTHOPAEDIC SURGERY

## 2023-09-19 PROCEDURE — 1159F PR MEDICATION LIST DOCUMENTED IN MEDICAL RECORD: ICD-10-PCS | Mod: CPTII,S$GLB,, | Performed by: ORTHOPAEDIC SURGERY

## 2023-09-19 NOTE — PROGRESS NOTES
Patient ID:   Vanessa Olsen is a 63 y.o. female.    Chief Complaint:   Left elbow pain    HPI:   Mrs. Olsen is presenting with a six week course of left lateral elbow pain. She reports 8/10 pain. She has been taking Tylenol and Ibuprofen. She denies any injury. She denies numbness or paresthesias. Pain is aggravated with lifting.     Medications:    Current Outpatient Medications:     cetirizine (ZYRTEC) 5 MG tablet, Take 5 mg by mouth once daily., Disp: , Rfl:     cycloSPORINE (RESTASIS) 0.05 % ophthalmic emulsion, Place 1 drop into both eyes 2 (two) times daily., Disp: 60 each, Rfl: 11    ergocalciferol, vitamin D2, (VITAMIN D ORAL), Take by mouth., Disp: , Rfl:     estradioL (ESTRACE) 2 MG tablet, Take 1 tablet (2 mg total) by mouth once daily., Disp: 90 tablet, Rfl: 3    fluocinonide 0.05% (LIDEX) 0.05 % cream, Apply topically 2 (two) times daily., Disp: 60 g, Rfl: 1    peg 400-propylene glycol 0.4-0.3 % Drop, Apply 1 drop to eye 3 (three) times daily., Disp: , Rfl:     prasterone, dhea, (INTRAROSA) 6.5 mg Inst, Place 6.5 mg vaginally every evening., Disp: 28 each, Rfl: 11    progesterone (PROMETRIUM) 200 MG capsule, Take 1 capsule by mouth 30-60 minutes before bed every night, Disp: 90 capsule, Rfl: 0    Allergies:  Review of patient's allergies indicates:  No Known Allergies    Past Medical History:  Past Medical History:   Diagnosis Date    Allergy     Breast disorder 2000    Left Breast Bx - Benign     Cataract     Fracture, ribs     H/O cold sores     History of robot-assisted laparoscopic hysterectomy 07/28/2021    Hormone replacement therapy (HRT)     Menopause 2013    PONV (postoperative nausea and vomiting)         Past Surgical History:  Past Surgical History:   Procedure Laterality Date    BREAST BIOPSY Left 2000    benign  (11:00  o'clock ?? )     CHOLECYSTECTOMY  2004    COLONOSCOPY N/A 3/5/2018    Procedure: COLONOSCOPY;  Surgeon: Francisco Du MD;  Location: [facility] ([address] [place] [address]);   Service: Endoscopy;  Laterality: N/A;  3 year f/u - history of colon polyps - ER    COLONOSCOPY N/A 3/27/2023    Procedure: COLONOSCOPY;  Surgeon: Francisco Du MD;  Location: [address];  Service: Endoscopy;  Laterality: N/A;  Pt request Suprep , instr via portal - PC  pre-call bm 03/21.    CYSTOSCOPY N/A 7/28/2021    Procedure: CYSTOSCOPY;  Surgeon: Isaura Cassidy MD;  Location: [facility];  Service: OB/GYN;  Laterality: N/A;    HYSTERECTOMY  07/28/2021    HYSTEROSCOPY N/A 4/6/2021    Procedure: HYSTEROSCOPY;  Surgeon: Isaura Cassidy MD;  Location: [facility];  Service: OB/GYN;  Laterality: N/A;    ROBOT-ASSISTED LAPAROSCOPIC ABDOMINAL HYSTERECTOMY USING DA VANESSA XI Bilateral 7/28/2021    Procedure: XI ROBOTIC HYSTERECTOMY;  Surgeon: Isaura Cassidy MD;  Location: [facility];  Service: OB/GYN;  Laterality: Bilateral;  BSO    ROBOT-ASSISTED LAPAROSCOPIC SALPINGO-OOPHORECTOMY Bilateral 7/28/2021    Procedure: ROBOTIC SALPINGO-OOPHORECTOMY;  Surgeon: Isaura Cassidy MD;  Location: [facility];  Service: OB/GYN;  Laterality: Bilateral;    WISDOM TOOTH EXTRACTION         Social History:  Social History     Occupational History    Not on file   Tobacco Use    Smoking status: Never    Smokeless tobacco: Never   Substance and Sexual Activity    Alcohol use: Yes     Comment: socially    Drug use: No    Sexual activity: Not Currently     Partners: Male     Birth control/protection: Post-menopausal, Surgical     Comment: Single:         2013 //  DVH/BSO 2021       Family History:  Family History   Problem Relation Age of Onset    Hypertension Father     Colon polyps Father 82        large poly needing surgery    Pulmonary embolism Father     Macular degeneration Mother     Cataracts Mother     Hypertension Mother     Heart disease Mother         at fib and pacemaker    Glaucoma Brother     Hypertension Brother     Colon polyps Brother 58        several advanced colon adenomas    Lung cancer Brother      "Breast cancer Paternal Aunt     Prostate cancer Paternal Uncle     Breast cancer Paternal Cousin     Amblyopia Neg Hx     Blindness Neg Hx     Diabetes Neg Hx     Retinal detachment Neg Hx     Strabismus Neg Hx     Stroke Neg Hx     Thyroid disease Neg Hx     Celiac disease Neg Hx     Cirrhosis Neg Hx     Colon cancer Neg Hx     Esophageal cancer Neg Hx     Inflammatory bowel disease Neg Hx     Liver disease Neg Hx     Rectal cancer Neg Hx     Stomach cancer Neg Hx     Liver cancer Neg Hx     Ulcerative colitis Neg Hx     Ovarian cancer Neg Hx     Cancer Neg Hx     Uterine cancer Neg Hx     Cervical cancer Neg Hx         ROS:  Review of Systems   Musculoskeletal:  Positive for joint pain and myalgias.   Neurological:  Negative for numbness and paresthesias.   All other systems reviewed and are negative.      Vitals:  BP (!) 155/88   Pulse 88   Ht 5' 8" (1.727 m)   LMP 01/01/2013 Comment:   2013 //  DVH/BS  7/2021  BMI 36.49 kg/m²     Physical Examination:  Comprehensive Orthopaedic Musculoskeletal Exam    General      Constitutional: appears stated age, well-developed and well-nourished    Scleral icterus: no    Labored breathing: no    Psychiatric: normal mood and affect and no acute distress    Neurological: alert and oriented x3    Skin: intact    Lymphadenopathy: none     Ortho Exam   Left elbow exam:  No visible deformity  Tenderness over the lateral epicondyle  Pain with resisted wrist extension and finger extension   ROM full    Imaging:  I have ordered and independently reviewed the following imaging studies performed at [facility] today    X-Ray Elbow Complete Left  Narrative: EXAMINATION:  XR ELBOW COMPLETE 3 VIEW LEFT    CLINICAL HISTORY:  . Pain, unspecified    TECHNIQUE:  AP, lateral, and oblique views of the elbow were performed.    COMPARISON:  None    FINDINGS:  Osseous structures appear intact without evidence of fracture or focal osseous destructive lesion.  No evidence of dislocation.  No " advanced degenerative change.  No demonstrable joint effusion.  Impression: No evidence of fracture or dislocation.    Electronically signed by: Eduard Stephen MD  Date:    09/19/2023  Time:    16:02    Assessment:  1. Lateral epicondylitis, left elbow      Plan:  I have reviewed the findings in detail with the patient. Counterforce bracing and therapy will be added. I have also discussed activity modification. She will return if no improvement over time.     Orders Placed This Encounter    HME - OTHER    Ambulatory referral/consult to Physical/Occupational Therapy     No follow-ups on file.

## 2023-09-22 ENCOUNTER — HOSPITAL ENCOUNTER (OUTPATIENT)
Dept: RADIOLOGY | Facility: HOSPITAL | Age: 63
Discharge: HOME | End: 2023-09-22
Attending: OBSTETRICS & GYNECOLOGY
Payer: COMMERCIAL

## 2023-09-22 DIAGNOSIS — Z12.31 OTHER SCREENING MAMMOGRAM: ICD-10-CM

## 2023-09-22 PROCEDURE — 77067 SCR MAMMO BI INCL CAD: CPT | Mod: 26,,, | Performed by: RADIOLOGY

## 2023-09-22 PROCEDURE — 77063 MAMMO DIGITAL SCREENING BILAT WITH TOMO: ICD-10-PCS | Mod: 26,,, | Performed by: RADIOLOGY

## 2023-09-22 PROCEDURE — 77067 MAMMO DIGITAL SCREENING BILAT WITH TOMO: ICD-10-PCS | Mod: 26,,, | Performed by: RADIOLOGY

## 2023-09-22 PROCEDURE — 77067 SCR MAMMO BI INCL CAD: CPT | Mod: TC

## 2023-09-22 PROCEDURE — 77063 BREAST TOMOSYNTHESIS BI: CPT | Mod: 26,,, | Performed by: RADIOLOGY

## 2023-09-26 ENCOUNTER — CLINICAL SUPPORT (OUTPATIENT)
Dept: REHABILITATION | Facility: HOSPITAL | Age: 63
End: 2023-09-26
Attending: ORTHOPAEDIC SURGERY
Payer: COMMERCIAL

## 2023-09-26 DIAGNOSIS — R53.1 WEAKNESS: ICD-10-CM

## 2023-09-26 DIAGNOSIS — Z74.09 STIFFNESS DUE TO IMMOBILITY: ICD-10-CM

## 2023-09-26 DIAGNOSIS — M79.622 PAIN OF LEFT UPPER ARM: ICD-10-CM

## 2023-09-26 DIAGNOSIS — M77.12 LATERAL EPICONDYLITIS, LEFT ELBOW: ICD-10-CM

## 2023-09-26 DIAGNOSIS — M25.60 STIFFNESS DUE TO IMMOBILITY: ICD-10-CM

## 2023-09-26 PROCEDURE — 97165 OT EVAL LOW COMPLEX 30 MIN: CPT | Mod: PN

## 2023-09-26 PROCEDURE — 97140 MANUAL THERAPY 1/> REGIONS: CPT | Mod: PN

## 2023-09-26 NOTE — PLAN OF CARE
"  [facility] Occupational Therapy  Initial Evaluation     Name: Vanessa Olsen  Clinic Number: [id number]    Therapy Diagnosis:   Encounter Diagnoses   Name Primary?    Lateral epicondylitis, left elbow     Pain of left upper arm     Weakness     Stiffness due to immobility      Physician: Freddie Quintero MD    Physician Orders: Eval and Treat  Medical Diagnosis: M77.12 (ICD-10-CM) - Lateral epicondylitis, left elbow  Date of Injury: 2 months ago  Evaluation Date: 9/26/2023  Insurance Authorization Period Expiration: 12/31/2023  Plan of Care Certification Period: 12/22/2023  Date of Return to MD: none scheduled  Visit # / Visits authorized: 1 / 20  FOTO: 1/3    Precautions:  Standard    Time In: 9:15  Time Out: 10:00  Total Appointment Time (timed & untimed codes): 45 minutes    Subjective     History of Current Condition/Mechanism of Injury: Vanessa reports: insidious onset with progressively worsening symptoms left elbow pain    Involved Side: Left  Dominant Side: Right  Imaging: No evidence of fracture or dislocation.    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     Current Functional Status   Home/Living environment: lives alone      Limitation of Functional Status as follows:   ADLs/IADLs:     - Feeding: Independent    - Bathing: Independent    - Dressing/Grooming: Independent    - Home Management: difficulty cooking, lifting pots and pans    - Driving: " Independent     Leisure: unable to garden due to pain    Patient's Goals for Therapy: to decrease pain and increase functional use    Past Medical History/Physical Systems Review:   Vanessa Olsen  has a past medical history of Allergy, Breast disorder, Cataract, Fracture, ribs, H/O cold sores, History of robot-assisted laparoscopic hysterectomy, Hormone replacement therapy (HRT), Menopause, and PONV (postoperative nausea and vomiting).    Vanessa Olsen  has a past surgical history that includes Breast biopsy (Left, 2000); C[place]line tooth extraction; Cholecystectomy (2004); Hysteroscopy (N/A, 4/6/2021); Robot-assisted laparoscopic abdominal hysterectomy using da Jimmy Xi (Bilateral, 7/28/2021); Cystoscopy (N/A, 7/28/2021); Robot-assisted laparoscopic salpingo-oophorectomy (Bilateral, 7/28/2021); Hysterectomy (07/28/2021); Colonoscopy (N/A, 3/5/2018); and Colonoscopy (N/A, 3/27/2023).    Vanessa has a current medication list which includes the following prescription(s): cetirizine, cyclosporine, ergocalciferol (vitamin d2), estradiol, fluocinonide 0.05%, peg 400-propylene glycol, intrarosa, and progesterone.    Review of patient's allergies indicates:  No Known Allergies     Objective     Sensation Test: intact, however reports some tingling in ulnar nerve ditribution    Edema. Measured in centimeters.   9/26/2023 9/26/2023    Left Right   Elbow 30 32   Wrist Crease 16 16   MCPs 18.5 19     Range of Motion/Strength:  Elbow and Wrist ROM. Measured in degrees.   9/26/2023 9/26/2023      Left Right     Elbow Extension/Flexion 20/130 0/135     Supination/Pronation WNL WNL     Wrist Ext/Flex 65/52*pain 75/52     Wrist RD/UD 10/25 20/34        Strength (Dynamometer Rung II) and Pinch Strength (Pinch Gauge)  Measured in pounds.   9/26/2023 9/26/2023    Left Right   Elbow Flexed 20 60   Elbow Extended 10 60   Key Pinch 12 21   3pt Pinch 8 20   2pt Pinch 10 12     Special Tests: left   - Cozen's (lateral  epicondylitis) test: +   - Mill's (lateral epicondylitis) test: +   - Extensor Digitorum (lateral epicondylitis) test: +   - Medial epicondylitis test: -    Intake Outcome Measure for FOTO Elbow Survey    Therapist reviewed FOTO scores for Vanessa Olsen on 9/26/2023.   FOTO documents entered into [facility] - see Media section.    Intake Score: 41%         Treatment     Total Treatment time (time-based codes) separate from Evaluation: 10 minutes    Vanessa received the following manual therapy techniques for 8 minutes:   -ASTYM left elbow, wrist and hand    Vanessa received therapeutic activities for 2 minutes including:  -ASTYM stretches 1-3 30 seconds    Patient Education and Home Exercises      Education provided:   -role of OT, goals for OT, scheduling/cancellations, insurance limitations with patient.  -Additional Education provided: on current condition, home exercise program     Written Home Exercises Provided: yes.  Exercises were reviewed and Vanessa was able to demonstrate them prior to the end of the session.    Vanessa demonstrated good  understanding of the education provided.     Pt was advised to perform these exercises free of pain, and to stop performing them if pain occurs.    See EMR under Patient Instructions for exercises provided 9/26/2023.    Assessment     Vanessa Olsen is a 63 y.o. female referred to outpatient occupational therapy and presents with a medical diagnosis of Left lateral epicondylitis. Pt presents to clinic today with reports of insidious onset of pain about 2 months ago. Pt with decreased range of motion, weakness and pain all of which limit her functionally. Pt with the following problem list:      Assessment of Occupational Performance   Performance Deficits    Physical:  Joint Mobility  Muscle Power/Strength  Muscle Endurance  Edema   Strength  Pinch Strength  Muscle Tone  Postural Control  Pain    Cognitive:  No Deficits    Psychosocial:    No Deficits      Following medical record review it is determined that pt will benefit from occupational therapy services in order to maximize pain free and/or functional use of left elbow. The following goals were discussed with the patient and patient is in agreement with them as to be addressed in the treatment plan. The patient's rehab potential is Good.     Anticipated barriers to occupational therapy: none    Plan of care discussed with patient: Yes  Patient's spiritual, cultural and educational needs considered and patient is agreeable to the plan of care and goals as stated below:     Medical Necessity is demonstrated by the following  Occupational Profile/History  Co-morbidities and personal factors that may impact the plan of care [x] LOW: Brief chart review  [] MODERATE: Expanded chart review   [] HIGH: Extensive chart review    Moderate / High Support Documentation: N/A     Examination  Performance deficits relating to physical, cognitive or psychosocial skills that result in activity limitations and/or participation restrictions  [x] LOW: addressing 1-3 Performance deficits  [] MODERATE: 3-5 Performance deficits  [] HIGH: 5+ Performance deficits (please support below)    Moderate / High Support Documentation: N/A     Treatment Options [x] LOW: Limited options  [] MODERATE: Several options  [] HIGH: Multiple options      Decision Making/ Complexity Score: low       The following goals were discussed with the patient and patient is in agreement with them as to be addressed in the treatment plan.     Short term Goals:  1) Initiate HEP  2) Pt will increase AROM of Left wrist by 10 degrees in order to assist with work tasks by 4 weeks.  3) Pt will reduce edema by .5 cm in affected Left elbow by 4 weeks.  4) Pt will reduce pain to less than 4/10 by 4 weeks.  5) Pt will increase functional  strength by 5# in order to A in opening containers for med management or home management tasks by 4 weeks.   6) Patient will be  able to achieve less than or equal to 30% on the FOTO, demonstrating overall improved functional ability with upper extremity. (Self-care category)    Long Term Goals:  Goals to be met by discharge:  1) Independent with HEP  2) Pt will increase Left upper extremity range of motion to within functional limits for improved performance with with home management or work related tasks by d/c.   3) Pt will decrease edema to trace or none to increase functional ROM by d/c.   4) Pt will decrease pain to trace or none while completing light home management tasks or work related tasks by d/c.   5) Patient will be able to achieve less than or equal to 15% on the FOTO, demonstrating overall improved functional ability with upper extremity.  (Self-care category)  6) 5) Pt will increase functional  strength by 15# in order to Assist in opening containers for med management or home management tasks by 4 discharge    Plan     Certification Period/Plan of care expiration: 9/26/2023 to 12/22/2023.    Outpatient Occupational Therapy 1 times weekly for 12 weeks to include the following interventions: Manual therapy/joint mobilizations, Modalities for pain management, Therapeutic exercises/activities., Strengthening, Orthotic Fabrication/Fit/Training, Edema Control, Joint Protection, and Energy Conservation.    SALLIE Streeter    I certify the need for these services furnished under this plan of treatment and while under my care.        Freddie Quintero MD, FAAOS  , Orthopaedic Sports Medicine  Residency   [facility] Department of Orthopaedic Surgery  Assistant Orthopaedic Surgeon, [place] [facility]  Head Team Physician, [place] [facility]

## 2023-10-04 NOTE — PROGRESS NOTES
"[facility]   Occupational Therapy Treatment Note     Date: 10/5/2023  Name: Vanessa Olsen  Clinic Number: [id number]    Therapy Diagnosis:   Encounter Diagnoses   Name Primary?    Pain of left upper arm Yes    Weakness     Stiffness due to immobility      Physician: Freddie Quintero MD    Physician Orders: Eval and Treat  Medical Diagnosis: M77.12 (ICD-10-CM) - Lateral epicondylitis, left elbow  Date of Injury: 2 months ago  Evaluation Date: 9/26/2023  Insurance Authorization Period Expiration: 12/31/2023  Plan of Care Certification Period: 12/22/2023  Date of Return to MD: none scheduled  Visit # / Visits authorized: 2 / 20  FOTO: 1/3     Precautions:  Standard     Time In: 8:30  Time Out: 9:15  Total Appointment Time (timed & untimed codes): 45 minutes  Subjective     Pt reports: "I feel like my hand is better than last week, my elbow is still bothering me some."  she was compliant with home exercise program given last session.   Response to previous treatment:low to moderate pain  Functional change: tolerated progression of treatment well    Pain: 4/10  Location: left arm     Objective     Objective Measures updated at progress report unless specified.   Sensation Test: intact, however reports some tingling in ulnar nerve ditribution     Edema. Measured in centimeters.    9/26/2023 9/26/2023     Left Right   Elbow 30 32   Wrist Crease 16 16   MCPs 18.5 19      Range of Motion/Strength:  Elbow and Wrist ROM. Measured in degrees.    9/26/2023 9/26/2023         Left Right       Elbow Extension/Flexion 20/130 0/135       Supination/Pronation WNL WNL       Wrist Ext/Flex 65/52*pain 75/52       Wrist RD/UD 10/25 20/34           Strength (Dynamometer Rung II) and Pinch Strength (Pinch Gauge)  Measured in pounds.    9/26/2023 9/26/2023     Left Right   Elbow Flexed 20 60   Elbow Extended 10 60   Key Pinch 12 21   3pt Pinch 8 20   2pt Pinch 10 12      Special Tests: left             " " - Cozen's (lateral epicondylitis) test: +              - Mill's (lateral epicondylitis) test: +              - Extensor Digitorum (lateral epicondylitis) test: +              - Medial epicondylitis test: -     Intake Outcome Measure for FOTO Elbow Survey     Therapist reviewed FOTO scores for Vanessa Olsen on 9/26/2023.   FOTO documents entered into [facility] - see Media section.     Intake Score: 41%         Treatment     Vanessa received the treatments listed below:     Supervised modalities after being cleared for contradictions: Hot Pack - x 5 minutes for pain management and tissue extensibility    Manual therapy techniques: Soft tissue Mobilization were applied to the: left elbow, wrist and hand for 10 minutes, including:  -ASTYM    Therapeutic exercises to develop ROM for 20 minutes, including:  -ASTYM stretches 1-3 3/30"  -wrist flexion/extension 1# 2/15  -hammer swings 2/15  -radial nerve glides 10x    Neuromuscular re-education activities to improve Posture for 10 minutes. The following activities were included:  -Red band external rotation 2/15  -Red band horizontal abduction 2/15    Patient Education and Home Exercises     Education provided:   - on current condition and home exercise program   - Progress towards goals     Written Home Exercises Provided: Patient instructed to cont prior HEP.  Exercises were reviewed and Vanessa was able to demonstrate them prior to the end of the session.  Vanessa demonstrated good  understanding of the HEP provided.   .   See EMR under Patient Instructions for exercises provided prior visit.      Assessment    Pt with good participation this date. Pain report moderate this date however did not limit her in session. Signs and symptoms of fibrotic tissue noted during ASTYM however responded well to treatment. Pt tolerated progression of treatment well. Able to complete all with good technique and without c/o. Pt very motivated.     Vanessa is progressing well towards " her goals and there are no updates to goals at this time. Pt prognosis is Good.     Pt will continue to benefit from skilled outpatient occupational therapy to address the deficits listed in the problem list on initial evaluation provide pt/family education and to maximize pt's level of independence in the home and community environment.     Anticipated barriers to occupational therapy: none    Pt's spiritual, cultural and educational needs considered and pt agreeable to plan of care and goals.    Goals:  Short term Goals:  1) Initiate home exercise program -met, ongoing  2) Pt will increase AROM of Left wrist by 10 degrees in order to assist with work tasks by 4 weeks. -Not met, progressing  3) Pt will reduce edema by .5 cm in affected Left elbow by 4 weeks. -Not met, progressing  4) Pt will reduce pain to less than 4/10 by 4 weeks. -Not met, progressing  5) Pt will increase functional  strength by 5# in order to A in opening containers for med management or home management tasks by 4 weeks. -Not met, progressing  6) Patient will be able to achieve less than or equal to 30% on the FOTO, demonstrating overall improved functional ability with upper extremity. (Self-care category) -Not met, progressing     Long Term Goals:  Goals to be met by discharge:  1) Independent with home exercise program -Not met, progressing  2) Pt will increase Left upper extremity range of motion to within functional limits for improved performance with with home management or work related tasks by d/c. -Not met, progressing  3) Pt will decrease edema to trace or none to increase functional ROM by d/c. -Not met, progressing  4) Pt will decrease pain to trace or none while completing light home management tasks or work related tasks by d/c. -Not met, progressing  5) Patient will be able to achieve less than or equal to 15% on the FOTO, demonstrating overall improved functional ability with upper extremity.  (Self-care category) -Not met,  progressing  6) 5) Pt will increase functional  strength by 15# in order to Assist in opening containers for med management or home management tasks by discharge -Not met, progressing     Plan     Continue with OT plan of care   Updates/Grading for next session: progress as able    SALLIE Streeter

## 2023-10-05 ENCOUNTER — CLINICAL SUPPORT (OUTPATIENT)
Dept: REHABILITATION | Facility: HOSPITAL | Age: 63
End: 2023-10-05
Payer: COMMERCIAL

## 2023-10-05 DIAGNOSIS — Z74.09 STIFFNESS DUE TO IMMOBILITY: ICD-10-CM

## 2023-10-05 DIAGNOSIS — M25.60 STIFFNESS DUE TO IMMOBILITY: ICD-10-CM

## 2023-10-05 DIAGNOSIS — M79.622 PAIN OF LEFT UPPER ARM: Primary | ICD-10-CM

## 2023-10-05 DIAGNOSIS — R53.1 WEAKNESS: ICD-10-CM

## 2023-10-05 PROCEDURE — 97530 THERAPEUTIC ACTIVITIES: CPT | Mod: PN

## 2023-10-05 PROCEDURE — 97110 THERAPEUTIC EXERCISES: CPT | Mod: PN

## 2023-10-05 PROCEDURE — 97112 NEUROMUSCULAR REEDUCATION: CPT | Mod: PN

## 2023-10-11 NOTE — PROGRESS NOTES
"[facility]   Occupational Therapy Treatment Note     Date: 10/12/2023  Name: Vanessa Olsen  Clinic Number: [id number]    Therapy Diagnosis:   Encounter Diagnoses   Name Primary?    Pain of left upper arm Yes    Weakness     Stiffness due to immobility      Physician: Freddie Quintero MD    Physician Orders: Eval and Treat  Medical Diagnosis: M77.12 (ICD-10-CM) - Lateral epicondylitis, left elbow  Date of Injury: 2 months ago  Evaluation Date: 9/26/2023  Insurance Authorization Period Expiration: 12/31/2023  Plan of Care Certification Period: 12/22/2023  Date of Return to MD: none scheduled  Visit # / Visits authorized: 3/ 20  FOTO: 1/3     Precautions:  Standard     Time In: 8:30  Time Out: 9:15  Total Appointment Time (timed & untimed codes): 45 minutes  Subjective     Pt reports: "I was sore after last time."  she was compliant with home exercise program given last session.   Response to previous treatment:increased pain  Functional change: tolerated progression of treatment well    Pain: 5/10  Location: left arm     Objective     Objective Measures updated at progress report unless specified.   Sensation Test: intact, however reports some tingling in ulnar nerve ditribution     Edema. Measured in centimeters.    9/26/2023 9/26/2023     Left Right   Elbow 30 32   Wrist Crease 16 16   MCPs 18.5 19      Range of Motion/Strength:  Elbow and Wrist ROM. Measured in degrees.    9/26/2023 9/26/2023         Left Right       Elbow Extension/Flexion 20/130 0/135       Supination/Pronation WNL WNL       Wrist Ext/Flex 65/52*pain 75/52       Wrist RD/UD 10/25 20/34           Strength (Dynamometer Rung II) and Pinch Strength (Pinch Gauge)  Measured in pounds.    9/26/2023 9/26/2023     Left Right   Elbow Flexed 20 60   Elbow Extended 10 60   Key Pinch 12 21   3pt Pinch 8 20   2pt Pinch 10 12      Special Tests: left              - Cozen's (lateral epicondylitis) test: +              - " "Mill's (lateral epicondylitis) test: +              - Extensor Digitorum (lateral epicondylitis) test: +              - Medial epicondylitis test: -     Intake Outcome Measure for FOTO Elbow Survey     Therapist reviewed FOTO scores for Vanessa Olsen on 9/26/2023.   FOTO documents entered into [facility] - see Media section.     Intake Score: 41%         Treatment     Vanessa received the treatments listed below:     Supervised modalities after being cleared for contradictions: Hot Pack - x 5 minutes for pain management and tissue extensibility    Manual therapy techniques: Soft tissue Mobilization were applied to the: left elbow, wrist and hand for 10 minutes, including:  -ASTYM    Therapeutic exercises to develop ROM for 20 minutes, including:  -ASTYM stretches 1-3 3/30"  -wrist flexion/extension 1# 2/15  -hammer swings 2/15  -radial nerve glides 10x  -pom pom pickup green clothespin 1 container  -pom pom pickup brown hand gripper 1st setting    Neuromuscular re-education activities to improve Posture for 10 minutes. The following activities were included:  -Red band external rotation 2/15  -Red band horizontal abduction 2/15    Patient Education and Home Exercises     Education provided:   - on current condition and home exercise program   - Progress towards goals     Written Home Exercises Provided: Patient instructed to cont prior HEP.  Exercises were reviewed and Vanessa was able to demonstrate them prior to the end of the session.  Vanessa demonstrated good  understanding of the HEP provided.   .   See EMR under Patient Instructions for exercises provided prior visit.      Assessment   Pt with good participation this date. Pain report moderate this date however did not limit her in session. Signs and symptoms of fibrotic tissue noted during ASTYM however responded well to treatment. Pt tolerated progression of treatment well. Able to complete all with good technique and without c/o. Pt very motivated. "     Vanessa is progressing well towards her goals and there are no updates to goals at this time. Pt prognosis is Good.     Pt will continue to benefit from skilled outpatient occupational therapy to address the deficits listed in the problem list on initial evaluation provide pt/family education and to maximize pt's level of independence in the home and community environment.     Anticipated barriers to occupational therapy: none    Pt's spiritual, cultural and educational needs considered and pt agreeable to plan of care and goals.    Goals:  Short term Goals:  1) Initiate home exercise program -met, ongoing  2) Pt will increase AROM of Left wrist by 10 degrees in order to assist with work tasks by 4 weeks. -Not met, progressing  3) Pt will reduce edema by .5 cm in affected Left elbow by 4 weeks. -Not met, progressing  4) Pt will reduce pain to less than 4/10 by 4 weeks. -Not met, progressing  5) Pt will increase functional  strength by 5# in order to A in opening containers for med management or home management tasks by 4 weeks. -Not met, progressing  6) Patient will be able to achieve less than or equal to 30% on the FOTO, demonstrating overall improved functional ability with upper extremity. (Self-care category) -Not met, progressing     Long Term Goals:  Goals to be met by discharge:  1) Independent with home exercise program -Not met, progressing  2) Pt will increase Left upper extremity range of motion to within functional limits for improved performance with with home management or work related tasks by d/c. -Not met, progressing  3) Pt will decrease edema to trace or none to increase functional ROM by d/c. -Not met, progressing  4) Pt will decrease pain to trace or none while completing light home management tasks or work related tasks by d/c. -Not met, progressing  5) Patient will be able to achieve less than or equal to 15% on the FOTO, demonstrating overall improved functional ability with upper  extremity.  (Self-care category) -Not met, progressing  6) 5) Pt will increase functional  strength by 15# in order to Assist in opening containers for med management or home management tasks by discharge -Not met, progressing     Plan     Continue with OT plan of care   Updates/Grading for next session: progress as able    SALLIE Streeter

## 2023-10-12 ENCOUNTER — CLINICAL SUPPORT (OUTPATIENT)
Dept: REHABILITATION | Facility: HOSPITAL | Age: 63
End: 2023-10-12
Payer: COMMERCIAL

## 2023-10-12 DIAGNOSIS — M79.622 PAIN OF LEFT UPPER ARM: Primary | ICD-10-CM

## 2023-10-12 DIAGNOSIS — M25.60 STIFFNESS DUE TO IMMOBILITY: ICD-10-CM

## 2023-10-12 DIAGNOSIS — R53.1 WEAKNESS: ICD-10-CM

## 2023-10-12 DIAGNOSIS — Z74.09 STIFFNESS DUE TO IMMOBILITY: ICD-10-CM

## 2023-10-12 PROCEDURE — 97112 NEUROMUSCULAR REEDUCATION: CPT | Mod: PN

## 2023-10-12 PROCEDURE — 97110 THERAPEUTIC EXERCISES: CPT | Mod: PN

## 2023-10-12 PROCEDURE — 97140 MANUAL THERAPY 1/> REGIONS: CPT | Mod: PN

## 2023-10-18 NOTE — PROGRESS NOTES
"[facility]   Occupational Therapy Treatment Note     Date: 10/19/2023  Name: Vanessa Olsen  Clinic Number: [id number]  2  Therapy Diagnosis:   Encounter Diagnoses   Name Primary?    Pain of left upper arm Yes    Weakness     Stiffness due to immobility      Physician: Freddie Quintero MD    Physician Orders: Eval and Treat  Medical Diagnosis: M77.12 (ICD-10-CM) - Lateral epicondylitis, left elbow  Date of Injury: 2 months ago  Evaluation Date: 9/26/2023  Insurance Authorization Period Expiration: 12/31/2023  Plan of Care Certification Period: 12/22/2023  Date of Return to MD: none scheduled  Visit # / Visits authorized: 3/ 20  FOTO: 1/3     Precautions:  Standard     Time In: 8:30  Time Out: 9:15  Total Appointment Time (timed & untimed codes): 45 minutes  Subjective     Pt reports: "I feel like the pain is getting more acute."  she was compliant with home exercise program given last session.   Response to previous treatment:decreased pain  Functional change: improved range of motion, strength and FOTO    Pain: 4/10  Location: Left lateral epicondyle    Objective     Objective Measures updated at progress report unless specified.   Sensation Test: intact, however reports some tingling in ulnar nerve ditribution     Edema. Measured in centimeters.    9/26/2023 9/26/2023     Left Right   Elbow 30 32   Wrist Crease 16 16   MCPs 18.5 19      Range of Motion/Strength:  Elbow and Wrist ROM. Measured in degrees.    9/26/2023 9/26/2023  10/19/2023       Left Right  Left     Elbow Extension/Flexion 20/130 0/135 5/135     Supination/Pronation WNL WNL WNL      Wrist Ext/Flex 65/52*pain 75/52 75/60     Wrist RD/UD 10/25 20/34 20/30         Strength (Dynamometer Rung II) and Pinch Strength (Pinch Gauge)  Measured in pounds.    9/26/2023 9/26/2023 10/19/2023     Left Right Left   Elbow Flexed 20 60 40   Elbow Extended 10 60 30   Key Pinch 12 21 16   3pt Pinch 8 20 9   2pt Pinch 10 12 8    " "  Special Tests: left              - Cozen's (lateral epicondylitis) test: +              - Mill's (lateral epicondylitis) test: +              - Extensor Digitorum (lateral epicondylitis) test: +              - Medial epicondylitis test: -     Intake Outcome Measure for FOTO Elbow Survey     Therapist reviewed FOTO scores for Vanessa Olsen on 9/26/2023.   FOTO documents entered into [facility] - see Media section.     Intake Score: 41%  4th visit:39%         Treatment     Vanessa received the treatments listed below:     Supervised modalities after being cleared for contradictions: Hot Pack - x 5 minutes for pain management and tissue extensibility    Manual therapy techniques: Soft tissue Mobilization were applied to the: left elbow, wrist and hand for 10 minutes, including:  -ASTYM    Therapeutic exercises to develop ROM for 20 minutes, including:  -ASTYM stretches 1-3 3/30"  -wrist flexion/extension eccentric strengthening 2# 2/10  -hammer swings 2/15  -radial nerve glides 10x  -pom pom pickup green clothespin 1 container  -pom pom pickup brown hand gripper 1st setting    Neuromuscular re-education activities to improve Posture for 10 minutes. The following activities were included:  -Red band external rotation 2/15  -Red band horizontal abduction 2/15    Patient Education and Home Exercises     Education provided:   - on current condition and home exercise program   - Progress towards goals     Written Home Exercises Provided: Patient instructed to cont prior HEP.  Exercises were reviewed and Vanessa was able to demonstrate them prior to the end of the session.  Vanessa demonstrated good  understanding of the HEP provided.   .   See EMR under Patient Instructions for exercises provided prior visit.      Assessment   Pt with good participation this date. Pain report moderate remains the same as previous session however did not limit her with progression of treatment and pt reporting becoming more acute. Signs " and symptoms of fibrotic tissue noted during ASTYM however responded well to treatment. Pt with overall improved range of motion, strength and FOTO score this date.     Vanessa is progressing well towards her goals and there are no updates to goals at this time. Pt prognosis is Good.     Pt will continue to benefit from skilled outpatient occupational therapy to address the deficits listed in the problem list on initial evaluation provide pt/family education and to maximize pt's level of independence in the home and community environment.     Anticipated barriers to occupational therapy: none    Pt's spiritual, cultural and educational needs considered and pt agreeable to plan of care and goals.    Goals:  Short term Goals:  1) Initiate home exercise program -met, ongoing  2) Pt will increase AROM of Left wrist by 10 degrees in order to assist with work tasks by 4 weeks. -Not met, progressing  3) Pt will reduce edema by .5 cm in affected Left elbow by 4 weeks. -Not met, progressing  4) Pt will reduce pain to less than 4/10 by 4 weeks. -Not met, progressing  5) Pt will increase functional  strength by 5# in order to A in opening containers for med management or home management tasks by 4 weeks. -Not met, progressing  6) Patient will be able to achieve less than or equal to 30% on the FOTO, demonstrating overall improved functional ability with upper extremity. (Self-care category) -Not met, progressing     Long Term Goals:  Goals to be met by discharge:  1) Independent with home exercise program -Not met, progressing  2) Pt will increase Left upper extremity range of motion to within functional limits for improved performance with with home management or work related tasks by d/c. -Not met, progressing  3) Pt will decrease edema to trace or none to increase functional ROM by d/c. -Not met, progressing  4) Pt will decrease pain to trace or none while completing light home management tasks or work related tasks by  d/c. -Not met, progressing  5) Patient will be able to achieve less than or equal to 15% on the FOTO, demonstrating overall improved functional ability with upper extremity.  (Self-care category) -Not met, progressing  6) 5) Pt will increase functional  strength by 15# in order to Assist in opening containers for med management or home management tasks by discharge -Not met, progressing     Plan     Continue with OT plan of care   Updates/Grading for next session: progress as able    SALLIE Streeter

## 2023-10-19 ENCOUNTER — CLINICAL SUPPORT (OUTPATIENT)
Dept: REHABILITATION | Facility: HOSPITAL | Age: 63
End: 2023-10-19
Payer: COMMERCIAL

## 2023-10-19 DIAGNOSIS — M25.60 STIFFNESS DUE TO IMMOBILITY: ICD-10-CM

## 2023-10-19 DIAGNOSIS — Z74.09 STIFFNESS DUE TO IMMOBILITY: ICD-10-CM

## 2023-10-19 DIAGNOSIS — R53.1 WEAKNESS: ICD-10-CM

## 2023-10-19 DIAGNOSIS — M79.622 PAIN OF LEFT UPPER ARM: Primary | ICD-10-CM

## 2023-10-19 PROCEDURE — 97112 NEUROMUSCULAR REEDUCATION: CPT | Mod: PN

## 2023-10-19 PROCEDURE — 97140 MANUAL THERAPY 1/> REGIONS: CPT | Mod: PN

## 2023-10-19 PROCEDURE — 97110 THERAPEUTIC EXERCISES: CPT | Mod: PN

## 2023-10-25 NOTE — PROGRESS NOTES
"[facility]   Occupational Therapy Treatment Note     Date: 10/26/2023  Name: Vanessa Olsen  Clinic Number: [id number]    Therapy Diagnosis:   Encounter Diagnoses   Name Primary?    Pain of left upper arm Yes    Weakness     Stiffness due to immobility      Physician: Freddie Quintero MD    Physician Orders: Eval and Treat  Medical Diagnosis: M77.12 (ICD-10-CM) - Lateral epicondylitis, left elbow  Date of Injury: 2 months ago  Evaluation Date: 9/26/2023  Insurance Authorization Period Expiration: 12/31/2023  Plan of Care Certification Period: 12/22/2023  Date of Return to MD: none scheduled  Visit # / Visits authorized: 5/ 20  FOTO: 2/3     Precautions:  Standard     Time In: 8:30  Time Out: 9:15  Total Appointment Time (timed & untimed codes): 45 minutes  Subjective     Pt reports: "I feel like I'm moving better and my pain is getting less."  she was compliant with home exercise program given last session.   Response to previous treatment:decreased pain  Functional change:good endurance with exercises    Pain: 2/10  Location: Left lateral epicondyle    Objective     Objective Measures updated at progress report unless specified.   Sensation Test: intact, however reports some tingling in ulnar nerve ditribution     Edema. Measured in centimeters.    9/26/2023 9/26/2023     Left Right   Elbow 30 32   Wrist Crease 16 16   MCPs 18.5 19      Range of Motion/Strength:  Elbow and Wrist ROM. Measured in degrees.    9/26/2023 9/26/2023  10/19/2023       Left Right  Left     Elbow Extension/Flexion 20/130 0/135 5/135     Supination/Pronation WNL WNL WNL      Wrist Ext/Flex 65/52*pain 75/52 75/60     Wrist RD/UD 10/25 20/34 20/30         Strength (Dynamometer Rung II) and Pinch Strength (Pinch Gauge)  Measured in pounds.    9/26/2023 9/26/2023 10/19/2023     Left Right Left   Elbow Flexed 20 60 40   Elbow Extended 10 60 30   Key Pinch 12 21 16   3pt Pinch 8 20 9   2pt Pinch 10 12 8    " "  Special Tests: left              - Cozen's (lateral epicondylitis) test: +              - Mill's (lateral epicondylitis) test: +              - Extensor Digitorum (lateral epicondylitis) test: +              - Medial epicondylitis test: -     Intake Outcome Measure for FOTO Elbow Survey     Therapist reviewed FOTO scores for Vanessa Olsen on 9/26/2023.   FOTO documents entered into [facility] - see Media section.     Intake Score: 41%  4th visit:39%         Treatment     Vanessa received the treatments listed below:     Supervised modalities after being cleared for contradictions: Hot Pack - x 5 minutes for pain management and tissue extensibility    Manual therapy techniques: Soft tissue Mobilization were applied to the: left elbow, wrist and hand for 10 minutes, including:  -ASTYM    Therapeutic exercises to develop ROM for 20 minutes, including:  -ASTYM stretches 1-3 3/30"  -wrist flexion/extension eccentric strengthening 3# 2/15  -elbow flexion/extension 3# 2/15  -hammer swings 2/15  -radial nerve glides 10x  -corner pectoralis stretch 2/30"  -threading the needle 10x     Neuromuscular re-education activities to improve Posture for 10 minutes. The following activities were included:  -Green band external rotation 2/15  -Green band horizontal abduction 2/15    Patient Education and Home Exercises     Education provided:   - on current condition and home exercise program   - Progress towards goals     Written Home Exercises Provided: Patient instructed to cont prior HEP.  Exercises were reviewed and Vanessa was able to demonstrate them prior to the end of the session.  Vanessa demonstrated good  understanding of the HEP provided.     See EMR under Patient Instructions for exercises provided prior visit.      Assessment   Pt with good participation this date. Pain report decreased from previous session.  Signs and symptoms of fibrotic tissue noted during ASTYM however responded well to treatment. Good endurance " with exercises.     Vanessa is progressing well towards her goals and there are no updates to goals at this time. Pt prognosis is Good.     Pt will continue to benefit from skilled outpatient occupational therapy to address the deficits listed in the problem list on initial evaluation provide pt/family education and to maximize pt's level of independence in the home and community environment.     Anticipated barriers to occupational therapy: none    Pt's spiritual, cultural and educational needs considered and pt agreeable to plan of care and goals.    Goals:  Short term Goals:  1) Initiate home exercise program -met, ongoing  2) Pt will increase AROM of Left wrist by 10 degrees in order to assist with work tasks by 4 weeks. -Not met, progressing  3) Pt will reduce edema by .5 cm in affected Left elbow by 4 weeks. -Not met, progressing  4) Pt will reduce pain to less than 4/10 by 4 weeks. -Not met, progressing  5) Pt will increase functional  strength by 5# in order to A in opening containers for med management or home management tasks by 4 weeks. -Not met, progressing  6) Patient will be able to achieve less than or equal to 30% on the FOTO, demonstrating overall improved functional ability with upper extremity. (Self-care category) -Not met, progressing     Long Term Goals:  Goals to be met by discharge:  1) Independent with home exercise program -Not met, progressing  2) Pt will increase Left upper extremity range of motion to within functional limits for improved performance with with home management or work related tasks by d/c. -Not met, progressing  3) Pt will decrease edema to trace or none to increase functional ROM by d/c. -Not met, progressing  4) Pt will decrease pain to trace or none while completing light home management tasks or work related tasks by d/c. -Not met, progressing  5) Patient will be able to achieve less than or equal to 15% on the FOTO, demonstrating overall improved functional  ability with upper extremity.  (Self-care category) -Not met, progressing  6) 5) Pt will increase functional  strength by 15# in order to Assist in opening containers for med management or home management tasks by discharge -Not met, progressing     Plan     Continue with OT plan of care   Updates/Grading for next session: progress as able    SALLIE Streeter

## 2023-10-26 ENCOUNTER — CLINICAL SUPPORT (OUTPATIENT)
Dept: REHABILITATION | Facility: HOSPITAL | Age: 63
End: 2023-10-26
Payer: COMMERCIAL

## 2023-10-26 DIAGNOSIS — Z74.09 STIFFNESS DUE TO IMMOBILITY: ICD-10-CM

## 2023-10-26 DIAGNOSIS — M25.60 STIFFNESS DUE TO IMMOBILITY: ICD-10-CM

## 2023-10-26 DIAGNOSIS — R53.1 WEAKNESS: ICD-10-CM

## 2023-10-26 DIAGNOSIS — M79.622 PAIN OF LEFT UPPER ARM: Primary | ICD-10-CM

## 2023-10-26 PROCEDURE — 97112 NEUROMUSCULAR REEDUCATION: CPT | Mod: PN

## 2023-10-26 PROCEDURE — 97110 THERAPEUTIC EXERCISES: CPT | Mod: PN

## 2023-10-26 PROCEDURE — 97140 MANUAL THERAPY 1/> REGIONS: CPT | Mod: PN

## 2023-11-01 NOTE — PROGRESS NOTES
"[facility]   Occupational Therapy Treatment Note     Date: 11/2/2023  Name: Vanessa Olsen  Clinic Number: [id number]    Therapy Diagnosis:   Encounter Diagnoses   Name Primary?    Pain of left upper arm Yes    Weakness     Stiffness due to immobility      Physician: Freddie Quintero MD    Physician Orders: Eval and Treat  Medical Diagnosis: M77.12 (ICD-10-CM) - Lateral epicondylitis, left elbow  Date of Injury: 2 months ago  Evaluation Date: 9/26/2023  Insurance Authorization Period Expiration: 12/31/2023  Plan of Care Certification Period: 12/22/2023  Date of Return to MD: none scheduled  Visit # / Visits authorized: 5(+1)/ 20  FOTO: 2/3     Precautions:  Standard     Time In: 8:30  Time Out: 9:15  Total Appointment Time (timed & untimed codes): 45 minutes  Subjective     Pt reports: "on Tuesday it bothered me all day."   she was compliant with home exercise program given last session.   Response to previous treatment: same pain  Functional change:good endurance with exercises    Pain: 2/10  Location: Left lateral epicondyle    Objective     Objective Measures updated at progress report unless specified.   Sensation Test: intact, however reports some tingling in ulnar nerve ditribution     Edema. Measured in centimeters.    9/26/2023 9/26/2023     Left Right   Elbow 30 32   Wrist Crease 16 16   MCPs 18.5 19      Range of Motion/Strength:  Elbow and Wrist ROM. Measured in degrees.    9/26/2023 9/26/2023  10/19/2023       Left Right  Left     Elbow Extension/Flexion 20/130 0/135 5/135     Supination/Pronation WNL WNL WNL      Wrist Ext/Flex 65/52*pain 75/52 75/60     Wrist RD/UD 10/25 20/34 20/30         Strength (Dynamometer Rung II) and Pinch Strength (Pinch Gauge)  Measured in pounds.    9/26/2023 9/26/2023 10/19/2023     Left Right Left   Elbow Flexed 20 60 40   Elbow Extended 10 60 30   Key Pinch 12 21 16   3pt Pinch 8 20 9   2pt Pinch 10 12 8      Special Tests: left      " "        - Cozen's (lateral epicondylitis) test: +              - Mill's (lateral epicondylitis) test: +              - Extensor Digitorum (lateral epicondylitis) test: +              - Medial epicondylitis test: -     Intake Outcome Measure for FOTO Elbow Survey     Therapist reviewed FOTO scores for Vanessa Olsen on 9/26/2023.   FOTO documents entered into [facility] - see Media section.     Intake Score: 41%  4th visit:39%         Treatment     Vanessa received the treatments listed below:     Supervised modalities after being cleared for contradictions: Hot Pack - x 5 minutes for pain management and tissue extensibility    Manual therapy techniques: Soft tissue Mobilization were applied to the: left elbow, wrist and hand for 10 minutes, including:  -ASTYM    Therapeutic exercises to develop ROM for 20 minutes, including:  -ASTYM stretches 1-3 3/30"  -wrist flexion/extension eccentric strengthening 3# 2/15  -elbow flexion/extension 3# 2/15  -hammer swings 2/15  -radial nerve glides 10x  -corner pectoralis stretch 2/30"  -threading the needle 10x     Neuromuscular re-education activities to improve Posture for 10 minutes. The following activities were included:  -Green band external rotation 2/15  -Green band horizontal abduction 2/15    Patient Education and Home Exercises     Education provided:   - on current condition and home exercise program   - Progress towards goals     Written Home Exercises Provided: Patient instructed to cont prior HEP.  Exercises were reviewed and Vanessa was able to demonstrate them prior to the end of the session.  Vanessa demonstrated good  understanding of the HEP provided.     See EMR under Patient Instructions for exercises provided prior visit.      Assessment   Pt with good participation this date. Pain report remains the same as previous session however is low. Signs and symptoms of fibrotic tissue noted during ASTYM however responded well to treatment. Good endurance with " exercises.     Vanessa is progressing well towards her goals and there are no updates to goals at this time. Pt prognosis is Good.     Pt will continue to benefit from skilled outpatient occupational therapy to address the deficits listed in the problem list on initial evaluation provide pt/family education and to maximize pt's level of independence in the home and community environment.     Anticipated barriers to occupational therapy: none    Pt's spiritual, cultural and educational needs considered and pt agreeable to plan of care and goals.    Goals:  Short term Goals:  1) Initiate home exercise program -met, ongoing  2) Pt will increase AROM of Left wrist by 10 degrees in order to assist with work tasks by 4 weeks. -met  3) Pt will reduce edema by .5 cm in affected Left elbow by 4 weeks. -met  4) Pt will reduce pain to less than 4/10 by 4 weeks. -met  5) Pt will increase functional  strength by 5# in order to A in opening containers for med management or home management tasks by 4 weeks. -met  6) Patient will be able to achieve less than or equal to 30% on the FOTO, demonstrating overall improved functional ability with upper extremity. (Self-care category) -Not met, progressing     Long Term Goals:  Goals to be met by discharge:  1) Independent with home exercise program -Not met, progressing  2) Pt will increase Left upper extremity range of motion to within functional limits for improved performance with with home management or work related tasks by d/c. -Not met, progressing  3) Pt will decrease edema to trace or none to increase functional ROM by d/c. -Not met, progressing  4) Pt will decrease pain to trace or none while completing light home management tasks or work related tasks by d/c. -Not met, progressing  5) Patient will be able to achieve less than or equal to 15% on the FOTO, demonstrating overall improved functional ability with upper extremity.  (Self-care category) -Not met, progressing  6)  5) Pt will increase functional  strength by 15# in order to Assist in opening containers for med management or home management tasks by discharge -Not met, progressing     Plan     Continue with OT plan of care   Updates/Grading for next session: progress as able    SALLIE Streeter

## 2023-11-02 ENCOUNTER — CLINICAL SUPPORT (OUTPATIENT)
Dept: REHABILITATION | Facility: HOSPITAL | Age: 63
End: 2023-11-02
Payer: COMMERCIAL

## 2023-11-02 DIAGNOSIS — Z74.09 STIFFNESS DUE TO IMMOBILITY: ICD-10-CM

## 2023-11-02 DIAGNOSIS — M79.622 PAIN OF LEFT UPPER ARM: Primary | ICD-10-CM

## 2023-11-02 DIAGNOSIS — R53.1 WEAKNESS: ICD-10-CM

## 2023-11-02 DIAGNOSIS — M25.60 STIFFNESS DUE TO IMMOBILITY: ICD-10-CM

## 2023-11-02 PROCEDURE — 97112 NEUROMUSCULAR REEDUCATION: CPT | Mod: PN

## 2023-11-02 PROCEDURE — 97110 THERAPEUTIC EXERCISES: CPT | Mod: PN

## 2023-11-02 PROCEDURE — 97140 MANUAL THERAPY 1/> REGIONS: CPT | Mod: PN

## 2023-11-07 ENCOUNTER — PATIENT MESSAGE (OUTPATIENT)
Dept: ORTHOPEDICS | Facility: CLINIC | Age: 63
End: 2023-11-07
Payer: COMMERCIAL

## 2023-11-08 NOTE — PROGRESS NOTES
"[facility]   Occupational Therapy Treatment Note     Date: 11/9/2023  Name: Vanessa Olsen  Clinic Number: [id number]    Therapy Diagnosis:   Encounter Diagnoses   Name Primary?    Pain of left upper arm Yes    Weakness     Stiffness due to immobility      Physician: Freddie Quintero MD    Physician Orders: Eval and Treat  Medical Diagnosis: M77.12 (ICD-10-CM) - Lateral epicondylitis, left elbow  Date of Injury: 2 months ago  Evaluation Date: 9/26/2023  Insurance Authorization Period Expiration: 12/31/2023  Plan of Care Certification Period: 12/22/2023  Date of Return to MD: none scheduled  Visit # / Visits authorized: 6(+1)/ 20  FOTO: 2/3     Precautions:  Standard     Time In: 8:25  Time Out: 9:15  Total Appointment Time (timed & untimed codes): 50 minutes  Subjective     Pt reports: "I think I would like to go a few more visits and see if we can get rid of this last bit of pain."   she was compliant with home exercise program given last session.   Response to previous treatment: decreased pain  Functional change:good endurance with exercises, issued updated home exercise program     Pain: 1/10  Location: Left lateral epicondyle    Objective     Objective Measures updated at progress report unless specified.   Sensation Test: intact, however reports some tingling in ulnar nerve ditribution     Edema. Measured in centimeters.    9/26/2023 9/26/2023     Left Right   Elbow 30 32   Wrist Crease 16 16   MCPs 18.5 19      Range of Motion/Strength:  Elbow and Wrist ROM. Measured in degrees.    9/26/2023 9/26/2023  10/19/2023       Left Right  Left     Elbow Extension/Flexion 20/130 0/135 5/135     Supination/Pronation WNL WNL WNL      Wrist Ext/Flex 65/52*pain 75/52 75/60     Wrist RD/UD 10/25 20/34 20/30         Strength (Dynamometer Rung II) and Pinch Strength (Pinch Gauge)  Measured in pounds.    9/26/2023 9/26/2023 10/19/2023     Left Right Left   Elbow Flexed 20 60 40   Elbow " "Extended 10 60 30   [place] Pinch 12 21 16   3pt Pinch 8 20 9   2pt Pinch 10 12 8      Special Tests: left              - Cozen's (lateral epicondylitis) test: +              - Mill's (lateral epicondylitis) test: +              - Extensor Digitorum (lateral epicondylitis) test: +              - Medial epicondylitis test: -     Intake Outcome Measure for FOTO Elbow Survey     Therapist reviewed FOTO scores for Vanessa Olsen on 9/26/2023.   FOTO documents entered into [facility] - see Media section.     Intake Score: 41%  4th visit:39%         Treatment     Vanessa received the treatments listed below:     Supervised modalities after being cleared for contradictions: Hot Pack - x 5 minutes for pain management and tissue extensibility    Manual therapy techniques: Soft tissue Mobilization were applied to the: left elbow, wrist and hand for 10 minutes, including:  -ASTYM    Therapeutic exercises to develop ROM for 25 minutes, including:  -ASTYM stretches 1-3 3/30"  -wrist flexion/extension eccentric strengthening 3# 2/15  -elbow flexion/extension 3# 2/15  -hammer swings 2/15  -radial nerve glides 10x  -corner pectoralis stretch 2/30"  -threading the needle 10x   -smiles and frowns red 2/15    Neuromuscular re-education activities to improve Posture for 10 minutes. The following activities were included:  -Green band external rotation 2/15  -Green band horizontal abduction 2/15    Patient Education and Home Exercises     Education provided:   - on current condition and home exercise program   - Progress towards goals   -issued updated home exercise program with green band    Written Home Exercises Provided: Patient instructed to cont prior HEP.  Exercises were reviewed and Vanessa was able to demonstrate them prior to the end of the session.  Vanessa demonstrated good  understanding of the HEP provided.     See EMR under Patient Instructions for exercises provided prior visit.      Assessment    Pt with good participation " this date. Pain report continues to improve and is low. Signs and symptoms of fibrotic tissue noted during ASTYM however responded well to treatment. Issued updated home exercise program with focus on posture and radial nerve glides. Pt able to complete all with good technique and without complaints. Pt motivated.    Vanessa is progressing well towards her goals and there are no updates to goals at this time. Pt prognosis is Good.     Pt will continue to benefit from skilled outpatient occupational therapy to address the deficits listed in the problem list on initial evaluation provide pt/family education and to maximize pt's level of independence in the home and community environment.     Anticipated barriers to occupational therapy: none    Pt's spiritual, cultural and educational needs considered and pt agreeable to plan of care and goals.    Goals:  Short term Goals:  1) Initiate home exercise program -met, ongoing  2) Pt will increase AROM of Left wrist by 10 degrees in order to assist with work tasks by 4 weeks. -met  3) Pt will reduce edema by .5 cm in affected Left elbow by 4 weeks. -met  4) Pt will reduce pain to less than 4/10 by 4 weeks. -met  5) Pt will increase functional  strength by 5# in order to A in opening containers for med management or home management tasks by 4 weeks. -met  6) Patient will be able to achieve less than or equal to 30% on the FOTO, demonstrating overall improved functional ability with upper extremity. (Self-care category) -Not met, progressing     Long Term Goals:  Goals to be met by discharge:  1) Independent with home exercise program -Not met, progressing  2) Pt will increase Left upper extremity range of motion to within functional limits for improved performance with with home management or work related tasks by d/c. -Not met, progressing  3) Pt will decrease edema to trace or none to increase functional ROM by d/c. -Not met, progressing  4) Pt will decrease pain to  trace or none while completing light home management tasks or work related tasks by d/c. -Not met, progressing  5) Patient will be able to achieve less than or equal to 15% on the FOTO, demonstrating overall improved functional ability with upper extremity.  (Self-care category) -Not met, progressing  6) 5) Pt will increase functional  strength by 15# in order to Assist in opening containers for med management or home management tasks by discharge -Not met, progressing     Plan     Continue with OT plan of care   Updates/Grading for next session: progress as able, continue a few more visits with focus on posture, eccentric strengthening and nerve glides    SALLIE Streeter

## 2023-11-09 ENCOUNTER — PATIENT MESSAGE (OUTPATIENT)
Dept: REHABILITATION | Facility: HOSPITAL | Age: 63
End: 2023-11-09

## 2023-11-09 ENCOUNTER — CLINICAL SUPPORT (OUTPATIENT)
Dept: REHABILITATION | Facility: HOSPITAL | Age: 63
End: 2023-11-09
Payer: COMMERCIAL

## 2023-11-09 DIAGNOSIS — M79.622 PAIN OF LEFT UPPER ARM: Primary | ICD-10-CM

## 2023-11-09 DIAGNOSIS — M25.60 STIFFNESS DUE TO IMMOBILITY: ICD-10-CM

## 2023-11-09 DIAGNOSIS — Z74.09 STIFFNESS DUE TO IMMOBILITY: ICD-10-CM

## 2023-11-09 DIAGNOSIS — R53.1 WEAKNESS: ICD-10-CM

## 2023-11-09 PROCEDURE — 97140 MANUAL THERAPY 1/> REGIONS: CPT | Mod: PN

## 2023-11-09 PROCEDURE — 97110 THERAPEUTIC EXERCISES: CPT | Mod: PN

## 2023-11-09 PROCEDURE — 97112 NEUROMUSCULAR REEDUCATION: CPT | Mod: PN

## 2023-11-13 ENCOUNTER — OFFICE VISIT (OUTPATIENT)
Dept: OBSTETRICS AND GYNECOLOGY | Facility: CLINIC | Age: 63
End: 2023-11-13
Attending: OBSTETRICS & GYNECOLOGY
Payer: COMMERCIAL

## 2023-11-13 VITALS — DIASTOLIC BLOOD PRESSURE: 84 MMHG | SYSTOLIC BLOOD PRESSURE: 151 MMHG | BODY MASS INDEX: 36.49 KG/M2 | HEIGHT: 68 IN

## 2023-11-13 DIAGNOSIS — Z01.419 ENCOUNTER FOR GYNECOLOGICAL EXAMINATION WITHOUT ABNORMAL FINDING: Primary | ICD-10-CM

## 2023-11-13 DIAGNOSIS — Z79.890 HORMONE REPLACEMENT THERAPY (HRT): ICD-10-CM

## 2023-11-13 PROCEDURE — 3008F BODY MASS INDEX DOCD: CPT | Mod: CPTII,S$GLB,, | Performed by: OBSTETRICS & GYNECOLOGY

## 2023-11-13 PROCEDURE — 99396 PREV VISIT EST AGE 40-64: CPT | Mod: S$GLB,,, | Performed by: OBSTETRICS & GYNECOLOGY

## 2023-11-13 PROCEDURE — 3008F PR BODY MASS INDEX (BMI) DOCUMENTED: ICD-10-PCS | Mod: CPTII,S$GLB,, | Performed by: OBSTETRICS & GYNECOLOGY

## 2023-11-13 PROCEDURE — 3077F SYST BP >= 140 MM HG: CPT | Mod: CPTII,S$GLB,, | Performed by: OBSTETRICS & GYNECOLOGY

## 2023-11-13 PROCEDURE — 3079F PR MOST RECENT DIASTOLIC BLOOD PRESSURE 80-89 MM HG: ICD-10-PCS | Mod: CPTII,S$GLB,, | Performed by: OBSTETRICS & GYNECOLOGY

## 2023-11-13 PROCEDURE — 1159F MED LIST DOCD IN RCRD: CPT | Mod: CPTII,S$GLB,, | Performed by: OBSTETRICS & GYNECOLOGY

## 2023-11-13 PROCEDURE — 99396 PR PREVENTIVE VISIT,EST,40-64: ICD-10-PCS | Mod: S$GLB,,, | Performed by: OBSTETRICS & GYNECOLOGY

## 2023-11-13 PROCEDURE — 99999 PR PBB SHADOW E&M-EST. PATIENT-LVL III: ICD-10-PCS | Mod: PBBFAC,,, | Performed by: OBSTETRICS & GYNECOLOGY

## 2023-11-13 PROCEDURE — 3079F DIAST BP 80-89 MM HG: CPT | Mod: CPTII,S$GLB,, | Performed by: OBSTETRICS & GYNECOLOGY

## 2023-11-13 PROCEDURE — 3077F PR MOST RECENT SYSTOLIC BLOOD PRESSURE >= 140 MM HG: ICD-10-PCS | Mod: CPTII,S$GLB,, | Performed by: OBSTETRICS & GYNECOLOGY

## 2023-11-13 PROCEDURE — 1159F PR MEDICATION LIST DOCUMENTED IN MEDICAL RECORD: ICD-10-PCS | Mod: CPTII,S$GLB,, | Performed by: OBSTETRICS & GYNECOLOGY

## 2023-11-13 PROCEDURE — 99999 PR PBB SHADOW E&M-EST. PATIENT-LVL III: CPT | Mod: PBBFAC,,, | Performed by: OBSTETRICS & GYNECOLOGY

## 2023-11-13 RX ORDER — ESTRADIOL 1 MG/1
1 TABLET ORAL DAILY
Qty: 30 TABLET | Refills: 11 | Status: SHIPPED | OUTPATIENT
Start: 2023-11-13 | End: 2024-11-12

## 2023-11-13 NOTE — PROGRESS NOTES
SUBJECTIVE:   63 y.o. female   for annual routine checkup. Patient's last menstrual period was 2013..  She reports that she weaned off of Prometrium and had no side effects. She wuld like to discuss decreasing Estradiol level. .        Past Medical History:   Diagnosis Date    Allergy     Breast disorder     Left Breast Bx - Benign     Cataract     Fracture, ribs     H/O cold sores     History of robot-assisted laparoscopic hysterectomy 2021    Hormone replacement therapy (HRT)     Menopause     PONV (postoperative nausea and vomiting)     Tennis elbow     PT     Past Surgical History:   Procedure Laterality Date    BREAST BIOPSY Left     benign  (11:00  o'clock ?? )     CHOLECYSTECTOMY      COLONOSCOPY N/A 3/5/2018    Procedure: COLONOSCOPY;  Surgeon: Francisco Du MD;  Location: [facility] (4TH FLR);  Service: Endoscopy;  Laterality: N/A;  3 year f/u - history of colon polyps - ER    COLONOSCOPY N/A 3/27/2023    Procedure: COLONOSCOPY;  Surgeon: Francisco Du MD;  Location: [facility] ([facility] FLR);  Service: Endoscopy;  Laterality: N/A;  Pt request Suprep , instr via portal - PC  pre-call bm .    CYSTOSCOPY N/A 2021    Procedure: CYSTOSCOPY;  Surgeon: Isaura Cassidy MD;  Location: [facility];  Service: OB/GYN;  Laterality: N/A;    HYSTERECTOMY  2021    HYSTEROSCOPY N/A 2021    Procedure: HYSTEROSCOPY;  Surgeon: Isaura Cassidy MD;  Location: [facility];  Service: OB/GYN;  Laterality: N/A;    ROBOT-ASSISTED LAPAROSCOPIC ABDOMINAL HYSTERECTOMY USING DA VANESSA XI Bilateral 2021    Procedure: XI ROBOTIC HYSTERECTOMY;  Surgeon: Isaura Cassidy MD;  Location: [facility];  Service: OB/GYN;  Laterality: Bilateral;  BSO    ROBOT-ASSISTED LAPAROSCOPIC SALPINGO-OOPHORECTOMY Bilateral 2021    Procedure: ROBOTIC SALPINGO-OOPHORECTOMY;  Surgeon: Isaura Cassidy MD;  Location: [facility] OR;  Service: OB/GYN;  Laterality: Bilateral;    WISDOM TOOTH EXTRACTION        Social History     Socioeconomic History    Marital status: Single   Tobacco Use    Smoking status: Never    Smokeless tobacco: Never   Substance and Sexual Activity    Alcohol use: Yes     Comment: socially    Drug use: No    Sexual activity: Not Currently     Partners: Male     Birth control/protection: Post-menopausal, Surgical     Comment: Single:         2013 //  DVH/BSO 2021     Social Determinants of Health     Financial Resource Strain: Low Risk  (1/26/2023)    Overall Financial Resource Strain (CARDIA)     Difficulty of Paying Living Expenses: Not hard at all   Food Insecurity: No Food Insecurity (1/26/2023)    Hunger Vital Sign     Worried About Running Out of Food in the Last Year: Never true     Ran Out of Food in the Last Year: Never true   Transportation Needs: No Transportation Needs (1/26/2023)    PRAPARE - Transportation     Lack of Transportation (Medical): No     Lack of Transportation (Non-Medical): No   Physical Activity: Insufficiently Active (1/26/2023)    Exercise Vital Sign     Days of Exercise per Week: 1 day     Minutes of Exercise per Session: 30 min   Stress: No Stress Concern Present (1/26/2023)    [place] [employer] - Occupational Stress Questionnaire     Feeling of Stress : Only a little   Social Connections: Unknown (1/26/2023)    Social Connection and Isolation Panel [NHANES]     Frequency of Communication with Friends and Family: Three times a week     Frequency of Social Gatherings with Friends and Family: Twice a week     Active Member of Clubs or Organizations: No     Attends Club or Organization Meetings: Never     Marital Status: Never    Housing Stability: Low Risk  (1/26/2023)    Housing Stability Vital Sign     Unable to Pay for Housing in the Last Year: No     Number of Places Lived in the Last Year: 1     Unstable Housing in the Last Year: No     Family History   Problem Relation Age of Onset    Hypertension Father     Colon polyps  Father 82        large poly needing surgery    Pulmonary embolism Father     Macular degeneration Mother     Cataracts Mother     Hypertension Mother     Heart disease Mother         at fib and pacemaker    Glaucoma Brother     Hypertension Brother     Colon polyps Brother 58        several advanced colon adenomas    Lung cancer Brother     Breast cancer Paternal Aunt     Prostate cancer Paternal Uncle     Breast cancer Paternal Cousin     Amblyopia Neg Hx     Blindness Neg Hx     Diabetes Neg Hx     Retinal detachment Neg Hx     Strabismus Neg Hx     Stroke Neg Hx     Thyroid disease Neg Hx     Celiac disease Neg Hx     Cirrhosis Neg Hx     Colon cancer Neg Hx     Esophageal cancer Neg Hx     Inflammatory bowel disease Neg Hx     Liver disease Neg Hx     Rectal cancer Neg Hx     Stomach cancer Neg Hx     Liver cancer Neg Hx     Ulcerative colitis Neg Hx     Ovarian cancer Neg Hx     Cancer Neg Hx     Uterine cancer Neg Hx     Cervical cancer Neg Hx      OB History    Para Term  AB Living   0 0 0 0 0 0   SAB IAB Ectopic Multiple Live Births   0 0 0 0 0   Obstetric Comments   Menarche ~ 10           Current Outpatient Medications   Medication Sig Dispense Refill    cetirizine (ZYRTEC) 5 MG tablet Take 5 mg by mouth once daily.      cycloSPORINE (RESTASIS) 0.05 % ophthalmic emulsion Place 1 drop into both eyes 2 (two) times daily. 60 each 11    ergocalciferol, vitamin D2, (VITAMIN D ORAL) Take by mouth.      estradioL (ESTRACE) 2 MG tablet Take 1 tablet (2 mg total) by mouth once daily. 90 tablet 3    prasterone, dhea, (INTRAROSA) 6.5 mg Inst Place 6.5 mg vaginally every evening. 28 each 11    COVID [id number],,andu,,PF, (SPIKEVAX [id number],12Y UP,,PF,) 50 mcg/0.5 mL injection Inject 0.5 mLs into the muscle. 0.5 mL 0    estradioL (ESTRACE) 1 MG tablet Take 1 tablet (1 mg total) by mouth once daily. 30 tablet 11    progesterone (PROMETRIUM) 200 MG capsule Take 1 capsule by mouth 30-60 minutes before  bed every night (Patient not taking: Reported on 11/13/2023) 90 capsule 0     No current facility-administered medications for this visit.     Allergies: Patient has no known allergies.     The 10-year ASCVD risk score (Alondra MCKEON, et al., 2019) is: 6%    Values used to calculate the score:      Age: 63 years      Sex: Female      Is Non- : No      Diabetic: No      Tobacco smoker: No      Systolic Blood Pressure: 151 mmHg      Is BP treated: No      HDL Cholesterol: 57 mg/dL      Total Cholesterol: 196 mg/dL      ROS:  Constitutional: no weight loss, weight gain, fever, fatigue  Eyes:  No vision changes, glasses/contacts  ENT/Mouth: No ulcers, sinus problems, ears ringing, headache  Cardiovascular: No inability to lie flat, chest pain, exercise intolerance, swelling, heart palpitations  Respiratory: No wheezing, coughing blood, shortness of breath, or cough  Gastrointestinal: No diarrhea, bloody stool, nausea/vomiting, constipation, gas, hemorrhoids  Genitourinary: No blood in urine, painful urination, urgency of urination, frequency of urination, incomplete emptying, incontinence, abnormal bleeding, painful periods, heavy periods, vaginal discharge, vaginal odor, painful intercourse, sexual problems, bleeding after intercourse.  Musculoskeletal: No muscle weakness  Skin/Breast: No painful breasts, nipple discharge, masses, rash, ulcers  Neurological: No passing out, seizures, numbness, headache  Endocrine: No diabetes, hypothyroid, hyperthyroid, hot flashes, hair loss, abnormal hair growth, acne  Psychiatric: No depression, crying  Hematologic: No bruises, bleeding, swollen lymph nodes, anemia.      Physical Exam:   Constitutional: She is oriented to person, place, and time. She appears well-developed and well-nourished.      Neck: No tracheal deviation present. No thyromegaly present.    Cardiovascular:       Exam reveals no edema.        Pulmonary/Chest: Effort normal. She exhibits no  mass, no tenderness, no deformity and no retraction. Right breast exhibits no inverted nipple, no mass, no nipple discharge, no skin change, no tenderness, presence, no bleeding and no swelling. Left breast exhibits no inverted nipple, no mass, no nipple discharge, no skin change, no tenderness, presence, no bleeding and no swelling. Breasts are symmetrical.        Abdominal: Soft. She exhibits no distension and no mass. There is no abdominal tenderness. There is no rebound and no guarding. No hernia. Hernia confirmed negative in the left inguinal area.     Genitourinary: Rectum:      No external hemorrhoid.   There is no rash, tenderness or lesion on the right labia. There is no rash, tenderness or lesion on the left labia. No no adexnal prolapse. Right adnexum displays no mass, no tenderness and no fullness. Left adnexum displays no mass, no tenderness and no fullness. Vaginal cuff normal.  No  no vaginal discharge, tenderness, bleeding, rectocele, cystocele or unspecified prolapse of vaginal walls in the vagina. Cervix is absent.Uterus is absent.           Musculoskeletal: Normal range of motion and moves all extremeties. No edema.       Neurological: She is alert and oriented to person, place, and time.    Skin: No rash noted. No erythema. No pallor.    Psychiatric: She has a normal mood and affect. Her behavior is normal. Judgment and thought content normal.         ASSESSMENT:   well woman  HRT  PLAN:   mammogram  Will try Estradiol 1mg daily (decrease from 2mg)  return annually or prn

## 2023-11-14 NOTE — PROGRESS NOTES
"[facility]   Occupational Therapy Treatment Note     Date: 11/16/2023  Name: Vanessa Olsen  Clinic Number: [id number]    Therapy Diagnosis:   Encounter Diagnoses   Name Primary?    Pain of left upper arm Yes    Weakness     Stiffness due to immobility      Physician: Freddie Quintero MD    Physician Orders: Eval and Treat  Medical Diagnosis: M77.12 (ICD-10-CM) - Lateral epicondylitis, left elbow  Date of Injury: 2 months ago  Evaluation Date: 9/26/2023  Insurance Authorization Period Expiration: 12/31/2023  Plan of Care Certification Period: 12/22/2023  Date of Return to MD: none scheduled  Visit # / Visits authorized: 7(+1)/ 20  FOTO: 2/3     Precautions:  Standard     Time In: 8:30  Time Out: 9:15  Total Appointment Time (timed & untimed codes): 45 minutes  Subjective     Pt reports: "My arm is feeling good today."   she was compliant with home exercise program given last session.   Response to previous treatment: low pain  Functional change:good endurance with exercises, improved  and pinch strength    Pain: 1/10  Location: Left lateral epicondyle    Objective     Objective Measures updated at progress report unless specified.   Sensation Test: intact, however reports some tingling in ulnar nerve ditribution     Edema. Measured in centimeters.    9/26/2023 9/26/2023     Left Right   Elbow 30 32   Wrist Crease 16 16   MCPs 18.5 19      Range of Motion/Strength:  Elbow and Wrist ROM. Measured in degrees.    9/26/2023 9/26/2023  10/19/2023       Left Right  Left     Elbow Extension/Flexion 20/130 0/135 5/135     Supination/Pronation WNL WNL WNL      Wrist Ext/Flex 65/52*pain 75/52 75/60     Wrist RD/UD 10/25 20/34 20/30         Strength (Dynamometer Rung II) and Pinch Strength (Pinch Gauge)  Measured in pounds.    9/26/2023 9/26/2023 10/19/2023 11/16/2023     Left Right Left Left   Elbow Flexed 20 60 40 40   Elbow Extended 10 60 30 40   Key Pinch 12 21 16 20   3pt Pinch 8 " "20 9 12   2pt Pinch 10 12 8 11      Special Tests: left              - Cozen's (lateral epicondylitis) test: +              - Mill's (lateral epicondylitis) test: +              - Extensor Digitorum (lateral epicondylitis) test: +              - Medial epicondylitis test: -     Intake Outcome Measure for FOTO Elbow Survey     Therapist reviewed FOTO scores for Vanessa Olsen on 9/26/2023.   FOTO documents entered into [facility] - see Media section.     Intake Score: 41%  4th visit:39%         Treatment     Vanessa received the treatments listed below:     Supervised modalities after being cleared for contradictions: Hot Pack - x 5 minutes for pain management and tissue extensibility    Manual therapy techniques: Soft tissue Mobilization were applied to the: left elbow, wrist and hand for 10 minutes, including:  -ASTYM    Therapeutic exercises to develop ROM for 20 minutes, including:  -ASTYM stretches 1-3 3/30"  -wrist flexion/extension eccentric strengthening 3# 2/15  -elbow flexion/extension 3# 2/15  -hammer swings 2/15  -radial nerve glides 10x  -corner pectoralis stretch 2/30"  -threading the needle 10x   -smiles and frowns green 2/15    Neuromuscular re-education activities to improve Posture for 10 minutes. The following activities were included:  -Green band external rotation 2/15  -Green band horizontal abduction 2/15    Patient Education and Home Exercises     Education provided:   - on current condition and home exercise program   - Progress towards goals   -issued updated home exercise program with green band    Written Home Exercises Provided: Patient instructed to cont prior HEP.  Exercises were reviewed and Vanessa was able to demonstrate them prior to the end of the session.  Vanessa demonstrated good  understanding of the HEP provided.     See EMR under Patient Instructions for exercises provided prior visit.      Assessment    Pt with good participation this date. Pain report continues to improve " and is low. Signs and symptoms of fibrotic tissue noted during ASTYM however responded well to treatment. Patient with an improved  and pinch strength. Good endurance with exercises, completed all with good technique and without complaints. Pt motivated.    aVnessa is progressing well towards her goals and there are no updates to goals at this time. Pt prognosis is Good.     Pt will continue to benefit from skilled outpatient occupational therapy to address the deficits listed in the problem list on initial evaluation provide pt/family education and to maximize pt's level of independence in the home and community environment.     Anticipated barriers to occupational therapy: none    Pt's spiritual, cultural and educational needs considered and pt agreeable to plan of care and goals.    Goals:  Short term Goals:  1) Initiate home exercise program -met, ongoing  2) Pt will increase AROM of Left wrist by 10 degrees in order to assist with work tasks by 4 weeks. -met  3) Pt will reduce edema by .5 cm in affected Left elbow by 4 weeks. -met  4) Pt will reduce pain to less than 4/10 by 4 weeks. -met  5) Pt will increase functional  strength by 5# in order to A in opening containers for med management or home management tasks by 4 weeks. -met  6) Patient will be able to achieve less than or equal to 30% on the FOTO, demonstrating overall improved functional ability with upper extremity. (Self-care category) -Not met, progressing     Long Term Goals:  Goals to be met by discharge:  1) Independent with home exercise program -Not met, progressing  2) Pt will increase Left upper extremity range of motion to within functional limits for improved performance with with home management or work related tasks by d/c. -Not met, progressing  3) Pt will decrease edema to trace or none to increase functional ROM by d/c. -Not met, progressing  4) Pt will decrease pain to trace or none while completing light home management tasks  or work related tasks by d/c. -Not met, progressing  5) Patient will be able to achieve less than or equal to 15% on the FOTO, demonstrating overall improved functional ability with upper extremity.  (Self-care category) -Not met, progressing  6) 5) Pt will increase functional  strength by 15# in order to Assist in opening containers for med management or home management tasks by discharge -Not met, progressing     Plan     Continue with OT plan of care   Updates/Grading for next session: progress as able, continue a few more visits with focus on posture, eccentric strengthening and nerve glides    SALLIE Streeter

## 2023-11-16 ENCOUNTER — CLINICAL SUPPORT (OUTPATIENT)
Dept: REHABILITATION | Facility: HOSPITAL | Age: 63
End: 2023-11-16
Payer: COMMERCIAL

## 2023-11-16 DIAGNOSIS — M25.60 STIFFNESS DUE TO IMMOBILITY: ICD-10-CM

## 2023-11-16 DIAGNOSIS — R53.1 WEAKNESS: ICD-10-CM

## 2023-11-16 DIAGNOSIS — Z74.09 STIFFNESS DUE TO IMMOBILITY: ICD-10-CM

## 2023-11-16 DIAGNOSIS — M79.622 PAIN OF LEFT UPPER ARM: Primary | ICD-10-CM

## 2023-11-16 PROCEDURE — 97112 NEUROMUSCULAR REEDUCATION: CPT | Mod: PN

## 2023-11-16 PROCEDURE — 97110 THERAPEUTIC EXERCISES: CPT | Mod: PN

## 2023-11-16 PROCEDURE — 97140 MANUAL THERAPY 1/> REGIONS: CPT | Mod: PN

## 2023-11-27 NOTE — PROGRESS NOTES
"[facility]   Occupational Therapy Treatment Note     Date: 11/28/2023  Name: Vanessa Olsen  Clinic Number: [id number]    Therapy Diagnosis:   Encounter Diagnoses   Name Primary?    Pain of left upper arm Yes    Weakness     Stiffness due to immobility      Physician: Freddie Quintero MD    Physician Orders: Eval and Treat  Medical Diagnosis: M77.12 (ICD-10-CM) - Lateral epicondylitis, left elbow  Date of Injury: 2 months ago  Evaluation Date: 9/26/2023  Insurance Authorization Period Expiration: 12/31/2023  Plan of Care Certification Period: 12/22/2023  Date of Return to MD: none scheduled  Visit # / Visits authorized: 8(+1)/ 20  FOTO: 2/3     Precautions:  Standard     Time In: 8:30  Time Out: 9:15  Total Appointment Time (timed & untimed codes): 45 minutes  Subjective     Pt reports: "My pain is really low and still at just that one spot."   she was compliant with home exercise program given last session.   Response to previous treatment: low pain  Functional change: tolerated progression of treatment well    Pain: 1/10  Location: Left lateral epicondyle    Objective     Objective Measures updated at progress report unless specified.   Sensation Test: intact, however reports some tingling in ulnar nerve ditribution     Edema. Measured in centimeters.    9/26/2023 9/26/2023     Left Right   Elbow 30 32   Wrist Crease 16 16   MCPs 18.5 19      Range of Motion/Strength:  Elbow and Wrist ROM. Measured in degrees.    9/26/2023 9/26/2023  10/19/2023       Left Right  Left     Elbow Extension/Flexion 20/130 0/135 5/135     Supination/Pronation WNL WNL WNL      Wrist Ext/Flex 65/52*pain 75/52 75/60     Wrist RD/UD 10/25 20/34 20/30         Strength (Dynamometer Rung II) and Pinch Strength (Pinch Gauge)  Measured in pounds.    9/26/2023 9/26/2023 10/19/2023 11/16/2023     Left Right Left Left   Elbow Flexed 20 60 40 40   Elbow Extended 10 60 30 40   Key Pinch 12 21 16 20   3pt Pinch " "8 20 9 12   2pt Pinch 10 12 8 11      Special Tests: left              - Cozen's (lateral epicondylitis) test: +              - Mill's (lateral epicondylitis) test: +              - Extensor Digitorum (lateral epicondylitis) test: +              - Medial epicondylitis test: -     Intake Outcome Measure for FOTO Elbow Survey     Therapist reviewed FOTO scores for Vanessa Olsen on 9/26/2023.   FOTO documents entered into [facility] - see Media section.     Intake Score: 41%  4th visit:39%         Treatment     Vanessa received the treatments listed below:     Manual therapy techniques: Soft tissue Mobilization were applied to the: left elbow, wrist and hand for 10 minutes, including:  -ASTYM    Therapeutic exercises to develop ROM for 25 minutes, including:  -Scifit UBE forward/reverse Level 1 3 minutes  -ASTYM stretches 1-3 3/30"  -wrist flexion/extension eccentric strengthening 3# 2/15  -elbow flexion/extension 3# 2/15  -hammer swings 2/15  -radial nerve glides 10x  -corner pectoralis stretch 2/30"  -threading the needle 10x   -smiles and frowns green 2/15    Neuromuscular re-education activities to improve Posture for 10 minutes. The following activities were included:  -Green band external rotation 2/15  -Green band horizontal abduction 2/15    Patient Education and Home Exercises     Education provided:   - on current condition and home exercise program   - Progress towards goals   -issued updated home exercise program with green band    Written Home Exercises Provided: Patient instructed to cont prior HEP.  Exercises were reviewed and Vanessa was able to demonstrate them prior to the end of the session.  Vanessa demonstrated good  understanding of the HEP provided.     See EMR under Patient Instructions for exercises provided prior visit.      Assessment   Pt with good participation this date. Pain report continues to be low. Signs and symptoms of fibrotic tissue noted during ASTYM however responded well to " treatment. Good endurance with exercises, completed all with good technique and without complaints. Pt motivated.    Vanessa is progressing well towards her goals and there are no updates to goals at this time. Pt prognosis is Good.     Pt will continue to benefit from skilled outpatient occupational therapy to address the deficits listed in the problem list on initial evaluation provide pt/family education and to maximize pt's level of independence in the home and community environment.     Anticipated barriers to occupational therapy: none    Pt's spiritual, cultural and educational needs considered and pt agreeable to plan of care and goals.    Goals:  Short term Goals:  1) Initiate home exercise program -met, ongoing  2) Pt will increase AROM of Left wrist by 10 degrees in order to assist with work tasks by 4 weeks. -met  3) Pt will reduce edema by .5 cm in affected Left elbow by 4 weeks. -met  4) Pt will reduce pain to less than 4/10 by 4 weeks. -met  5) Pt will increase functional  strength by 5# in order to A in opening containers for med management or home management tasks by 4 weeks. -met  6) Patient will be able to achieve less than or equal to 30% on the FOTO, demonstrating overall improved functional ability with upper extremity. (Self-care category) -Not met, progressing     Long Term Goals:  Goals to be met by discharge:  1) Independent with home exercise program -Not met, progressing  2) Pt will increase Left upper extremity range of motion to within functional limits for improved performance with with home management or work related tasks by d/c. -Not met, progressing  3) Pt will decrease edema to trace or none to increase functional ROM by d/c. -Not met, progressing  4) Pt will decrease pain to trace or none while completing light home management tasks or work related tasks by d/c. -Not met, progressing  5) Patient will be able to achieve less than or equal to 15% on the FOTO, demonstrating  overall improved functional ability with upper extremity.  (Self-care category) -Not met, progressing  6) 5) Pt will increase functional  strength by 15# in order to Assist in opening containers for med management or home management tasks by discharge -Not met, progressing     Plan     Continue with OT plan of care   Updates/Grading for next session: progress as able, continue a few more visits with focus on posture, eccentric strengthening and nerve glides    SALLIE Streeter

## 2023-11-28 ENCOUNTER — CLINICAL SUPPORT (OUTPATIENT)
Dept: REHABILITATION | Facility: HOSPITAL | Age: 63
End: 2023-11-28
Payer: COMMERCIAL

## 2023-11-28 DIAGNOSIS — R53.1 WEAKNESS: ICD-10-CM

## 2023-11-28 DIAGNOSIS — Z74.09 STIFFNESS DUE TO IMMOBILITY: ICD-10-CM

## 2023-11-28 DIAGNOSIS — M79.622 PAIN OF LEFT UPPER ARM: Primary | ICD-10-CM

## 2023-11-28 DIAGNOSIS — M25.60 STIFFNESS DUE TO IMMOBILITY: ICD-10-CM

## 2023-11-28 PROCEDURE — 97110 THERAPEUTIC EXERCISES: CPT | Mod: PN

## 2023-11-28 PROCEDURE — 97140 MANUAL THERAPY 1/> REGIONS: CPT | Mod: PN

## 2023-11-28 PROCEDURE — 97112 NEUROMUSCULAR REEDUCATION: CPT | Mod: PN

## 2023-12-06 NOTE — PROGRESS NOTES
"[facility]   Occupational Therapy Treatment Note     Date: 12/7/2023  Name: Vanessa Olsen  Clinic Number: [id number]    Therapy Diagnosis:   Encounter Diagnoses   Name Primary?    Pain of left upper arm Yes    Weakness     Stiffness due to immobility      Physician: Freddie Quintero MD    Physician Orders: Eval and Treat  Medical Diagnosis: M77.12 (ICD-10-CM) - Lateral epicondylitis, left elbow  Date of Injury: 2 months ago  Evaluation Date: 9/26/2023  Insurance Authorization Period Expiration: 12/31/2023  Plan of Care Certification Period: 12/22/2023  Date of Return to MD: none scheduled  Visit # / Visits authorized: 8(+1)/ 20  FOTO: 2/3     Precautions:  Standard     Time In: 8:25  Time Out: 9:15  Total Appointment Time (timed & untimed codes): 50 minutes  Subjective     Pt reports: "It's still just that one spot."   she was compliant with home exercise program given last session.   Response to previous treatment: low pain  Functional change: tolerated progression of treatment well    Pain: 1/10  Location: Left lateral epicondyle    Objective     Objective Measures updated at progress report unless specified.   Sensation Test: intact, however reports some tingling in ulnar nerve ditribution     Edema. Measured in centimeters.    9/26/2023 9/26/2023     Left Right   Elbow 30 32   Wrist Crease 16 16   MCPs 18.5 19      Range of Motion/Strength:  Elbow and Wrist ROM. Measured in degrees.    9/26/2023 9/26/2023  10/19/2023       Left Right  Left     Elbow Extension/Flexion 20/130 0/135 5/135     Supination/Pronation WNL WNL WNL      Wrist Ext/Flex 65/52*pain 75/52 75/60     Wrist RD/UD 10/25 20/34 20/30         Strength (Dynamometer Rung II) and Pinch Strength (Pinch Gauge)  Measured in pounds.    9/26/2023 9/26/2023 10/19/2023 11/16/2023     Left Right Left Left   Elbow Flexed 20 60 40 40   Elbow Extended 10 60 30 40   Key Pinch 12 21 16 20   3pt Pinch 8 20 9 12   2pt Pinch 10 " "12 8 11      Special Tests: left              - Cozen's (lateral epicondylitis) test: +              - Mill's (lateral epicondylitis) test: +              - Extensor Digitorum (lateral epicondylitis) test: +              - Medial epicondylitis test: -     Intake Outcome Measure for FOTO Elbow Survey     Therapist reviewed FOTO scores for Vanessa Olsen on 9/26/2023.   FOTO documents entered into [facility] - see Media section.     Intake Score: 41%  4th visit:39%         Treatment     Vanessa received the treatments listed below:     Manual therapy techniques: Soft tissue Mobilization were applied to the: left elbow, wrist and hand for 15 minutes, including:  -ASTYM and cupping   -Kinesiotaping: space correction applied to left lateral epicondyle. Patient instructed on purpose, wear, care, precautions to monitor and removal of KT. Patient verbalized understanding of all instructions provided.     Therapeutic exercises to develop ROM for 25 minutes, including:  -Scifit UBE forward/reverse Level 2 3 minutes  -ASTYM stretches 1-3 3/30"  -wrist flexion/extension eccentric strengthening 3# 2/15  -elbow flexion/extension 3# 2/15  -hammer swings 2/15  -radial nerve glides 10x  -corner pectoralis stretch 2/30"  -threading the needle 10x   -smiles and frowns green 2/15    Neuromuscular re-education activities to improve Posture for 10 minutes. The following activities were included:  -Green band external rotation 2/15  -Green band horizontal abduction 2/15    Patient Education and Home Exercises     Education provided:   - on current condition and home exercise program   - Progress towards goals   -issued updated home exercise program with green band    Written Home Exercises Provided: Patient instructed to cont prior HEP.  Exercises were reviewed and Vanessa was able to demonstrate them prior to the end of the session.  Vanessa demonstrated good  understanding of the HEP provided.     See EMR under Patient Instructions for " exercises provided prior visit.      Assessment   Pt with good participation this date. Pain report continues to be low. Signs and symptoms of fibrotic tissue noted during ASTYM and cupping however continues to respond well to treatment. Good endurance with exercises, completed all with good technique and without complaints. Patient's reported increased comfort after KT application.     Vanessa is progressing well towards her goals and there are no updates to goals at this time. Pt prognosis is Good.     Pt will continue to benefit from skilled outpatient occupational therapy to address the deficits listed in the problem list on initial evaluation provide pt/family education and to maximize pt's level of independence in the home and community environment.     Anticipated barriers to occupational therapy: none    Pt's spiritual, cultural and educational needs considered and pt agreeable to plan of care and goals.    Goals:  Short term Goals:  1) Initiate home exercise program -met, ongoing  2) Pt will increase AROM of Left wrist by 10 degrees in order to assist with work tasks by 4 weeks. -met  3) Pt will reduce edema by .5 cm in affected Left elbow by 4 weeks. -met  4) Pt will reduce pain to less than 4/10 by 4 weeks. -met  5) Pt will increase functional  strength by 5# in order to A in opening containers for med management or home management tasks by 4 weeks. -met  6) Patient will be able to achieve less than or equal to 30% on the FOTO, demonstrating overall improved functional ability with upper extremity. (Self-care category) -Not met, progressing     Long Term Goals:  Goals to be met by discharge:  1) Independent with home exercise program -Not met, progressing  2) Pt will increase Left upper extremity range of motion to within functional limits for improved performance with with home management or work related tasks by d/c. -Not met, progressing  3) Pt will decrease edema to trace or none to increase  functional ROM by d/c. -Not met, progressing  4) Pt will decrease pain to trace or none while completing light home management tasks or work related tasks by d/c. -Not met, progressing  5) Patient will be able to achieve less than or equal to 15% on the FOTO, demonstrating overall improved functional ability with upper extremity.  (Self-care category) -Not met, progressing  6) 5) Pt will increase functional  strength by 15# in order to Assist in opening containers for med management or home management tasks by discharge -Not met, progressing     Plan     Continue with OT plan of care   Updates/Grading for next session: progress as able, continue a few more visits with focus on posture, eccentric strengthening and nerve glides    SALLIE Streeter

## 2023-12-07 ENCOUNTER — CLINICAL SUPPORT (OUTPATIENT)
Dept: REHABILITATION | Facility: HOSPITAL | Age: 63
End: 2023-12-07
Payer: COMMERCIAL

## 2023-12-07 DIAGNOSIS — M25.60 STIFFNESS DUE TO IMMOBILITY: ICD-10-CM

## 2023-12-07 DIAGNOSIS — Z74.09 STIFFNESS DUE TO IMMOBILITY: ICD-10-CM

## 2023-12-07 DIAGNOSIS — M79.622 PAIN OF LEFT UPPER ARM: Primary | ICD-10-CM

## 2023-12-07 DIAGNOSIS — R53.1 WEAKNESS: ICD-10-CM

## 2023-12-07 PROCEDURE — 97110 THERAPEUTIC EXERCISES: CPT | Mod: PN

## 2023-12-07 PROCEDURE — 97112 NEUROMUSCULAR REEDUCATION: CPT | Mod: PN

## 2023-12-07 PROCEDURE — 97140 MANUAL THERAPY 1/> REGIONS: CPT | Mod: PN

## 2023-12-13 NOTE — PROGRESS NOTES
"[facility]   Occupational Therapy Treatment Note     Date: 12/14/2023  Name: Vanessa Olsen  Clinic Number: [id number]    Therapy Diagnosis:   Encounter Diagnoses   Name Primary?    Pain of left upper arm Yes    Weakness     Stiffness due to immobility      Physician: Freddie Quintero MD    Physician Orders: Eval and Treat  Medical Diagnosis: M77.12 (ICD-10-CM) - Lateral epicondylitis, left elbow  Date of Injury: 2 months ago  Evaluation Date: 9/26/2023  Insurance Authorization Period Expiration: 12/31/2023  Plan of Care Certification Period: 12/22/2023  Date of Return to MD: none scheduled  Visit # / Visits authorized: 10(+1)/ 20  FOTO: 3/3     Precautions:  Standard     Time In: 2:30  Time Out: 3:20  Total Appointment Time (timed & untimed codes): 50 minutes  Subjective     Pt reports: "I've been lifting bags all day and it feels good."   she was compliant with home exercise program given last session.   Response to previous treatment: low pain  Functional change: improved range of motion, strength and FOTO score    Pain: 1/10  Location: Left lateral epicondyle    Objective     Objective Measures updated at progress report unless specified.   Sensation Test: intact, however reports some tingling in ulnar nerve ditribution     Edema. Measured in centimeters.    9/26/2023 9/26/2023     Left Right   Elbow 30 32   Wrist Crease 16 16   MCPs 18.5 19      Range of Motion/Strength:  Elbow and Wrist ROM. Measured in degrees.    9/26/2023 9/26/2023  10/19/2023 12/14/2023      Left Right  Left  Left   Elbow Extension/Flexion 20/130 0/135 5/135  WNL   Supination/Pronation WNL WNL WNL   WNL   Wrist Ext/Flex 65/52*pain 75/52 75/60  WNL   Wrist RD/UD 10/25 20/34 20/30        WNL       Strength (Dynamometer Rung II) and Pinch Strength (Pinch Gauge)  Measured in pounds.    9/26/2023 9/26/2023 10/19/2023 11/16/2023 12/14/2023     Left Right Left Left Left   Elbow Flexed 20 60 40 40 45   Elbow " "Extended 10 60 30 40 30   Ojeda Pinch 12 21 16 20 20   3pt Pinch 8 20 9 12 15   2pt Pinch 10 12 8 11 12      Special Tests: left              - Cozen's (lateral epicondylitis) test: +              - Mill's (lateral epicondylitis) test: +              - Extensor Digitorum (lateral epicondylitis) test: +              - Medial epicondylitis test: -     Intake Outcome Measure for FOTO Elbow Survey     Therapist reviewed FOTO scores for Vanessa Olsen on 9/26/2023.   FOTO documents entered into [facility] - see Media section.     Intake Score: 41%  4th visit:39%  10th:31%         Treatment     Vanessa received the treatments listed below:     Manual therapy techniques: Soft tissue Mobilization were applied to the: left elbow, wrist and hand for 15 minutes, including:  -ASTYM and cupping   -Kinesiotaping: space correction applied to left lateral epicondyle. Patient instructed on purpose, wear, care, precautions to monitor and removal of KT. Patient verbalized understanding of all instructions provided.     Therapeutic exercises to develop ROM for 25 minutes, including:  -Scifit UBE forward/reverse Level 3 3 minutes  -ASTYM stretches 1-3 3/30"  -wrist flexion/extension eccentric strengthening 3# 2/15  -elbow flexion/extension 3# 2/15  -hammer swings 2/15  -radial nerve glides 10x  -corner pectoralis stretch 2/30"  -threading the needle 10x   -smiles and frowns green 2/15    Neuromuscular re-education activities to improve Posture for 10 minutes. The following activities were included:  -Green band external rotation 2/15  -Green band horizontal abduction 2/15    Patient Education and Home Exercises     Education provided:   - on current condition and home exercise program   - Progress towards goals   -issued updated home exercise program with green band    Written Home Exercises Provided: Patient instructed to cont prior HEP.  Exercises were reviewed and Vanessa was able to demonstrate them prior to the end of the session.  " Vanessa demonstrated good  understanding of the HEP provided.     See EMR under Patient Instructions for exercises provided prior visit.      Assessment   Pt with good participation this date. Pain report continues to be low. Signs and symptoms of fibrotic tissue noted during ASTYM and cupping however continues to respond well to treatment and reports more relief with the addition of cupping and KT last session. Good endurance with exercises, completed all with good technique and without complaints. Patient with improved range of motion, strength and FOTO score and may be approaching discharge status.     Vanessa is progressing well towards her goals and there are no updates to goals at this time. Pt prognosis is Good.     Pt will continue to benefit from skilled outpatient occupational therapy to address the deficits listed in the problem list on initial evaluation provide pt/family education and to maximize pt's level of independence in the home and community environment.     Anticipated barriers to occupational therapy: none    Pt's spiritual, cultural and educational needs considered and pt agreeable to plan of care and goals.    Goals:  Short term Goals:  1) Initiate home exercise program -met, ongoing  2) Pt will increase AROM of Left wrist by 10 degrees in order to assist with work tasks by 4 weeks. -met  3) Pt will reduce edema by .5 cm in affected Left elbow by 4 weeks. -met  4) Pt will reduce pain to less than 4/10 by 4 weeks. -met  5) Pt will increase functional  strength by 5# in order to A in opening containers for med management or home management tasks by 4 weeks. -met  6) Patient will be able to achieve less than or equal to 30% on the FOTO, demonstrating overall improved functional ability with upper extremity. (Self-care category) -Not met, progressing     Long Term Goals:  Goals to be met by discharge:  1) Independent with home exercise program -Not met, progressing  2) Pt will increase Left  upper extremity range of motion to within functional limits for improved performance with with home management or work related tasks by d/c. -Not met, progressing  3) Pt will decrease edema to trace or none to increase functional ROM by d/c. -Not met, progressing  4) Pt will decrease pain to trace or none while completing light home management tasks or work related tasks by d/c. -Not met, progressing  5) Patient will be able to achieve less than or equal to 15% on the FOTO, demonstrating overall improved functional ability with upper extremity.  (Self-care category) -Not met, progressing  6) 5) Pt will increase functional  strength by 15# in order to Assist in opening containers for med management or home management tasks by discharge -Not met, progressing     Plan     Continue with OT plan of care   Updates/Grading for next session: anticipate discharge to home exercise program nest session.    SALLIE Streeter

## 2023-12-14 ENCOUNTER — CLINICAL SUPPORT (OUTPATIENT)
Dept: REHABILITATION | Facility: HOSPITAL | Age: 63
End: 2023-12-14
Payer: COMMERCIAL

## 2023-12-14 DIAGNOSIS — R53.1 WEAKNESS: ICD-10-CM

## 2023-12-14 DIAGNOSIS — Z74.09 STIFFNESS DUE TO IMMOBILITY: ICD-10-CM

## 2023-12-14 DIAGNOSIS — M79.622 PAIN OF LEFT UPPER ARM: Primary | ICD-10-CM

## 2023-12-14 DIAGNOSIS — M25.60 STIFFNESS DUE TO IMMOBILITY: ICD-10-CM

## 2023-12-14 PROCEDURE — 97140 MANUAL THERAPY 1/> REGIONS: CPT | Mod: PN

## 2023-12-14 PROCEDURE — 97110 THERAPEUTIC EXERCISES: CPT | Mod: PN

## 2023-12-14 PROCEDURE — 97112 NEUROMUSCULAR REEDUCATION: CPT | Mod: PN

## 2023-12-15 ENCOUNTER — OFFICE VISIT (OUTPATIENT)
Dept: OPTOMETRY | Facility: CLINIC | Age: 63
End: 2023-12-15
Payer: COMMERCIAL

## 2023-12-15 DIAGNOSIS — H52.203 HYPEROPIA OF BOTH EYES WITH ASTIGMATISM AND PRESBYOPIA: ICD-10-CM

## 2023-12-15 DIAGNOSIS — H25.13 NUCLEAR SCLEROSIS OF BOTH EYES: ICD-10-CM

## 2023-12-15 DIAGNOSIS — H52.4 HYPEROPIA OF BOTH EYES WITH ASTIGMATISM AND PRESBYOPIA: ICD-10-CM

## 2023-12-15 DIAGNOSIS — H52.03 HYPEROPIA OF BOTH EYES WITH ASTIGMATISM AND PRESBYOPIA: ICD-10-CM

## 2023-12-15 DIAGNOSIS — H04.123 BILATERAL DRY EYES: Primary | ICD-10-CM

## 2023-12-15 PROCEDURE — 1159F PR MEDICATION LIST DOCUMENTED IN MEDICAL RECORD: ICD-10-PCS | Mod: CPTII,S$GLB,, | Performed by: OPTOMETRIST

## 2023-12-15 PROCEDURE — 92014 COMPRE OPH EXAM EST PT 1/>: CPT | Mod: S$GLB,,, | Performed by: OPTOMETRIST

## 2023-12-15 PROCEDURE — 99999 PR PBB SHADOW E&M-EST. PATIENT-LVL III: CPT | Mod: PBBFAC,,, | Performed by: OPTOMETRIST

## 2023-12-15 PROCEDURE — 92014 PR EYE EXAM, EST PATIENT,COMPREHESV: ICD-10-PCS | Mod: S$GLB,,, | Performed by: OPTOMETRIST

## 2023-12-15 PROCEDURE — 1159F MED LIST DOCD IN RCRD: CPT | Mod: CPTII,S$GLB,, | Performed by: OPTOMETRIST

## 2023-12-15 PROCEDURE — 1160F RVW MEDS BY RX/DR IN RCRD: CPT | Mod: CPTII,S$GLB,, | Performed by: OPTOMETRIST

## 2023-12-15 PROCEDURE — 99999 PR PBB SHADOW E&M-EST. PATIENT-LVL III: ICD-10-PCS | Mod: PBBFAC,,, | Performed by: OPTOMETRIST

## 2023-12-15 PROCEDURE — 1160F PR REVIEW ALL MEDS BY PRESCRIBER/CLIN PHARMACIST DOCUMENTED: ICD-10-PCS | Mod: CPTII,S$GLB,, | Performed by: OPTOMETRIST

## 2023-12-15 PROCEDURE — 92015 DETERMINE REFRACTIVE STATE: CPT | Mod: S$GLB,,, | Performed by: OPTOMETRIST

## 2023-12-15 PROCEDURE — 92015 PR REFRACTION: ICD-10-PCS | Mod: S$GLB,,, | Performed by: OPTOMETRIST

## 2023-12-15 NOTE — PROGRESS NOTES
HPI    Pt here for routine exam LISA 12/02/22      Pt complains of slight blurred vision for computer w specs. Pt noticed   slight momentary stabbing pain in OD eyelid that comes and goes, but no   other symptoms. Pt using systane and rostasis PRN. Pt denies floaters and   flashes.   Last edited by Donna Kim on 12/15/2023 10:07 AM.            Assessment /Plan     For exam results, see Encounter Report.    Bilateral dry eyes    Nuclear sclerosis of both eyes    Hyperopia of both eyes with astigmatism and presbyopia      Continue Restasis bid ou, well controlled, RTc or call for steroid drops for flare ups.  2. Educated pt on presence of cataracts and effects on vision. No surgery at this time. Recheck in one year.  3. New Spectacle Rx given, discussed different options for glasses. RTC 1 year routine eye exam.

## 2023-12-18 ENCOUNTER — PATIENT MESSAGE (OUTPATIENT)
Dept: REHABILITATION | Facility: HOSPITAL | Age: 63
End: 2023-12-18
Payer: COMMERCIAL

## 2024-01-26 ENCOUNTER — TELEPHONE (OUTPATIENT)
Dept: FAMILY MEDICINE | Facility: CLINIC | Age: 64
End: 2024-01-26
Payer: COMMERCIAL

## 2024-01-26 ENCOUNTER — LAB VISIT (OUTPATIENT)
Dept: LAB | Facility: HOSPITAL | Age: 64
End: 2024-01-26
Attending: INTERNAL MEDICINE
Payer: COMMERCIAL

## 2024-01-26 ENCOUNTER — PATIENT MESSAGE (OUTPATIENT)
Dept: INTERNAL MEDICINE | Facility: CLINIC | Age: 64
End: 2024-01-26
Payer: COMMERCIAL

## 2024-01-26 DIAGNOSIS — Z00.00 PREVENTATIVE HEALTH CARE: Primary | ICD-10-CM

## 2024-01-26 DIAGNOSIS — Z00.00 PREVENTATIVE HEALTH CARE: ICD-10-CM

## 2024-01-26 LAB
ALBUMIN SERPL BCP-MCNC: 3.8 G/DL (ref 3.5–5.2)
ALP SERPL-CCNC: 70 U/L (ref 55–135)
ALT SERPL W/O P-5'-P-CCNC: 16 U/L (ref 10–44)
ANION GAP SERPL CALC-SCNC: 9 MMOL/L (ref 8–16)
AST SERPL-CCNC: 19 U/L (ref 10–40)
BASOPHILS # BLD AUTO: 0.03 K/UL (ref 0–0.2)
BASOPHILS NFR BLD: 0.4 % (ref 0–1.9)
BILIRUB SERPL-MCNC: 0.6 MG/DL (ref 0.1–1)
BUN SERPL-MCNC: 14 MG/DL (ref 8–23)
CALCIUM SERPL-MCNC: 9.6 MG/DL (ref 8.7–10.5)
CHLORIDE SERPL-SCNC: 107 MMOL/L (ref 95–110)
CHOLEST SERPL-MCNC: 167 MG/DL (ref 120–199)
CHOLEST/HDLC SERPL: 3.5 {RATIO} (ref 2–5)
CO2 SERPL-SCNC: 23 MMOL/L (ref 23–29)
CREAT SERPL-MCNC: 0.9 MG/DL (ref 0.5–1.4)
DIFFERENTIAL METHOD BLD: ABNORMAL
EOSINOPHIL # BLD AUTO: 0.2 K/UL (ref 0–0.5)
EOSINOPHIL NFR BLD: 3.1 % (ref 0–8)
ERYTHROCYTE [DISTWIDTH] IN BLOOD BY AUTOMATED COUNT: 11.7 % (ref 11.5–14.5)
EST. GFR  (NO RACE VARIABLE): >60 ML/MIN/1.73 M^2
GLUCOSE SERPL-MCNC: 99 MG/DL (ref 70–110)
HCT VFR BLD AUTO: 43.9 % (ref 37–48.5)
HDLC SERPL-MCNC: 48 MG/DL (ref 40–75)
HDLC SERPL: 28.7 % (ref 20–50)
HGB BLD-MCNC: 14.8 G/DL (ref 12–16)
IMM GRANULOCYTES # BLD AUTO: 0.03 K/UL (ref 0–0.04)
IMM GRANULOCYTES NFR BLD AUTO: 0.4 % (ref 0–0.5)
LDLC SERPL CALC-MCNC: 93.8 MG/DL (ref 63–159)
LYMPHOCYTES # BLD AUTO: 1.1 K/UL (ref 1–4.8)
LYMPHOCYTES NFR BLD: 15.1 % (ref 18–48)
MCH RBC QN AUTO: 31.1 PG (ref 27–31)
MCHC RBC AUTO-ENTMCNC: 33.7 G/DL (ref 32–36)
MCV RBC AUTO: 92 FL (ref 82–98)
MONOCYTES # BLD AUTO: 0.5 K/UL (ref 0.3–1)
MONOCYTES NFR BLD: 6.5 % (ref 4–15)
NEUTROPHILS # BLD AUTO: 5.3 K/UL (ref 1.8–7.7)
NEUTROPHILS NFR BLD: 74.5 % (ref 38–73)
NONHDLC SERPL-MCNC: 119 MG/DL
NRBC BLD-RTO: 0 /100 WBC
PLATELET # BLD AUTO: 299 K/UL (ref 150–450)
PMV BLD AUTO: 10.3 FL (ref 9.2–12.9)
POTASSIUM SERPL-SCNC: 4.4 MMOL/L (ref 3.5–5.1)
PROT SERPL-MCNC: 7 G/DL (ref 6–8.4)
RBC # BLD AUTO: 4.76 M/UL (ref 4–5.4)
SODIUM SERPL-SCNC: 139 MMOL/L (ref 136–145)
TRIGL SERPL-MCNC: 126 MG/DL (ref 30–150)
TSH SERPL DL<=0.005 MIU/L-ACNC: 0.89 UIU/ML (ref 0.4–4)
WBC # BLD AUTO: 7.13 K/UL (ref 3.9–12.7)

## 2024-01-26 PROCEDURE — 85025 COMPLETE CBC W/AUTO DIFF WBC: CPT | Performed by: INTERNAL MEDICINE

## 2024-01-26 PROCEDURE — 80061 LIPID PANEL: CPT | Performed by: INTERNAL MEDICINE

## 2024-01-26 PROCEDURE — 36415 COLL VENOUS BLD VENIPUNCTURE: CPT | Mod: XB | Performed by: INTERNAL MEDICINE

## 2024-01-26 PROCEDURE — 84443 ASSAY THYROID STIM HORMONE: CPT | Performed by: INTERNAL MEDICINE

## 2024-01-26 PROCEDURE — 80053 COMPREHEN METABOLIC PANEL: CPT | Performed by: INTERNAL MEDICINE

## 2024-01-29 ENCOUNTER — PATIENT MESSAGE (OUTPATIENT)
Dept: OPTOMETRY | Facility: CLINIC | Age: 64
End: 2024-01-29
Payer: COMMERCIAL

## 2024-01-29 DIAGNOSIS — H04.123 BILATERAL DRY EYES: ICD-10-CM

## 2024-01-29 RX ORDER — CYCLOSPORINE 0.5 MG/ML
1 EMULSION OPHTHALMIC 2 TIMES DAILY
Qty: 180 EACH | Refills: 3 | Status: SHIPPED | OUTPATIENT
Start: 2024-01-29 | End: 2025-01-28

## 2024-01-31 ENCOUNTER — OFFICE VISIT (OUTPATIENT)
Dept: INTERNAL MEDICINE | Facility: CLINIC | Age: 64
End: 2024-01-31
Payer: COMMERCIAL

## 2024-01-31 VITALS
DIASTOLIC BLOOD PRESSURE: 78 MMHG | HEART RATE: 83 BPM | SYSTOLIC BLOOD PRESSURE: 128 MMHG | OXYGEN SATURATION: 96 % | BODY MASS INDEX: 35.22 KG/M2 | WEIGHT: 232.38 LBS | HEIGHT: 68 IN

## 2024-01-31 DIAGNOSIS — I87.2 VENOUS INSUFFICIENCY: ICD-10-CM

## 2024-01-31 DIAGNOSIS — Z00.00 PREVENTATIVE HEALTH CARE: Primary | ICD-10-CM

## 2024-01-31 PROBLEM — R53.1 WEAKNESS: Status: RESOLVED | Noted: 2023-09-26 | Resolved: 2024-01-31

## 2024-01-31 PROBLEM — M25.60 STIFFNESS DUE TO IMMOBILITY: Status: RESOLVED | Noted: 2023-09-26 | Resolved: 2024-01-31

## 2024-01-31 PROBLEM — M79.622 PAIN OF LEFT UPPER ARM: Status: RESOLVED | Noted: 2023-09-26 | Resolved: 2024-01-31

## 2024-01-31 PROBLEM — Z74.09 STIFFNESS DUE TO IMMOBILITY: Status: RESOLVED | Noted: 2023-09-26 | Resolved: 2024-01-31

## 2024-01-31 PROCEDURE — 99999 PR PBB SHADOW E&M-EST. PATIENT-LVL IV: CPT | Mod: PBBFAC,,, | Performed by: INTERNAL MEDICINE

## 2024-01-31 PROCEDURE — 1159F MED LIST DOCD IN RCRD: CPT | Mod: CPTII,S$GLB,, | Performed by: INTERNAL MEDICINE

## 2024-01-31 PROCEDURE — 99396 PREV VISIT EST AGE 40-64: CPT | Mod: S$GLB,,, | Performed by: INTERNAL MEDICINE

## 2024-01-31 PROCEDURE — 3074F SYST BP LT 130 MM HG: CPT | Mod: CPTII,S$GLB,, | Performed by: INTERNAL MEDICINE

## 2024-01-31 PROCEDURE — 3008F BODY MASS INDEX DOCD: CPT | Mod: CPTII,S$GLB,, | Performed by: INTERNAL MEDICINE

## 2024-01-31 PROCEDURE — 3078F DIAST BP <80 MM HG: CPT | Mod: CPTII,S$GLB,, | Performed by: INTERNAL MEDICINE

## 2024-01-31 PROCEDURE — 1160F RVW MEDS BY RX/DR IN RCRD: CPT | Mod: CPTII,S$GLB,, | Performed by: INTERNAL MEDICINE

## 2024-02-01 NOTE — PROGRESS NOTES
Subjective     Patient ID: Vanessa Olsen is a 63 y.o. female.    Chief Complaint: Annual Exam    HPI 63-year-old female presents to clinic today for annual physical she originally had some problems with venous insufficiency more this summer compression.  Stockings helped but they are hard to wear during the hot season.  She reports her symptoms do feel better now.  She is also on weight watchers and has lost around 10 lb since she started in January.  She is very motivated.  Review of Systems  Otherwise negative     Objective     Physical Exam  General: Well-appearing, well-nourished.  No distress  HEENT: conjunctivae are normal.  Pupils are equal and reative to light.  TM's are clear and intact bilaterally.  Hearing is grossly normal.  Nasopharynx is clear.  Oropharynx is clear.  Neck: Supple.  No thyroid megaly.  No bruits.  Lymph: No cervical or supraclavicular adenopathy.  Heart: Regular rate and rhythm, without murmur, rub or gallop.  Lungs: Clear to auscultation; respiratory effort normal.  Abdomen: Soft, nontender, nondistended.  Normoactive bowel sounds.  No hepatomegaly.  No masses.  Extremities: Good distal pulses.  No edema.  Psych: Oriented to time person place.  Judgment and insight seem unimpaired.  Mood and affect are appropriate.     Assessment and Plan     1. Preventative health care  -     CBC Auto Differential; Future; Expected date: 01/31/2024  -     Comprehensive Metabolic Panel; Future; Expected date: 01/31/2024  -     Lipid Panel; Future; Expected date: 01/31/2024  -     TSH; Future; Expected date: 01/31/2024    2. Venous insufficiency        Vanessa was seen today for annual exam.    Diagnoses and all orders for this visit:    Preventative health care  -     CBC Auto Differential; Future  -     Comprehensive Metabolic Panel; Future  -     Lipid Panel; Future  -     TSH; Future  Healthcare maintenance performed, reviewed, updated and counseled today.  Venous insufficiency  Doing well at  this time            Follow up in about 1 year (around 1/31/2025) for physical cbc tsh cmp lipids +/- Psa.

## 2024-04-09 ENCOUNTER — CLINICAL SUPPORT (OUTPATIENT)
Dept: OTHER | Facility: CLINIC | Age: 64
End: 2024-04-09

## 2024-04-09 DIAGNOSIS — Z00.8 ENCOUNTER FOR OTHER GENERAL EXAMINATION: ICD-10-CM

## 2024-04-12 VITALS
BODY MASS INDEX: 35.94 KG/M2 | DIASTOLIC BLOOD PRESSURE: 73 MMHG | HEIGHT: 67 IN | SYSTOLIC BLOOD PRESSURE: 137 MMHG | WEIGHT: 229 LBS

## 2024-04-12 LAB
GLUCOSE SERPL-MCNC: 98 MG/DL (ref 60–140)
HDLC SERPL-MCNC: 47 MG/DL
POC CHOLESTEROL, LDL (DOCK): 76 MG/DL
POC CHOLESTEROL, TOTAL: 154 MG/DL
TRIGL SERPL-MCNC: 183 MG/DL

## 2024-08-07 ENCOUNTER — OFFICE VISIT (OUTPATIENT)
Dept: DERMATOLOGY | Facility: CLINIC | Age: 64
End: 2024-08-07
Payer: COMMERCIAL

## 2024-08-07 DIAGNOSIS — D22.9 MULTIPLE BENIGN NEVI: ICD-10-CM

## 2024-08-07 DIAGNOSIS — D18.01 CHERRY ANGIOMA: ICD-10-CM

## 2024-08-07 DIAGNOSIS — Z12.83 SKIN CANCER SCREENING: ICD-10-CM

## 2024-08-07 DIAGNOSIS — L81.4 LENTIGINES: ICD-10-CM

## 2024-08-07 DIAGNOSIS — L23.9 ALLERGIC CONTACT DERMATITIS, UNSPECIFIED TRIGGER: Primary | ICD-10-CM

## 2024-08-07 DIAGNOSIS — L82.1 SK (SEBORRHEIC KERATOSIS): ICD-10-CM

## 2024-08-07 PROCEDURE — 99203 OFFICE O/P NEW LOW 30 MIN: CPT | Mod: S$GLB,,, | Performed by: DERMATOLOGY

## 2024-08-07 PROCEDURE — 99999 PR PBB SHADOW E&M-EST. PATIENT-LVL III: CPT | Mod: PBBFAC,,, | Performed by: DERMATOLOGY

## 2024-08-07 PROCEDURE — 1160F RVW MEDS BY RX/DR IN RCRD: CPT | Mod: CPTII,S$GLB,, | Performed by: DERMATOLOGY

## 2024-08-07 PROCEDURE — 1159F MED LIST DOCD IN RCRD: CPT | Mod: CPTII,S$GLB,, | Performed by: DERMATOLOGY

## 2024-08-07 RX ORDER — CLOBETASOL PROPIONATE 0.5 MG/ML
SOLUTION TOPICAL
Qty: 50 ML | Refills: 3 | Status: SHIPPED | OUTPATIENT
Start: 2024-08-07

## 2024-08-08 ENCOUNTER — PATIENT MESSAGE (OUTPATIENT)
Dept: OBSTETRICS AND GYNECOLOGY | Facility: CLINIC | Age: 64
End: 2024-08-08
Payer: COMMERCIAL

## 2024-08-08 ENCOUNTER — TELEPHONE (OUTPATIENT)
Dept: OBSTETRICS AND GYNECOLOGY | Facility: CLINIC | Age: 64
End: 2024-08-08
Payer: COMMERCIAL

## 2024-08-08 DIAGNOSIS — Z12.31 ENCOUNTER FOR SCREENING MAMMOGRAM FOR MALIGNANT NEOPLASM OF BREAST: Primary | ICD-10-CM

## 2024-09-18 ENCOUNTER — PATIENT MESSAGE (OUTPATIENT)
Dept: OPTOMETRY | Facility: CLINIC | Age: 64
End: 2024-09-18
Payer: COMMERCIAL

## 2024-09-23 ENCOUNTER — HOSPITAL ENCOUNTER (OUTPATIENT)
Dept: RADIOLOGY | Facility: HOSPITAL | Age: 64
Discharge: HOME | End: 2024-09-23
Attending: OBSTETRICS & GYNECOLOGY
Payer: COMMERCIAL

## 2024-09-23 DIAGNOSIS — Z12.31 ENCOUNTER FOR SCREENING MAMMOGRAM FOR MALIGNANT NEOPLASM OF BREAST: ICD-10-CM

## 2024-09-23 PROCEDURE — 77063 BREAST TOMOSYNTHESIS BI: CPT | Mod: 26,,, | Performed by: RADIOLOGY

## 2024-09-23 PROCEDURE — 77067 SCR MAMMO BI INCL CAD: CPT | Mod: 26,,, | Performed by: RADIOLOGY

## 2024-09-23 PROCEDURE — 77067 SCR MAMMO BI INCL CAD: CPT | Mod: TC

## 2024-09-26 ENCOUNTER — TELEPHONE (OUTPATIENT)
Dept: OPHTHALMOLOGY | Facility: CLINIC | Age: 64
End: 2024-09-26
Payer: COMMERCIAL

## 2024-09-26 ENCOUNTER — OFFICE VISIT (OUTPATIENT)
Dept: OPTOMETRY | Facility: CLINIC | Age: 64
End: 2024-09-26
Payer: COMMERCIAL

## 2024-09-26 DIAGNOSIS — H43.811 POSTERIOR VITREOUS DETACHMENT OF RIGHT EYE: ICD-10-CM

## 2024-09-26 DIAGNOSIS — H35.341 MACULAR HOLE, RIGHT: Primary | ICD-10-CM

## 2024-09-26 PROCEDURE — 92014 COMPRE OPH EXAM EST PT 1/>: CPT | Mod: S$GLB,,, | Performed by: OPTOMETRIST

## 2024-09-26 PROCEDURE — 99999 PR PBB SHADOW E&M-EST. PATIENT-LVL II: CPT | Mod: PBBFAC,,, | Performed by: OPTOMETRIST

## 2024-09-26 PROCEDURE — 1160F RVW MEDS BY RX/DR IN RCRD: CPT | Mod: CPTII,S$GLB,, | Performed by: OPTOMETRIST

## 2024-09-26 PROCEDURE — 92134 CPTRZ OPH DX IMG PST SGM RTA: CPT | Mod: S$GLB,,, | Performed by: OPTOMETRIST

## 2024-09-26 PROCEDURE — 1159F MED LIST DOCD IN RCRD: CPT | Mod: CPTII,S$GLB,, | Performed by: OPTOMETRIST

## 2024-09-26 NOTE — PROGRESS NOTES
"HPI     Eye Problem     Additional comments: Blur od at dist, x mos, no assoc pain or red, no   relief over time, constant           Comments    Pt here for annual eye exam DLS- 12/15/23    Pt has had some changes in vision mostly with intermediated/computer seems   like that decreased overnight. Pt sts the day after labor day vertical   lines has a "c" shaped curve in them, pt is still seeing that C shaped   curve in vertical lines.     Using Restasis BID OU.   Denies F/F          Last edited by Eloy Mojica, OD on 9/26/2024  2:42 PM.            Assessment /Plan     For exam results, see Encounter Report.    Macular hole, right    Posterior vitreous detachment of right eye      1,2. 3 week history, Refer to Derrick for eval and treat, pt lives on [address].                    "

## 2024-09-26 NOTE — TELEPHONE ENCOUNTER
----- Message from Estevan Richards sent at 9/26/2024  3:10 PM CDT -----  Regarding: Appt  Contact: [phone]  Patient is calling to schedule appointment no dates in epic. Please contact pt

## 2024-10-01 ENCOUNTER — CLINICAL SUPPORT (OUTPATIENT)
Dept: OPHTHALMOLOGY | Facility: CLINIC | Age: 64
End: 2024-10-01
Payer: COMMERCIAL

## 2024-10-01 ENCOUNTER — TELEPHONE (OUTPATIENT)
Dept: OPHTHALMOLOGY | Facility: CLINIC | Age: 64
End: 2024-10-01

## 2024-10-01 ENCOUNTER — OFFICE VISIT (OUTPATIENT)
Dept: OPHTHALMOLOGY | Facility: CLINIC | Age: 64
End: 2024-10-01
Payer: COMMERCIAL

## 2024-10-01 DIAGNOSIS — H35.341 MACULAR HOLE, RIGHT: Primary | ICD-10-CM

## 2024-10-01 DIAGNOSIS — H35.341 FULL THICKNESS MACULAR HOLE, RIGHT: ICD-10-CM

## 2024-10-01 DIAGNOSIS — H43.821 VITREOMACULAR ADHESION, RIGHT: ICD-10-CM

## 2024-10-01 DIAGNOSIS — H35.341 LAMELLAR MACULAR HOLE OF RIGHT EYE: Primary | ICD-10-CM

## 2024-10-01 PROCEDURE — 1159F MED LIST DOCD IN RCRD: CPT | Mod: CPTII,S$GLB,, | Performed by: OPHTHALMOLOGY

## 2024-10-01 PROCEDURE — 1160F RVW MEDS BY RX/DR IN RCRD: CPT | Mod: CPTII,S$GLB,, | Performed by: OPHTHALMOLOGY

## 2024-10-01 PROCEDURE — 99999 PR PBB SHADOW E&M-EST. PATIENT-LVL III: CPT | Mod: PBBFAC,,, | Performed by: OPHTHALMOLOGY

## 2024-10-01 PROCEDURE — 92004 COMPRE OPH EXAM NEW PT 1/>: CPT | Mod: S$GLB,,, | Performed by: OPHTHALMOLOGY

## 2024-10-01 PROCEDURE — 92201 OPSCPY EXTND RTA DRAW UNI/BI: CPT | Mod: S$GLB,,, | Performed by: OPHTHALMOLOGY

## 2024-10-01 PROCEDURE — 92134 CPTRZ OPH DX IMG PST SGM RTA: CPT | Mod: S$GLB,,, | Performed by: OPHTHALMOLOGY

## 2024-10-01 NOTE — PROGRESS NOTES
HPI     new patient   to retina  clinic      Additional comments: New patient  to retina clinic   Mac hole   OD   Ref  BY DR Mojica    Pt c/o floaters and lines in va ou   PVD OD     Dls  09/26/24          OCT - OD VMT with FTMH OD  OS no ME      A/P    [facility] from VMT OD  Sx began 9/24    Plan 25g PPV/ILM peel/SF6 OD    Local MAC  LOC 40 min    Risks, benefits, and alternatives to treatment discussed in detail with the patient.  The patient voiced understanding and wished to proceed with the procedure      2. Early NS OU

## 2024-10-02 ENCOUNTER — PATIENT MESSAGE (OUTPATIENT)
Dept: OPHTHALMOLOGY | Facility: CLINIC | Age: 64
End: 2024-10-02
Payer: COMMERCIAL

## 2024-11-04 ENCOUNTER — ANESTHESIA EVENT (OUTPATIENT)
Dept: SURGERY | Facility: HOSPITAL | Age: 64
End: 2024-11-04
Payer: COMMERCIAL

## 2024-11-04 ENCOUNTER — PATIENT MESSAGE (OUTPATIENT)
Dept: OPHTHALMOLOGY | Facility: CLINIC | Age: 64
End: 2024-11-04
Payer: COMMERCIAL

## 2024-11-04 NOTE — PRE-PROCEDURE INSTRUCTIONS
PreOp Instructions given:   - Verbal medication information (what to hold and what to take)   - NPO guidelines   Patients should stop full meals at midnight, but they can consume clear liquids up to 1.5 hours prior to scheduled arrival time.  Clear liquids include Gatorade, water, soda, black coffee or tea (no milk or creamer), and clear juices.  Clear liquids do NOT include anything with pulp or food particles (such as chicken broth, ice cream, yogurt, Jello, etc.)   - Arrival place directions given; time to be given the day before procedure by the   Surgeon's Office [facility]  - Bathing with antibacterial soap   - Don't wear any jewelry or bring any valuables AM of surgery   - No makeup or moisturizer to face   - No perfume/cologne, powder, lotions or aftershave   Pt. verbalized understanding.   Pt denies any h/o Anesthesia/Sedation complications or side effects.  Patient does not know arrival time.  Explained that this information comes from the surgeon's office and if they haven't heard from them by 2 or 3 pm to call the office.  Patient stated an understanding.

## 2024-11-04 NOTE — ANESTHESIA PREPROCEDURE EVALUATION
Pre-operative evaluation for Procedure(s) (LRB):  VITRECTOMY,MECHANICAL,PARS PLANA APPROACH,WITH REMOVAL OF INTERNAL LIMITING MEMBRANE OF RETINA (Right)    Vanessa Olsen is a 64 y.o. female w/ macular hole of right eye who presents for above procedure.   Plan for MAC w/ retrobulbar block.       Prev airway (7/28/2021):     Induction:  Intravenous    Intubated:  Postinduction    Mask Ventilation:  Easy mask    Attempts:  1    Attempted By:  CRNA    Method of Intubation:  Video laryngoscopy    Blade:  Vincent 3    Laryngeal View Grade: Grade I - full view of chords      Difficult Airway Encountered?: No      Complications:  None    Airway Device:  Oral endotracheal tube    Airway Device Size:  7.0    Style/Cuff Inflation:  Cuffed    Tube secured:  21    Secured at:  The lips    Placement Verified By:  Capnometry    Complicating Factors:  None    Findings Post-Intubation:  BS equal bilateral and atraumatic/condition of teeth unchanged      2D Echo: none on record      EKG: none on record        Patient Active Problem List   Diagnosis    Bilateral dry eyes - Both Eyes    Family history of colonic polyps    History of adenomatous polyp of colon    Seasonal allergies    Venous insufficiency    Vitreomacular adhesion, right    Full thickness macular hole, right       Review of patient's allergies indicates:  No Known Allergies    Past Surgical History:   Procedure Laterality Date    BREAST BIOPSY Left 2000    benign  (11:00  o'clock ?? )     CHOLECYSTECTOMY  2004    COLONOSCOPY N/A 3/5/2018    Procedure: COLONOSCOPY;  Surgeon: Francisco Du MD;  Location: [address]);  Service: Endoscopy;  Laterality: N/A;  3 year f/u - history of colon polyps - ER    COLONOSCOPY N/A 3/27/2023    Procedure: COLONOSCOPY;  Surgeon: Francisco Du MD;  Location: [facility] ([address] [place] [address]);  Service: Endoscopy;  Laterality: N/A;  Pt request Suprep , instr via portal - PC  pre-call bm 03/21.    CYSTOSCOPY N/A 7/28/2021     "Procedure: CYSTOSCOPY;  Surgeon: Isaura aCssidy MD;  Location: [facility];  Service: OB/GYN;  Laterality: N/A;    HYSTERECTOMY  07/28/2021    HYSTEROSCOPY N/A 4/6/2021    Procedure: HYSTEROSCOPY;  Surgeon: Isaura Cassidy MD;  Location: [facility];  Service: OB/GYN;  Laterality: N/A;    ROBOT-ASSISTED LAPAROSCOPIC ABDOMINAL HYSTERECTOMY USING DA VANESSA XI Bilateral 7/28/2021    Procedure: XI ROBOTIC HYSTERECTOMY;  Surgeon: Isaura Cassidy MD;  Location: [facility];  Service: OB/GYN;  Laterality: Bilateral;  BSO    ROBOT-ASSISTED LAPAROSCOPIC SALPINGO-OOPHORECTOMY Bilateral 7/28/2021    Procedure: ROBOTIC SALPINGO-OOPHORECTOMY;  Surgeon: Isaura Cassidy MD;  Location: [facility];  Service: OB/GYN;  Laterality: Bilateral;    WISDOM TOOTH EXTRACTION           Vital Signs:         CBC: No results for input(s): "WBC", "RBC", "HGB", "HCT", "PLT", "MCV", "MCH", "MCHC" in the last 72 hours.    CMP: No results for input(s): "NA", "K", "CL", "CO2", "BUN", "CREATININE", "GLU", "MG", "PHOS", "CALCIUM", "ALBUMIN", "PROT", "ALKPHOS", "ALT", "AST", "BILITOT" in the last 72 hours.    INR  No results for input(s): "PT", "INR", "PROTIME", "APTT" in the last 72 hours.        Pre-op Assessment    I have reviewed the Patient Summary Reports.     I have reviewed the Nursing Notes. I have reviewed the NPO Status.   I have reviewed the Medications.     Review of Systems  Anesthesia Hx:  No problems with previous Anesthesia             Denies Family Hx of Anesthesia complications.   Personal Hx of Anesthesia complications, Post-Operative Nausea/Vomiting, in the past, but not with recent anesthetics / prophylaxis                    Hematology/Oncology:    Oncology Normal                                   Cardiovascular:  Cardiovascular Normal Exercise tolerance: good   Denies Pacemaker.   Denies Valvular problems/Murmurs.   Denies CABG/stent.                                       Pulmonary:  Pulmonary Normal    Denies Asthma.                 "    Renal/:  Renal/ Normal                 Hepatic/GI:  Hepatic/GI Normal                    Neurological:  Neurology Normal      Denies Seizures.                                Endocrine:  Endocrine Normal                Physical Exam  General: Alert and Oriented    Airway:  Mallampati: II   Mouth Opening: Normal  TM Distance: Normal  Tongue: Normal    Dental:  Intact    Chest/Lungs:  Clear to auscultation, Normal Respiratory Rate    Heart:  Rate: Normal  Rhythm: Regular Rhythm        Anesthesia Plan  Type of Anesthesia, risks & benefits discussed:    Anesthesia Type: MAC  Intra-op Monitoring Plan: Standard ASA Monitors  Post Op Pain Control Plan: IV/PO Opioids PRN and multimodal analgesia  Induction:  IV  Informed Consent: Informed consent signed with the Patient and all parties understand the risks and agree with anesthesia plan.  All questions answered.   ASA Score: 1  Day of Surgery Review of History & Physical: H&P Update referred to the surgeon/provider.    Ready For Surgery From Anesthesia Perspective.     .

## 2024-11-05 ENCOUNTER — TELEPHONE (OUTPATIENT)
Dept: OPHTHALMOLOGY | Facility: CLINIC | Age: 64
End: 2024-11-05
Payer: COMMERCIAL

## 2024-11-05 NOTE — H&P
Pre-Operative History & Physical  Ophthalmology      SUBJECTIVE:     History of Present Illness:  Patient is a 64 y.o. female presents with Lamellar macular hole of right eye [H35.341].    MEDICATIONS:   No medications prior to admission.       ALLERGIES: Review of patient's allergies indicates:  No Known Allergies    PAST MEDICAL HISTORY:   Past Medical History:   Diagnosis Date    Allergy     Breast disorder 2000    Left Breast Bx - Benign     Cataract     Fracture, ribs     H/O cold sores     History of robot-assisted laparoscopic hysterectomy 07/28/2021    Hormone replacement therapy (HRT)     Menopause 2013    PONV (postoperative nausea and vomiting)     Tennis elbow 2023    PT     PAST SURGICAL HISTORY:   Past Surgical History:   Procedure Laterality Date    BREAST BIOPSY Left 2000    benign  (11:00  o'clock ?? )     CHOLECYSTECTOMY  2004    COLONOSCOPY N/A 3/5/2018    Procedure: COLONOSCOPY;  Surgeon: Francisco Du MD;  [facility]  Service: Endoscopy;  Laterality: N/A;  3 year f/u - history of colon polyps - ER    COLONOSCOPY N/A 3/27/2023    Procedure: COLONOSCOPY;  Surgeon: Francisco Du MD;  [facility]  Service: Endoscopy;  Laterality: N/A;  Pt request Suprep , instr via portal - PC  pre-call bm 03/21.    CYSTOSCOPY N/A 7/28/2021    Procedure: CYSTOSCOPY;  Surgeon: Isaura Cassidy MD;  Location: [facility];  Service: OB/GYN;  Laterality: N/A;    HYSTERECTOMY  07/28/2021    HYSTEROSCOPY N/A 4/6/2021    Procedure: HYSTEROSCOPY;  Surgeon: Isaura Cassidy MD;  Location: [facility];  Service: OB/GYN;  Laterality: N/A;    ROBOT-ASSISTED LAPAROSCOPIC ABDOMINAL HYSTERECTOMY USING DA VANESSA XI Bilateral 7/28/2021    Procedure: XI ROBOTIC HYSTERECTOMY;  Surgeon: Isaura Cassidy MD;  Location: [facility];  Service: OB/GYN;  Laterality: Bilateral;  BSO    ROBOT-ASSISTED LAPAROSCOPIC SALPINGO-OOPHORECTOMY Bilateral 7/28/2021    Procedure: ROBOTIC SALPINGO-OOPHORECTOMY;   Surgeon: Isaura Cassidy MD;  Location: [facility];  Service: OB/GYN;  Laterality: Bilateral;    WISDOM TOOTH EXTRACTION       PAST FAMILY HISTORY:   Family History   Problem Relation Name Age of Onset    Macular degeneration Mother      Cataracts Mother      Hypertension Mother      Heart disease Mother          at fib and pacemaker    Hypertension Father      Colon polyps Father  82        large poly needing surgery    Pulmonary embolism Father      Glaucoma Brother      Hypertension Brother      Colon polyps Brother  58        several advanced colon adenomas    Lung cancer Brother      Breast cancer Paternal Aunt      Prostate cancer Paternal Uncle      Breast cancer Paternal Cousin      Amblyopia Neg Hx      Blindness Neg Hx      Diabetes Neg Hx      Retinal detachment Neg Hx      Strabismus Neg Hx      Stroke Neg Hx      Thyroid disease Neg Hx      Celiac disease Neg Hx      Cirrhosis Neg Hx      Colon cancer Neg Hx      Esophageal cancer Neg Hx      Inflammatory bowel disease Neg Hx      Liver disease Neg Hx      Rectal cancer Neg Hx      Stomach cancer Neg Hx      Liver cancer Neg Hx      Ulcerative colitis Neg Hx      Ovarian cancer Neg Hx      Cancer Neg Hx      Uterine cancer Neg Hx      Cervical cancer Neg Hx      Melanoma Neg Hx       SOCIAL HISTORY:   Social History     Tobacco Use    Smoking status: Never    Smokeless tobacco: Never   Substance Use Topics    Alcohol use: Yes     Comment: socially    Drug use: No        MENTAL STATUS: Alert    REVIEW OF SYSTEMS: Negative    OBJECTIVE:     Vital Signs (Most Recent)       Physical Exam:  General: NAD  HEENT: AT/NC, FTMH OD  Lungs: Adequate respirations  Heart: + pulses  Abdomen: Soft    ASSESSMENT/PLAN:     Patient is a 64 y.o. female with Lamellar macular hole of right eye [H35.341]      - Risks/benefits/alternatives of the procedure including, but not limited to, infection, bleeding, pain, ptosis, macular edema, corneal edema, persistent corneal defect,  retinal tears, retinal detachment, epiretinal membrane, elevated intraocular pressure, hypotony, cataract formation, possible need for strict post-op head positioning, possible temporary avoidance of air travel, loss of vision, loss of the eye, paralysis, and death were discussed with the patient and/or family. The patient/family voiced good understanding, the informed consent was signed, witnessed, and placed in chart. All patient and family questions were answered.   - Will proceed with 25g PPV/ILM peel/SF6 OD  - Plan for MAC with retrobulbar block anesthesia   - Allergies reviewed: Review of patient's allergies indicates:  No Known Allergies      Supplies Needed  Retrobulbar block, 25 gauge vitrectomy, Tissue Blue, Macular lens, ILM Forceps, Soft tip cannula, and Gas pack      Connor Trammell MD  Vitreoretinal Surgery   Department of Ophthalmology

## 2024-11-06 ENCOUNTER — ANESTHESIA (OUTPATIENT)
Dept: SURGERY | Facility: HOSPITAL | Age: 64
End: 2024-11-06
Payer: COMMERCIAL

## 2024-11-06 ENCOUNTER — HOSPITAL ENCOUNTER (OUTPATIENT)
Facility: HOSPITAL | Age: 64
Discharge: HOME | End: 2024-11-06
Attending: OPHTHALMOLOGY | Admitting: OPHTHALMOLOGY
Payer: COMMERCIAL

## 2024-11-06 VITALS
RESPIRATION RATE: 18 BRPM | OXYGEN SATURATION: 97 % | DIASTOLIC BLOOD PRESSURE: 51 MMHG | HEART RATE: 69 BPM | SYSTOLIC BLOOD PRESSURE: 112 MMHG | TEMPERATURE: 98 F

## 2024-11-06 DIAGNOSIS — H35.341 FULL THICKNESS MACULAR HOLE, RIGHT: Primary | ICD-10-CM

## 2024-11-06 PROCEDURE — 25000003 PHARM REV CODE 250: Performed by: OPHTHALMOLOGY

## 2024-11-06 PROCEDURE — 37000008 HC ANESTHESIA 1ST 15 MINUTES: Performed by: OPHTHALMOLOGY

## 2024-11-06 PROCEDURE — 37000009 HC ANESTHESIA EA ADD 15 MINS: Performed by: OPHTHALMOLOGY

## 2024-11-06 PROCEDURE — 36000707: Performed by: OPHTHALMOLOGY

## 2024-11-06 PROCEDURE — 63600175 PHARM REV CODE 636 W HCPCS

## 2024-11-06 PROCEDURE — 71000044 HC DOSC ROUTINE RECOVERY FIRST HOUR: Performed by: OPHTHALMOLOGY

## 2024-11-06 PROCEDURE — 67042 VIT FOR MACULAR HOLE: CPT | Mod: RT,,, | Performed by: OPHTHALMOLOGY

## 2024-11-06 PROCEDURE — 25000003 PHARM REV CODE 250: Performed by: STUDENT IN AN ORGANIZED HEALTH CARE EDUCATION/TRAINING PROGRAM

## 2024-11-06 PROCEDURE — 27201423 OPTIME MED/SURG SUP & DEVICES STERILE SUPPLY: Performed by: OPHTHALMOLOGY

## 2024-11-06 PROCEDURE — 71000015 HC POSTOP RECOV 1ST HR: Performed by: OPHTHALMOLOGY

## 2024-11-06 PROCEDURE — C1784 OCULAR DEV, INTRAOP, DET RET: HCPCS | Performed by: OPHTHALMOLOGY

## 2024-11-06 PROCEDURE — 63600175 PHARM REV CODE 636 W HCPCS: Performed by: OPHTHALMOLOGY

## 2024-11-06 PROCEDURE — 36000706: Performed by: OPHTHALMOLOGY

## 2024-11-06 PROCEDURE — 25000003 PHARM REV CODE 250

## 2024-11-06 RX ORDER — OXYCODONE AND ACETAMINOPHEN 5; 325 MG/1; MG/1
1 TABLET ORAL EVERY 6 HOURS PRN
Qty: 12 TABLET | Refills: 0 | Status: SHIPPED | OUTPATIENT
Start: 2024-11-06

## 2024-11-06 RX ORDER — TETRACAINE HYDROCHLORIDE 5 MG/ML
1 SOLUTION OPHTHALMIC
Status: DISCONTINUED | OUTPATIENT
Start: 2024-11-06 | End: 2024-11-06 | Stop reason: HOSPADM

## 2024-11-06 RX ORDER — VANCOMYCIN HYDROCHLORIDE 500 MG/10ML
INJECTION, POWDER, LYOPHILIZED, FOR SOLUTION INTRAVENOUS
Status: DISCONTINUED
Start: 2024-11-06 | End: 2024-11-06 | Stop reason: HOSPADM

## 2024-11-06 RX ORDER — FENTANYL CITRATE 50 UG/ML
25 INJECTION, SOLUTION INTRAMUSCULAR; INTRAVENOUS EVERY 5 MIN PRN
Status: DISCONTINUED | OUTPATIENT
Start: 2024-11-06 | End: 2024-11-06 | Stop reason: HOSPADM

## 2024-11-06 RX ORDER — DEXAMETHASONE SODIUM PHOSPHATE 4 MG/ML
INJECTION, SOLUTION INTRA-ARTICULAR; INTRALESIONAL; INTRAMUSCULAR; INTRAVENOUS; SOFT TISSUE
Status: DISCONTINUED
Start: 2024-11-06 | End: 2024-11-06 | Stop reason: HOSPADM

## 2024-11-06 RX ORDER — SODIUM CHLORIDE 0.9 % (FLUSH) 0.9 %
10 SYRINGE (ML) INJECTION
Status: DISCONTINUED | OUTPATIENT
Start: 2024-11-06 | End: 2024-11-06 | Stop reason: HOSPADM

## 2024-11-06 RX ORDER — PHENYLEPHRINE HYDROCHLORIDE 25 MG/ML
1 SOLUTION/ DROPS OPHTHALMIC
Status: DISCONTINUED | OUTPATIENT
Start: 2024-11-06 | End: 2024-11-06 | Stop reason: HOSPADM

## 2024-11-06 RX ORDER — LIDOCAINE HYDROCHLORIDE 20 MG/ML
INJECTION, SOLUTION EPIDURAL; INFILTRATION; INTRACAUDAL; PERINEURAL
Status: DISCONTINUED
Start: 2024-11-06 | End: 2024-11-06 | Stop reason: HOSPADM

## 2024-11-06 RX ORDER — ONDANSETRON 4 MG/1
4 TABLET, FILM COATED ORAL EVERY 8 HOURS PRN
Qty: 12 TABLET | Refills: 0 | Status: SHIPPED | OUTPATIENT
Start: 2024-11-06

## 2024-11-06 RX ORDER — PREDNISOLONE ACETATE 10 MG/ML
1 SUSPENSION/ DROPS OPHTHALMIC
Status: DISCONTINUED | OUTPATIENT
Start: 2024-11-06 | End: 2024-11-06 | Stop reason: HOSPADM

## 2024-11-06 RX ORDER — HYDROCODONE BITARTRATE AND ACETAMINOPHEN 5; 325 MG/1; MG/1
1 TABLET ORAL EVERY 4 HOURS PRN
Status: DISCONTINUED | OUTPATIENT
Start: 2024-11-06 | End: 2024-11-06 | Stop reason: HOSPADM

## 2024-11-06 RX ORDER — PROPOFOL 10 MG/ML
VIAL (ML) INTRAVENOUS
Status: DISCONTINUED | OUTPATIENT
Start: 2024-11-06 | End: 2024-11-06

## 2024-11-06 RX ORDER — MOXIFLOXACIN 5 MG/ML
1 SOLUTION/ DROPS OPHTHALMIC
Status: DISCONTINUED | OUTPATIENT
Start: 2024-11-06 | End: 2024-11-06 | Stop reason: HOSPADM

## 2024-11-06 RX ORDER — HYDROCODONE BITARTRATE AND ACETAMINOPHEN 5; 325 MG/1; MG/1
TABLET ORAL
Status: COMPLETED
Start: 2024-11-06 | End: 2024-11-06

## 2024-11-06 RX ORDER — ONDANSETRON HYDROCHLORIDE 2 MG/ML
INJECTION, SOLUTION INTRAVENOUS
Status: DISCONTINUED | OUTPATIENT
Start: 2024-11-06 | End: 2024-11-06

## 2024-11-06 RX ORDER — LIDOCAINE HYDROCHLORIDE 20 MG/ML
INJECTION INTRAVENOUS
Status: DISCONTINUED | OUTPATIENT
Start: 2024-11-06 | End: 2024-11-06

## 2024-11-06 RX ORDER — ATROPINE SULFATE 10 MG/ML
1 SOLUTION/ DROPS OPHTHALMIC
Status: DISCONTINUED | OUTPATIENT
Start: 2024-11-06 | End: 2024-11-06 | Stop reason: HOSPADM

## 2024-11-06 RX ORDER — FENTANYL CITRATE 50 UG/ML
INJECTION, SOLUTION INTRAMUSCULAR; INTRAVENOUS
Status: DISCONTINUED | OUTPATIENT
Start: 2024-11-06 | End: 2024-11-06

## 2024-11-06 RX ORDER — FEXOFENADINE HCL 60 MG/1
60 TABLET, FILM COATED ORAL DAILY
COMMUNITY

## 2024-11-06 RX ORDER — ACETAMINOPHEN 325 MG/1
650 TABLET ORAL EVERY 4 HOURS PRN
Status: DISCONTINUED | OUTPATIENT
Start: 2024-11-06 | End: 2024-11-06 | Stop reason: HOSPADM

## 2024-11-06 RX ORDER — GLUCAGON 1 MG
1 KIT INJECTION
Status: DISCONTINUED | OUTPATIENT
Start: 2024-11-06 | End: 2024-11-06 | Stop reason: HOSPADM

## 2024-11-06 RX ORDER — VANCOMYCIN HYDROCHLORIDE 500 MG/10ML
INJECTION, POWDER, LYOPHILIZED, FOR SOLUTION INTRAVENOUS
Status: DISCONTINUED | OUTPATIENT
Start: 2024-11-06 | End: 2024-11-06 | Stop reason: HOSPADM

## 2024-11-06 RX ORDER — NEOMYCIN SULFATE, POLYMYXIN B SULFATE, AND DEXAMETHASONE 3.5; 10000; 1 MG/G; [USP'U]/G; MG/G
OINTMENT OPHTHALMIC
Status: DISCONTINUED
Start: 2024-11-06 | End: 2024-11-06 | Stop reason: HOSPADM

## 2024-11-06 RX ORDER — MIDAZOLAM HYDROCHLORIDE 1 MG/ML
INJECTION INTRAMUSCULAR; INTRAVENOUS
Status: DISCONTINUED | OUTPATIENT
Start: 2024-11-06 | End: 2024-11-06

## 2024-11-06 RX ORDER — EPINEPHRINE 1 MG/ML
INJECTION, SOLUTION, CONCENTRATE INTRAVENOUS
Status: DISCONTINUED
Start: 2024-11-06 | End: 2024-11-06 | Stop reason: HOSPADM

## 2024-11-06 RX ADMIN — MOXIFLOXACIN OPHTHALMIC 1 DROP: 5 SOLUTION/ DROPS OPHTHALMIC at 07:11

## 2024-11-06 RX ADMIN — FENTANYL CITRATE 50 MCG: 50 INJECTION, SOLUTION INTRAMUSCULAR; INTRAVENOUS at 10:11

## 2024-11-06 RX ADMIN — FENTANYL CITRATE 25 MCG: 50 INJECTION, SOLUTION INTRAMUSCULAR; INTRAVENOUS at 10:11

## 2024-11-06 RX ADMIN — ATROPINE SULFATE 1 DROP: 10 SOLUTION/ DROPS OPHTHALMIC at 07:11

## 2024-11-06 RX ADMIN — TETRACAINE HYDROCHLORIDE 1 DROP: 5 SOLUTION OPHTHALMIC at 07:11

## 2024-11-06 RX ADMIN — LIDOCAINE HYDROCHLORIDE 100 MG: 20 INJECTION INTRAVENOUS at 09:11

## 2024-11-06 RX ADMIN — HYDROCODONE BITARTRATE AND ACETAMINOPHEN 1 TABLET: 5; 325 TABLET ORAL at 11:11

## 2024-11-06 RX ADMIN — PHENYLEPHRINE HYDROCHLORIDE 1 DROP: 25 SOLUTION/ DROPS OPHTHALMIC at 07:11

## 2024-11-06 RX ADMIN — ONDANSETRON 4 MG: 2 INJECTION INTRAMUSCULAR; INTRAVENOUS at 10:11

## 2024-11-06 RX ADMIN — PROPOFOL 70 MG: 10 INJECTION, EMULSION INTRAVENOUS at 09:11

## 2024-11-06 RX ADMIN — PREDNISOLONE ACETATE 1 DROP: 10 SUSPENSION/ DROPS OPHTHALMIC at 07:11

## 2024-11-06 RX ADMIN — FENTANYL CITRATE 25 MCG: 50 INJECTION, SOLUTION INTRAMUSCULAR; INTRAVENOUS at 09:11

## 2024-11-06 RX ADMIN — MIDAZOLAM HYDROCHLORIDE 2 MG: 2 INJECTION, SOLUTION INTRAMUSCULAR; INTRAVENOUS at 09:11

## 2024-11-06 NOTE — TRANSFER OF CARE
Anesthesia Transfer of Care Note    Patient: Vanessa LOPEZ Ponthieux    Procedure(s) Performed: Procedure(s) (LRB):  VITRECTOMY,MECHANICAL,PARS PLANA APPROACH,WITH REMOVAL OF INTERNAL LIMITING MEMBRANE OF RETINA (Right)    Patient location: PACU    Anesthesia Type: MAC    Transport from OR: Transported from OR on room air with adequate spontaneous ventilation    Post pain: adequate analgesia    Post assessment: no apparent anesthetic complications and tolerated procedure well    Post vital signs: stable    Level of consciousness: awake, alert and oriented    Nausea/Vomiting: no nausea/vomiting    Complications: none    Transfer of care protocol was followed      Last vitals: Visit Vitals  /60 (BP Location: Left arm, Patient Position: Lying)   Pulse 60   Temp 36.1 °C (97 °F) (Temporal)   Resp 16   LMP 01/01/2013   SpO2 95%   Breastfeeding No

## 2024-11-06 NOTE — BRIEF OP NOTE
Preoperative diagnosis: Full thickness macular hole OD    Post op Dx: same    Procedure performed:  25g PPV/ILM peel/EL/AFx/SF6 20% OD     Attending Surgeon: Derrick    Assistant Surgeon: Valeri    Anesthesia: Local/Mac, retrobulbar injection of 4.0cc mixture 0.75%Marcaine, 2% Xylocaine    Estimated blood loss: Minimal    Complication: None    Specimen: None    Disposition: Stable to recovery    Findings/Outcome: [facility]TM[facility] OD, small VR tuft in lattice at 12:00 - EL applied    Date of Discharge: 11/6/24    Discharge Disposition: stable to recovery then home    F/U: tomorrow

## 2024-11-06 NOTE — DISCHARGE SUMMARY
[facility] - [facility] (1st Fl)  Discharge Note  Short Stay    Procedure(s) (LRB):  VITRECTOMY,MECHANICAL,PARS PLANA APPROACH,WITH REMOVAL OF INTERNAL LIMITING MEMBRANE OF RETINA (Right)      OUTCOME: Patient tolerated treatment/procedure well without complication and is now ready for discharge.    DISPOSITION: Home or Self Care    FINAL DIAGNOSIS:  [facility] OD    FOLLOWUP: In clinic    DISCHARGE INSTRUCTIONS:  No discharge procedures on file.     TIME SPENT ON DISCHARGE:    minutes

## 2024-11-06 NOTE — OP NOTE
Preoperative diagnosis: Full thickness macular hole OD    Post op Dx: same with superior VR tuft    Procedure performed:  25g PPV/ILM peel/ENdolaser/AFx/SF6 20% OD     Attending surgeons:  MAGED Meza    Anesthesia:  Local/MAC, with retrobulbar injection 4.0cc mixture of 2% lidocaine, 0.75% marcaine    Estimated blood loss: minimal    Complications:  None    DOS: 11/6/24    Disposition: stable to recovery then home    Indications for surgery:   This is a 63 yo patient who presented with  distortion and blurred central vision the right eye.   OCT examination patient revealed a macular hole.  Decision was made to repair the macular hole to improved vision and quality of life. Risks, benefits, and alternatives to surgery were discussed in detail. Risks including loss of vision, loss of eye, retina detachment, hemorrhage, infection, lens dislocation, glaucoma, hypotony, ptosis, diplopia    Description of procedure:  After proper informed consent was obtained, the patient was brought back to the operating room at [facility] where monitored anesthesia care was induced.  A retrobulbar injection was provided above without complication.  The patient is prepped draped in normal sterile fashion for ophthalmic surgery and a lid speculum was placed in the right eye.  A standard 3 port 25 gauge pars plana vitrectomy setup with the infusion cannula inserted 4.0 mm posterior to the limbus.  The infusion cannula was turned after it was observed free and clear of all underlying tissue.  Super nasal and superotemporal trocars were also placed 4.0 mm posterior to the limbus.  The vitrector and light pipe were introduced in the vitreous cavity and a core vitrectomy was performed.  Posterior hyaloid  was elevated and propagated out the level of the equator.   TIssue blue was used to stain the epiretinal membrane and internal limiting membrane complex.  Both were peeled off under direct contact lens visualization with  the ILM forceps.  A 360 degree cortical vitrectomy was performed, a small lattice patch with a vitreoretinal tuft was found at 12:00 - endolaser was applied. air-fluid exchange was performed the SN trocar was removed and not leaking after gentle massage. a 20% SF6 gas mixture was created.  Approximately 40 cc were cycled through the eye.  The superotemporal and infusion trocars removed and not leaking after gentle massage.  The eye was normal pressure via palpation.  Subconjunctival injections of vancomycin and Decadron were given and  the patient's the drapes removed. the patient she was washed free of Betadine prep solution,  Maxitrol ointment was placed in the left eye then patched shielded, mac anesthesia was reversed and she was brought to recovery in stable condition tolerating the procedure well.  Dr. Meza was present for in entire case.

## 2024-11-06 NOTE — ANESTHESIA POSTPROCEDURE EVALUATION
Anesthesia Post Evaluation    Patient: Vanessa LOPEZ Ponthieux    Procedure(s) Performed: Procedure(s) (LRB):  VITRECTOMY,MECHANICAL,PARS PLANA APPROACH,WITH REMOVAL OF INTERNAL LIMITING MEMBRANE OF RETINA (Right)    Final Anesthesia Type: MAC      Patient location during evaluation: PACU  Patient participation: Yes- Able to Participate  Level of consciousness: awake  Post-procedure vital signs: reviewed and stable  Pain management: adequate  Airway patency: patent    PONV status at discharge: No PONV  Anesthetic complications: no      Cardiovascular status: blood pressure returned to baseline  Respiratory status: unassisted, spontaneous ventilation and room air                Vitals Value Taken Time   /51 11/06/24 1116   Temp 36.7 °C (98.1 °F) 11/06/24 1130   Pulse 69 11/06/24 1130   Resp 18 11/06/24 1130   SpO2 95 % 11/06/24 1123   Vitals shown include unfiled device data.      No case tracking events are documented in the log.      Pain/Joan Score: Pain Rating Prior to Med Admin: 6 (11/6/2024 11:21 AM)  Joan Score: 10 (11/6/2024 11:15 AM)

## 2024-11-07 ENCOUNTER — OFFICE VISIT (OUTPATIENT)
Dept: OPHTHALMOLOGY | Facility: CLINIC | Age: 64
End: 2024-11-07
Payer: COMMERCIAL

## 2024-11-07 DIAGNOSIS — H43.821 VITREOMACULAR ADHESION, RIGHT: ICD-10-CM

## 2024-11-07 DIAGNOSIS — H35.341 FULL THICKNESS MACULAR HOLE, RIGHT: Primary | ICD-10-CM

## 2024-11-07 PROCEDURE — 1159F MED LIST DOCD IN RCRD: CPT | Mod: CPTII,S$GLB,, | Performed by: OPHTHALMOLOGY

## 2024-11-07 PROCEDURE — 99024 POSTOP FOLLOW-UP VISIT: CPT | Mod: S$GLB,,, | Performed by: OPHTHALMOLOGY

## 2024-11-07 PROCEDURE — 1160F RVW MEDS BY RX/DR IN RCRD: CPT | Mod: CPTII,S$GLB,, | Performed by: OPHTHALMOLOGY

## 2024-11-07 PROCEDURE — 99999 PR PBB SHADOW E&M-EST. PATIENT-LVL III: CPT | Mod: PBBFAC,,, | Performed by: OPHTHALMOLOGY

## 2024-11-07 NOTE — PROGRESS NOTES
HPI    Pt sts here for post op PPV OD no problems last night   Last edited by Pennie Linares on 11/7/2024  7:51 AM.          OCT - OD VMT with [facility] OD  OS no ME      A/P    [facility] from VMT OD  Sx began 9/24    S/p 25g PPV/ILM peel/SF6 OD 11/6/24    Doing well, good IOP    Start gtts QID oihntment/shield HQS    Couch potato x 2 days  Do not sleep on back      2. Early NS OU        1 week

## 2024-11-14 ENCOUNTER — OFFICE VISIT (OUTPATIENT)
Dept: OPHTHALMOLOGY | Facility: CLINIC | Age: 64
End: 2024-11-14
Payer: COMMERCIAL

## 2024-11-14 DIAGNOSIS — H43.821 VITREOMACULAR ADHESION, RIGHT: ICD-10-CM

## 2024-11-14 DIAGNOSIS — H35.341 FULL THICKNESS MACULAR HOLE, RIGHT: Primary | ICD-10-CM

## 2024-11-14 PROCEDURE — 99024 POSTOP FOLLOW-UP VISIT: CPT | Mod: S$GLB,,, | Performed by: OPHTHALMOLOGY

## 2024-11-14 PROCEDURE — 99999 PR PBB SHADOW E&M-EST. PATIENT-LVL III: CPT | Mod: PBBFAC,,, | Performed by: OPHTHALMOLOGY

## 2024-11-14 PROCEDURE — 1159F MED LIST DOCD IN RCRD: CPT | Mod: CPTII,S$GLB,, | Performed by: OPHTHALMOLOGY

## 2024-11-14 PROCEDURE — 1160F RVW MEDS BY RX/DR IN RCRD: CPT | Mod: CPTII,S$GLB,, | Performed by: OPHTHALMOLOGY

## 2024-11-14 NOTE — PROGRESS NOTES
HPI     1 WEEK  POST OP     OD      Additional comments: 1 WEEK POST OP  OD     S/P PPV/ILM  peel  SF6  OD   11/06/24  VMT   Gtts    Atropine OD  qid   PF  qid OD  Moxi  qid OD   OD  jessica  german/poly /dex   qhs   DLS  11/2024          Last edited by Dana Gross on 11/14/2024 11:33 AM.        Prior OCT - OD VMT with [facility] OD  OS no ME      A/P    [facility] from VMT OD  Sx began 9/24    S/p 25g PPV/ILM peel/SF6 OD 11/6/24    Doing well, good IOP  MH closed    Taper PF 2/1/0      2. Early NS OU       1 month OCT

## 2024-11-25 ENCOUNTER — OFFICE VISIT (OUTPATIENT)
Dept: OBSTETRICS AND GYNECOLOGY | Facility: CLINIC | Age: 64
End: 2024-11-25
Attending: OBSTETRICS & GYNECOLOGY
Payer: COMMERCIAL

## 2024-11-25 VITALS
HEART RATE: 85 BPM | BODY MASS INDEX: 35.01 KG/M2 | HEIGHT: 68 IN | DIASTOLIC BLOOD PRESSURE: 84 MMHG | SYSTOLIC BLOOD PRESSURE: 141 MMHG | WEIGHT: 231 LBS

## 2024-11-25 DIAGNOSIS — Z13.820 SCREENING FOR OSTEOPOROSIS: Primary | ICD-10-CM

## 2024-11-25 DIAGNOSIS — N94.10 DYSPAREUNIA IN FEMALE: ICD-10-CM

## 2024-11-25 DIAGNOSIS — Z01.419 ENCOUNTER FOR GYNECOLOGICAL EXAMINATION WITHOUT ABNORMAL FINDING: ICD-10-CM

## 2024-11-25 PROCEDURE — 99396 PREV VISIT EST AGE 40-64: CPT | Mod: S$GLB,,, | Performed by: OBSTETRICS & GYNECOLOGY

## 2024-11-25 PROCEDURE — 3077F SYST BP >= 140 MM HG: CPT | Mod: CPTII,S$GLB,, | Performed by: OBSTETRICS & GYNECOLOGY

## 2024-11-25 PROCEDURE — 3008F BODY MASS INDEX DOCD: CPT | Mod: CPTII,S$GLB,, | Performed by: OBSTETRICS & GYNECOLOGY

## 2024-11-25 PROCEDURE — 1159F MED LIST DOCD IN RCRD: CPT | Mod: CPTII,S$GLB,, | Performed by: OBSTETRICS & GYNECOLOGY

## 2024-11-25 PROCEDURE — 99999 PR PBB SHADOW E&M-EST. PATIENT-LVL III: CPT | Mod: PBBFAC,,, | Performed by: OBSTETRICS & GYNECOLOGY

## 2024-11-25 PROCEDURE — 3079F DIAST BP 80-89 MM HG: CPT | Mod: CPTII,S$GLB,, | Performed by: OBSTETRICS & GYNECOLOGY

## 2024-11-25 RX ORDER — ESTRADIOL 0.5 MG/1
0.5 TABLET ORAL DAILY
Qty: 30 TABLET | Refills: 11 | Status: SHIPPED | OUTPATIENT
Start: 2024-11-25 | End: 2025-11-25

## 2024-11-25 RX ORDER — PRASTERONE 6.5 MG/1
6.5 INSERT VAGINAL NIGHTLY
Qty: 30 EACH | Refills: 11 | Status: CANCELLED | OUTPATIENT
Start: 2024-11-25

## 2024-11-25 RX ORDER — ESTRADIOL 0.1 MG/G
1 CREAM VAGINAL
Qty: 42.5 G | Refills: 11 | Status: SHIPPED | OUTPATIENT
Start: 2024-11-25 | End: 2025-11-25

## 2024-11-25 NOTE — PROGRESS NOTES
SUBJECTIVE:   64 y.o. female   for annual routine checkup. Patient's last menstrual period was 2013..  She wants to discuss oral estrogen replacement therapy.  Last year she was decreased from 2 mg to 1 mg.  She has questions about staying on this dose or changing it  She reports that she does not like the intra Elsie she finds it to runny.        Past Medical History:   Diagnosis Date    Allergy     Breast disorder     Left Breast Bx - Benign     Cataract     Fracture, ribs     H/O cold sores     History of robot-assisted laparoscopic hysterectomy 2021    Hormone replacement therapy (HRT)     Menopause     PONV (postoperative nausea and vomiting)     Tennis elbow     PT     Past Surgical History:   Procedure Laterality Date    BREAST BIOPSY Left     benign  (11:00  o'clock ?? )     CHOLECYSTECTOMY      COLONOSCOPY N/A 2018    Procedure: COLONOSCOPY;  Surgeon: Francisco Du MD;  [facility]  Service: Endoscopy;  Laterality: N/A;  3 year f/u - history of colon polyps - ER    COLONOSCOPY N/A 2023    Procedure: COLONOSCOPY;  Surgeon: Francisco Du MD;  [facility]  Service: Endoscopy;  Laterality: N/A;  Pt request Suprep , instr via portal - PC  pre-call  .    CYSTOSCOPY N/A 2021    Procedure: CYSTOSCOPY;  Surgeon: Isaura Cassidy MD;  Location: [facility];  Service: OB/GYN;  Laterality: N/A;    EYE SURGERY Right 2024    HYSTERECTOMY  2021    HYSTEROSCOPY N/A 2021    Procedure: HYSTEROSCOPY;  Surgeon: Isaura Cassidy MD;  Location: [facility];  Service: OB/GYN;  Laterality: N/A;    ROBOT-ASSISTED LAPAROSCOPIC ABDOMINAL HYSTERECTOMY USING DA VANESSA XI Bilateral 2021    Procedure: XI ROBOTIC HYSTERECTOMY;  Surgeon: Isaura Cassidy MD;  Location: [facility];  Service: OB/GYN;  Laterality: Bilateral;  BSO    ROBOT-ASSISTED LAPAROSCOPIC SALPINGO-OOPHORECTOMY Bilateral 2021    Procedure:  ROBOTIC SALPINGO-OOPHORECTOMY;  Surgeon: Isaura Cassidy MD;  Location: [facility];  Service: OB/GYN;  Laterality: Bilateral;    VITRECTOMY, MECHANICAL, PARS PLANA APPROACH, WITH REMOVAL OF INTERNAL LIMITING MEMBRANE OF RETINA Right 11/06/2024    Procedure: VITRECTOMY,MECHANICAL,PARS PLANA APPROACH,WITH REMOVAL OF INTERNAL LIMITING MEMBRANE OF RETINA;  Surgeon: MAGED Meaz MD;  Location: [address];  Service: Ophthalmology;  Laterality: Right;  40 min    WISDOM TOOTH EXTRACTION       Social History     Socioeconomic History    Marital status: Single   Tobacco Use    Smoking status: Never    Smokeless tobacco: Never   Substance and Sexual Activity    Alcohol use: Yes     Comment: socially    Drug use: No    Sexual activity: Not Currently     Partners: Male     Birth control/protection: Post-menopausal, Surgical     Comment: Single:         2013 //  DVH/BSO 2021     Social Drivers of Health     Financial Resource Strain: Low Risk  (1/26/2024)    Overall Financial Resource Strain (CARDIA)     Difficulty of Paying Living Expenses: Not hard at all   Food Insecurity: No Food Insecurity (1/26/2024)    Hunger Vital Sign     Worried About Running Out of Food in the Last Year: Never true     Ran Out of Food in the Last Year: Never true   Transportation Needs: No Transportation Needs (1/26/2024)    PRAPARE - Transportation     Lack of Transportation (Medical): No     Lack of Transportation (Non-Medical): No   Physical Activity: Insufficiently Active (1/26/2024)    Exercise Vital Sign     Days of Exercise per Week: 2 days     Minutes of Exercise per Session: 20 min   Stress: No Stress Concern Present (1/26/2024)    [employer] - Occupational Stress Questionnaire     Feeling of Stress : Only a little   Housing Stability: Low Risk  (1/26/2024)    Housing Stability Vital Sign     Unable to Pay for Housing in the Last Year: No     Number of Places Lived in the Last Year: 1      Unstable Housing in the Last Year: No     Family History   Problem Relation Name Age of Onset    Macular degeneration Mother      Cataracts Mother      Hypertension Mother      Heart disease Mother          at fib and pacemaker    Hypertension Father      Colon polyps Father  82        large poly needing surgery    Pulmonary embolism Father      Glaucoma Brother      Hypertension Brother      Colon polyps Brother  58        several advanced colon adenomas    Lung cancer Brother      Breast cancer Paternal Aunt      Prostate cancer Paternal Uncle      Breast cancer Paternal Cousin      Amblyopia Neg Hx      Blindness Neg Hx      Diabetes Neg Hx      Retinal detachment Neg Hx      Strabismus Neg Hx      Stroke Neg Hx      Thyroid disease Neg Hx      Celiac disease Neg Hx      Cirrhosis Neg Hx      Colon cancer Neg Hx      Esophageal cancer Neg Hx      Inflammatory bowel disease Neg Hx      Liver disease Neg Hx      Rectal cancer Neg Hx      Stomach cancer Neg Hx      Liver cancer Neg Hx      Ulcerative colitis Neg Hx      Ovarian cancer Neg Hx      Cancer Neg Hx      Uterine cancer Neg Hx      Cervical cancer Neg Hx      Melanoma Neg Hx       OB History    Para Term  AB Living   0 0 0 0 0 0   SAB IAB Ectopic Multiple Live Births   0 0 0 0 0   Obstetric Comments   Menarche ~ 10           Current Outpatient Medications   Medication Sig Dispense Refill    cycloSPORINE (RESTASIS) 0.05 % ophthalmic emulsion Place 1 drop into both eyes 2 (two) times daily. 180 each 3    ergocalciferol, vitamin D2, (VITAMIN D ORAL) Take by mouth.      estradioL (ESTRACE) 0.01 % (0.1 mg/gram) vaginal cream Place 1 g vaginally twice a week. 8 g 11    estradioL (ESTRACE) 0.5 MG tablet Take 1 tablet (0.5 mg total) by mouth once daily. 30 tablet 11    fexofenadine (ALLEGRA) 60 MG tablet Take 60 mg by mouth once daily.      ondansetron (ZOFRAN) 4 MG tablet Take 1 tablet (4 mg total) by mouth every 8 (eight) hours as needed for  Nausea. 12 tablet 0    prasterone, dhea, (INTRAROSA) 6.5 mg Inst Place 6.5 mg vaginally every evening. 28 each 11     No current facility-administered medications for this visit.     Allergies: Patient has no known allergies.     The 10-year ASCVD risk score (Alondra MCKEON, et al., 2019) is: 5.8%    Values used to calculate the score:      Age: 64 years      Sex: Female      Is Non- : No      Diabetic: No      Tobacco smoker: No      Systolic Blood Pressure: 141 mmHg      Is BP treated: No      HDL Cholesterol: 48 mg/dL      Total Cholesterol: 167 mg/dL      ROS:  Constitutional: no weight loss, weight gain, fever, fatigue  Eyes:  No vision changes, glasses/contacts  ENT/Mouth: No ulcers, sinus problems, ears ringing, headache  Cardiovascular: No inability to lie flat, chest pain, exercise intolerance, swelling, heart palpitations  Respiratory: No wheezing, coughing blood, shortness of breath, or cough  Gastrointestinal: No diarrhea, bloody stool, nausea/vomiting, constipation, gas, hemorrhoids  Genitourinary: No blood in urine, painful urination, urgency of urination, frequency of urination, incomplete emptying, incontinence, abnormal bleeding, painful periods, heavy periods, vaginal discharge, vaginal odor, painful intercourse, sexual problems, bleeding after intercourse.  Musculoskeletal: No muscle weakness  Skin/Breast: No painful breasts, nipple discharge, masses, rash, ulcers  Neurological: No passing out, seizures, numbness, headache  Endocrine: No diabetes, hypothyroid, hyperthyroid, hot flashes, hair loss, abnormal hair growth, acne  Psychiatric: No depression, crying  Hematologic: No bruises, bleeding, swollen lymph nodes, anemia.      Physical Exam:   Constitutional: She is oriented to person, place, and time. She appears well-developed and well-nourished.      Neck: No tracheal deviation present. No thyromegaly present.    Cardiovascular:       Exam reveals no edema.         Pulmonary/Chest: Effort normal. She exhibits no mass, no tenderness, no deformity and no retraction. Right breast exhibits no inverted nipple, no mass, no nipple discharge, no skin change, no tenderness, presence, no bleeding and no swelling. Left breast exhibits no inverted nipple, no mass, no nipple discharge, no skin change, no tenderness, presence, no bleeding and no swelling. Breasts are symmetrical.        Abdominal: Soft. She exhibits no distension and no mass. There is no abdominal tenderness. There is no rebound and no guarding. No hernia. Hernia confirmed negative in the left inguinal area.     Genitourinary: Rectum:      No external hemorrhoid.   There is no rash, tenderness or lesion on the right labia. There is no rash, tenderness or lesion on the left labia. No no adexnal prolapse. Right adnexum displays no mass, no tenderness and no fullness. Left adnexum displays no mass, no tenderness and no fullness. No vaginal discharge, tenderness, bleeding, rectocele, cystocele or prolapse of vaginal walls in the vagina. Cervix is absent.Uterus is absent.           Musculoskeletal: Normal range of motion and moves all extremeties. No edema.       Neurological: She is alert and oriented to person, place, and time.    Skin: No rash noted. No erythema. No pallor.    Psychiatric: She has a normal mood and affect. Her behavior is normal. Judgment and thought content normal.         ASSESSMENT:   well woman  no contraindication to continue hormonal therapy    PLAN:   Mammogram  Counseled her on risks benefits and possible side effects of remaining on oral estrogen replacement therapy.  We will decrease to 0.5 mg daily.  Counseled her on other options for vaginal dryness.  We will send in Estrace vaginal cream twice weekly.  return annually or prn

## 2024-12-06 ENCOUNTER — HOSPITAL ENCOUNTER (OUTPATIENT)
Dept: RADIOLOGY | Facility: HOSPITAL | Age: 64
Discharge: HOME | End: 2024-12-06
Attending: OBSTETRICS & GYNECOLOGY
Payer: COMMERCIAL

## 2024-12-06 DIAGNOSIS — Z13.820 SCREENING FOR OSTEOPOROSIS: ICD-10-CM

## 2024-12-06 PROCEDURE — 77080 DXA BONE DENSITY AXIAL: CPT | Mod: 26,,, | Performed by: INTERNAL MEDICINE

## 2024-12-06 PROCEDURE — 77080 DXA BONE DENSITY AXIAL: CPT | Mod: TC

## 2024-12-12 ENCOUNTER — OFFICE VISIT (OUTPATIENT)
Dept: OPHTHALMOLOGY | Facility: CLINIC | Age: 64
End: 2024-12-12
Payer: COMMERCIAL

## 2024-12-12 DIAGNOSIS — H35.341 FULL THICKNESS MACULAR HOLE, RIGHT: Primary | ICD-10-CM

## 2024-12-12 PROCEDURE — 99024 POSTOP FOLLOW-UP VISIT: CPT | Mod: S$GLB,,, | Performed by: OPHTHALMOLOGY

## 2024-12-12 PROCEDURE — 99999 PR PBB SHADOW E&M-EST. PATIENT-LVL III: CPT | Mod: PBBFAC,,, | Performed by: OPHTHALMOLOGY

## 2024-12-12 PROCEDURE — 1160F RVW MEDS BY RX/DR IN RCRD: CPT | Mod: CPTII,S$GLB,, | Performed by: OPHTHALMOLOGY

## 2024-12-12 PROCEDURE — 1159F MED LIST DOCD IN RCRD: CPT | Mod: CPTII,S$GLB,, | Performed by: OPHTHALMOLOGY

## 2024-12-12 NOTE — PROGRESS NOTES
HPI     Post-op Evaluation     Additional comments: 1 mo s/p ppv OD           Comments    VA stable ou, no pain ou and denies floaters or flashes ou.    Restasis bid ou          Last edited by Patricia Zacarias on 12/12/2024  2:36 PM.             OCT - OD FTMH closed with outer segments reorganized  OS no ME      A/P    [facility] from VMT OD  Sx began 9/24    S/p 25g PPV/ILM peel/SF6 OD 11/6/24    MH closed - outer segments reorganizing nicely      2. Early NS OU       3 month OCT/MRx

## 2025-01-24 ENCOUNTER — LAB VISIT (OUTPATIENT)
Dept: LAB | Facility: HOSPITAL | Age: 65
End: 2025-01-24
Attending: INTERNAL MEDICINE
Payer: COMMERCIAL

## 2025-01-24 DIAGNOSIS — Z00.00 PREVENTATIVE HEALTH CARE: ICD-10-CM

## 2025-01-24 LAB
ALBUMIN SERPL BCP-MCNC: 3.6 G/DL (ref 3.5–5.2)
ALP SERPL-CCNC: 67 U/L (ref 40–150)
ALT SERPL W/O P-5'-P-CCNC: 16 U/L (ref 10–44)
ANION GAP SERPL CALC-SCNC: 7 MMOL/L (ref 8–16)
AST SERPL-CCNC: 18 U/L (ref 10–40)
BASOPHILS # BLD AUTO: 0.04 K/UL (ref 0–0.2)
BASOPHILS NFR BLD: 0.6 % (ref 0–1.9)
BILIRUB SERPL-MCNC: 0.7 MG/DL (ref 0.1–1)
BUN SERPL-MCNC: 13 MG/DL (ref 8–23)
CALCIUM SERPL-MCNC: 9.3 MG/DL (ref 8.7–10.5)
CHLORIDE SERPL-SCNC: 110 MMOL/L (ref 95–110)
CHOLEST SERPL-MCNC: 175 MG/DL (ref 120–199)
CHOLEST/HDLC SERPL: 3.5 {RATIO} (ref 2–5)
CO2 SERPL-SCNC: 26 MMOL/L (ref 23–29)
CREAT SERPL-MCNC: 1 MG/DL (ref 0.5–1.4)
DIFFERENTIAL METHOD BLD: NORMAL
EOSINOPHIL # BLD AUTO: 0.3 K/UL (ref 0–0.5)
EOSINOPHIL NFR BLD: 4.1 % (ref 0–8)
ERYTHROCYTE [DISTWIDTH] IN BLOOD BY AUTOMATED COUNT: 12 % (ref 11.5–14.5)
EST. GFR  (NO RACE VARIABLE): >60 ML/MIN/1.73 M^2
GLUCOSE SERPL-MCNC: 91 MG/DL (ref 70–110)
HCT VFR BLD AUTO: 42.8 % (ref 37–48.5)
HDLC SERPL-MCNC: 50 MG/DL (ref 40–75)
HDLC SERPL: 28.6 % (ref 20–50)
HGB BLD-MCNC: 14 G/DL (ref 12–16)
IMM GRANULOCYTES # BLD AUTO: 0.02 K/UL (ref 0–0.04)
IMM GRANULOCYTES NFR BLD AUTO: 0.3 % (ref 0–0.5)
LDLC SERPL CALC-MCNC: 104 MG/DL (ref 63–159)
LYMPHOCYTES # BLD AUTO: 1.2 K/UL (ref 1–4.8)
LYMPHOCYTES NFR BLD: 18.6 % (ref 18–48)
MCH RBC QN AUTO: 31 PG (ref 27–31)
MCHC RBC AUTO-ENTMCNC: 32.7 G/DL (ref 32–36)
MCV RBC AUTO: 95 FL (ref 82–98)
MONOCYTES # BLD AUTO: 0.4 K/UL (ref 0.3–1)
MONOCYTES NFR BLD: 6.7 % (ref 4–15)
NEUTROPHILS # BLD AUTO: 4.4 K/UL (ref 1.8–7.7)
NEUTROPHILS NFR BLD: 69.7 % (ref 38–73)
NONHDLC SERPL-MCNC: 125 MG/DL
NRBC BLD-RTO: 0 /100 WBC
PLATELET # BLD AUTO: 261 K/UL (ref 150–450)
PMV BLD AUTO: 10.5 FL (ref 9.2–12.9)
POTASSIUM SERPL-SCNC: 4.8 MMOL/L (ref 3.5–5.1)
PROT SERPL-MCNC: 6.8 G/DL (ref 6–8.4)
RBC # BLD AUTO: 4.51 M/UL (ref 4–5.4)
SODIUM SERPL-SCNC: 143 MMOL/L (ref 136–145)
TRIGL SERPL-MCNC: 105 MG/DL (ref 30–150)
TSH SERPL DL<=0.005 MIU/L-ACNC: 0.84 UIU/ML (ref 0.4–4)
WBC # BLD AUTO: 6.3 K/UL (ref 3.9–12.7)

## 2025-01-24 PROCEDURE — 80053 COMPREHEN METABOLIC PANEL: CPT | Performed by: INTERNAL MEDICINE

## 2025-01-24 PROCEDURE — 84443 ASSAY THYROID STIM HORMONE: CPT | Performed by: INTERNAL MEDICINE

## 2025-01-24 PROCEDURE — 85025 COMPLETE CBC W/AUTO DIFF WBC: CPT | Performed by: INTERNAL MEDICINE

## 2025-01-24 PROCEDURE — 80061 LIPID PANEL: CPT | Performed by: INTERNAL MEDICINE

## 2025-01-24 PROCEDURE — 36415 COLL VENOUS BLD VENIPUNCTURE: CPT | Performed by: INTERNAL MEDICINE

## 2025-01-31 ENCOUNTER — OFFICE VISIT (OUTPATIENT)
Dept: INTERNAL MEDICINE | Facility: CLINIC | Age: 65
End: 2025-01-31
Payer: COMMERCIAL

## 2025-01-31 VITALS
OXYGEN SATURATION: 96 % | HEART RATE: 80 BPM | SYSTOLIC BLOOD PRESSURE: 116 MMHG | WEIGHT: 232.13 LBS | DIASTOLIC BLOOD PRESSURE: 80 MMHG | BODY MASS INDEX: 35.18 KG/M2 | HEIGHT: 68 IN

## 2025-01-31 DIAGNOSIS — Z12.39 ENCOUNTER FOR SCREENING FOR MALIGNANT NEOPLASM OF BREAST, UNSPECIFIED SCREENING MODALITY: ICD-10-CM

## 2025-01-31 DIAGNOSIS — Z00.00 PREVENTATIVE HEALTH CARE: Primary | ICD-10-CM

## 2025-01-31 PROCEDURE — 1159F MED LIST DOCD IN RCRD: CPT | Mod: CPTII,S$GLB,, | Performed by: INTERNAL MEDICINE

## 2025-01-31 PROCEDURE — 3079F DIAST BP 80-89 MM HG: CPT | Mod: CPTII,S$GLB,, | Performed by: INTERNAL MEDICINE

## 2025-01-31 PROCEDURE — 3074F SYST BP LT 130 MM HG: CPT | Mod: CPTII,S$GLB,, | Performed by: INTERNAL MEDICINE

## 2025-01-31 PROCEDURE — 3008F BODY MASS INDEX DOCD: CPT | Mod: CPTII,S$GLB,, | Performed by: INTERNAL MEDICINE

## 2025-01-31 PROCEDURE — 99999 PR PBB SHADOW E&M-EST. PATIENT-LVL IV: CPT | Mod: PBBFAC,,, | Performed by: INTERNAL MEDICINE

## 2025-01-31 PROCEDURE — 1160F RVW MEDS BY RX/DR IN RCRD: CPT | Mod: CPTII,S$GLB,, | Performed by: INTERNAL MEDICINE

## 2025-01-31 PROCEDURE — 99396 PREV VISIT EST AGE 40-64: CPT | Mod: S$GLB,,, | Performed by: INTERNAL MEDICINE

## 2025-02-05 NOTE — PROGRESS NOTES
Patient ID: Vanessa Olsen is a 64 y.o. female    Chief Complaint: Annual Exam    History of Present Illness    CHIEF COMPLAINT:  Patient presents for her annual wellness visit and checkup.    HPI:  Patient reports no current health concerns or symptoms. She previously had ankle and foot swelling, which has significantly improved. Some swelling still occurs in the summer but appears less severe than before. Patient denies having pneumonia.    MEDICATIONS:  Patient is on estrogen pills and cream, eye drops, allergy medication, and Vitamin D.    MEDICAL HISTORY:  Patient has a history of shingles, which occurred in 2018 or earlier. She underwent a bone density scan in 2024, which showed normal results. Patient received two doses of the Shingles vaccine (Shingrix) in 2018. She has also had tetanus vaccine, H1N1 vaccine in 2009, MMR vaccine in 2019, and flu vaccines.    FAMILY HISTORY:  Family history is significant for mother with a history of shingles.    SURGICAL HISTORY:  Patient recently underwent surgery, for which Zofran was prescribed post-operatively.    TEST RESULTS:  Her thyroid level is fine. The cholesterol panel shows excellent results, with good levels of HDL, LDL, and triglycerides. Blood sugar, kidney function, liver function, and electrolytes are all normal. The complete blood count indicates she is not anemic. Patient's bone density scan results are normal.    IMAGING:  Her mammogram results are normal. The next mammogram is due in September 2025.    SOCIAL HISTORY:  Patient is .          ROS: Otherwise Negative     Physical Exam    General: Well-appearing. Well-nourished. No distress.  HEENT: Conjunctivae normal. Pupils are equal and reactive to light. TMs are clear and intact bilaterally. Hearing grossly normal. Nasopharynx clear. Oropharynx clear.  Neck: Supple. No thyromegaly. No bruits.  Lymph: No cervical adenopathy. No supraclavicular adenopathy.  Heart: Regular rate and rhythm. No  murmur. No rub. No gallop.  Lungs: Clear to auscultation. Respiratory effort normal.  Abdomen: Soft. Nontender. Nondistended. Normoactive bowel sounds. No hepatomegaly. No masses.  Extremities: Good distal pulses. No edema.  Psych: Oriented to time person place. Judgment unimpaired. Insight unimpaired. Mood appropriate. Affect appropriate.          Assessment & Plan    PREVENTIVE CARE:  - Reviewed healthcare maintenance needs, including upcoming pneumonia vaccine recommendation at age 65 and discussion about RSV vaccine.  - Placed orders for next year's mammogram.  - Reviewed patient's immunization history, including shingles, tetanus, H1N1, flu, and MMR vaccines.  - Advised the patient to consider pneumonia vaccine after turning 65 at next visit.  - Reviewed bone density scan results from last year, which were normal.  - Reviewed results of last year's mammogram, which were good.  - Ordered next mammogram for September.  - Noted patient had a colonoscopy in 2023.  - Scheduled follow-up colonoscopy for 2028.    SHINGLES VACCINATION:  - Confirmed patient received Shingrix vaccine (new shingles vaccine) in 2 doses, providing lifelong protection.  - Discussed that completed Shingrix vaccine series provides 90%+ protection against shingles.  - Informed the patient about the potential for extremely mild shingles case if infection occurs post-vaccination.    RSV VACCINATION:  - Explained RSV vaccine recommendation for those over 60 prone to respiratory infections.    LABS:  - Performed comprehensive labs, including thyroid level, cholesterol, sugar, kidney, liver, electrolytes, and anemia check.  - Results of all labs were within normal range.    MEDICATIONS/SUPPLEMENTS:  - Continued eye drops and allergy medication.  - Continued estrogen pill and cream.  - Continued vitamin D supplementation.            Follow up in about 1 year (around 1/31/2026) for physical cbc tsh cmp lipids +/- Psa.    Vanessa was seen today for  annual exam.    Diagnoses and all orders for this visit:    Preventative health care  -     CBC Auto Differential; Future  -     Comprehensive Metabolic Panel; Future  -     Lipid Panel; Future  -     TSH; Future    Encounter for screening for malignant neoplasm of breast, unspecified screening modality  -     Mammo Digital Screening Bilat w/ Chevy; Future         This note was generated with the assistance of ambient listening technology. Verbal consent was obtained by the patient and accompanying visitor(s) for the recording of patient appointment to facilitate this note. I attest to having reviewed and edited the generated note for accuracy, though some syntax or spelling errors may persist. Please contact the author of this note for any clarification.

## 2025-03-13 ENCOUNTER — OFFICE VISIT (OUTPATIENT)
Dept: OPHTHALMOLOGY | Facility: CLINIC | Age: 65
End: 2025-03-13
Payer: COMMERCIAL

## 2025-03-13 DIAGNOSIS — H43.821 VITREOMACULAR ADHESION, RIGHT: ICD-10-CM

## 2025-03-13 DIAGNOSIS — H35.341 FULL THICKNESS MACULAR HOLE, RIGHT: Primary | ICD-10-CM

## 2025-03-13 PROCEDURE — 92134 CPTRZ OPH DX IMG PST SGM RTA: CPT | Mod: S$GLB,,, | Performed by: OPHTHALMOLOGY

## 2025-03-13 PROCEDURE — 1160F RVW MEDS BY RX/DR IN RCRD: CPT | Mod: CPTII,S$GLB,, | Performed by: OPHTHALMOLOGY

## 2025-03-13 PROCEDURE — 99999 PR PBB SHADOW E&M-EST. PATIENT-LVL III: CPT | Mod: PBBFAC,,, | Performed by: OPHTHALMOLOGY

## 2025-03-13 PROCEDURE — 99024 POSTOP FOLLOW-UP VISIT: CPT | Mod: S$GLB,,, | Performed by: OPHTHALMOLOGY

## 2025-03-13 PROCEDURE — 1159F MED LIST DOCD IN RCRD: CPT | Mod: CPTII,S$GLB,, | Performed by: OPHTHALMOLOGY

## 2025-03-13 NOTE — PROGRESS NOTES
HPI     Follow-up     Additional comments: 3 mo check s/p ppv OD           Comments    Patient here today stating Va has improved post sx, would like new glasses   prescription, no pain ou and occasional floaters in the periphery OD   without flashes ou.    Restasis bid ou          Last edited by Patricia Zacarias on 3/13/2025  2:18 PM.                  OCT - OD [facility] closed with outer segments reorganized  OS no ME      A/P    [facility] from VMT OD  Sx began 9/24    S/p 25g PPV/ILM peel/SF6 OD 11/6/24    MH closed - outer segments reorganizing nicely      2. Early NS OU       Me PRN    Will follow up with Dr. Mojica for MRx  Update - pt aware that MRx may be ineffective with continue post PPV lens changes

## 2025-06-13 ENCOUNTER — PATIENT MESSAGE (OUTPATIENT)
Dept: OPHTHALMOLOGY | Facility: CLINIC | Age: 65
End: 2025-06-13
Payer: COMMERCIAL

## 2025-08-05 ENCOUNTER — OFFICE VISIT (OUTPATIENT)
Dept: OPTOMETRY | Facility: CLINIC | Age: 65
End: 2025-08-05
Payer: COMMERCIAL

## 2025-08-05 DIAGNOSIS — H35.341 MACULAR HOLE, RIGHT: ICD-10-CM

## 2025-08-05 DIAGNOSIS — H25.13 NUCLEAR SCLEROSIS OF BOTH EYES: Primary | ICD-10-CM

## 2025-08-05 PROCEDURE — 99999 PR PBB SHADOW E&M-EST. PATIENT-LVL III: CPT | Mod: PBBFAC,,, | Performed by: OPTOMETRIST

## 2025-08-05 PROCEDURE — 99213 OFFICE O/P EST LOW 20 MIN: CPT | Mod: S$GLB,,, | Performed by: OPTOMETRIST

## 2025-08-05 NOTE — PROGRESS NOTES
HPI    63 Y/o female is here for ocular health pt say's since macular Sx last   year she does vision like vision has gotten worse after saying it may due   to a cataract, Pt say's has been seeing halos and star burst around lights   and a little blurry vision, and say's she cannot see anything out OD Sx   eye/   Pt denies pain and discomfort   Occ floaters OD     Eye med: Restasis OU BID  Last edited by Igor Doty MA on 8/5/2025  1:09 PM.            Assessment /Plan     For exam results, see Encounter Report.    Nuclear sclerosis of both eyes    Macular hole, right      Refer to Dr. Lomax for cataract evaluation and possible removal.   2. Derrick pt, seen in 3/2025

## 2025-09-02 DIAGNOSIS — H04.123 BILATERAL DRY EYES: ICD-10-CM

## 2025-09-02 RX ORDER — CYCLOSPORINE 0.5 MG/ML
1 EMULSION OPHTHALMIC 2 TIMES DAILY
Qty: 180 EACH | Refills: 3 | Status: SHIPPED | OUTPATIENT
Start: 2025-09-02 | End: 2026-09-02

## (undated) DEVICE — NDL 22GA X1 1/2 REG BEVEL

## (undated) DEVICE — KIT PERFLUOROCARBON LIQUID

## (undated) DEVICE — SOL BSS BALANCED SALT

## (undated) DEVICE — FORCEP GRIESHABER MAXGRIP 25G

## (undated) DEVICE — NDL INSUF ULTRA VERESS 120MM

## (undated) DEVICE — DRAPE COLUMN DAVINCI XI

## (undated) DEVICE — SUT MCRYL PLUS 4-0 PS2 27IN

## (undated) DEVICE — DRESSING EYE OVAL LF

## (undated) DEVICE — SEAL UNIVERSAL 5MM-8MM XI

## (undated) DEVICE — DRAPE THREE-QTR REINF 53X77IN

## (undated) DEVICE — SEE MEDLINE ITEM 156923

## (undated) DEVICE — CANNULA OPTHALMIC SOF TIP 25G

## (undated) DEVICE — SEAL LENS SCOPE MYOSURE

## (undated) DEVICE — ELECTRODE REM PLYHSV RETURN 9

## (undated) DEVICE — COVER TIP CURVED SCISSORS XI

## (undated) DEVICE — SOL NS 1000CC

## (undated) DEVICE — NDL HYPO A BEVEL 30X1/2

## (undated) DEVICE — IRRIGATOR ENDOSCOPY DISP.

## (undated) DEVICE — GOWN SURGICAL X-LARGE

## (undated) DEVICE — CORD FOR BIPOLAR FORCEPS 12

## (undated) DEVICE — CONTAINER SPECIMEN OR STER 4OZ

## (undated) DEVICE — SEE MEDLINE ITEM 157110

## (undated) DEVICE — Device

## (undated) DEVICE — SOL BALANCED SALT 500ML

## (undated) DEVICE — COVER PROXIMA MAYO STAND

## (undated) DEVICE — NEEDLE HYPODERMIC HUB LUER LOC

## (undated) DEVICE — GLOVE BIOGEL ECLIPSE SZ 6.5

## (undated) DEVICE — GLOVE BIOGEL SKINSENSE PI 6.0

## (undated) DEVICE — HOLDER TUBE

## (undated) DEVICE — APPLICATOR ARISTA FLEX XL

## (undated) DEVICE — SYR 10CC LUER LOCK

## (undated) DEVICE — OCCLUDER COLPO-PNEUMO STERILE

## (undated) DEVICE — BACKFLUSH 25GA SOFT-TIP DISP

## (undated) DEVICE — DRAPE ARM DAVINCI XI

## (undated) DEVICE — OBTURATOR BLADELESS 8MM XI CLR

## (undated) DEVICE — TANK GAS ISPAN 125GR FOR SF6

## (undated) DEVICE — SYR DISP LL 5CC

## (undated) DEVICE — SET BASIN 48X48IN 6000ML RING

## (undated) DEVICE — POWDER ARISTA AH 1GM

## (undated) DEVICE — SOL GONAK

## (undated) DEVICE — FORCEP GRASPING 25GA SMOOTH

## (undated) DEVICE — SOL CLEARIFY VISUALIZATION LAP

## (undated) DEVICE — SOL WATER STRL IRR 1000ML

## (undated) DEVICE — PROBE ILLUM FLEX CURVE LASER

## (undated) DEVICE — NDL HYPO STD REG BVL 30G 0.5IN

## (undated) DEVICE — PACK FLUENT DISPOSABLE

## (undated) DEVICE — SUT 7/0 18IN COATED VICRYL

## (undated) DEVICE — SUT 0 V-LOC GR GS-21 TAPER

## (undated) DEVICE — PACK INSTRUMENT COVER DISPO

## (undated) DEVICE — LENS VITRCTMY OPHTH 30DEG 59DE

## (undated) DEVICE — GLOVE BIOGEL SKINSENSE PI 6.5

## (undated) DEVICE — KNIFE OPHTH MICRO UNITOME 5MM

## (undated) DEVICE — JELLY SURGILUBE 5GR

## (undated) DEVICE — PORT ACCESS 8MM W/120MM LOW

## (undated) DEVICE — SOL 9P NACL IRR PIC IL

## (undated) DEVICE — SOL BETADINE 5%

## (undated) DEVICE — PACK TOTAL PLUS 25G VITRECTOMY

## (undated) DEVICE — SYRINGE 30CC LL W/O NDL

## (undated) DEVICE — SOL IRR NACL .9% 3000ML

## (undated) DEVICE — TRAY MUSCLE LID EYE

## (undated) DEVICE — CLOSURE SKIN STERI STRIP 1/2X4

## (undated) DEVICE — DEVICE ANC SW STAT FOLEY 6-24

## (undated) DEVICE — SUT VICRYL PLUS 0 CT1 36IN

## (undated) DEVICE — INSERT CUSHIONPRONE VIEW LARGE

## (undated) DEVICE — SCRUB 10% POVIDONE IODINE 4OZ

## (undated) DEVICE — TIP RUMI MANIPULATOR LAVENDER

## (undated) DEVICE — SUT VICRYL 0 27 CT-2

## (undated) DEVICE — KIT WING PAD POSITIONING

## (undated) DEVICE — SET TRI-LUMEN FILTERED TUBE

## (undated) DEVICE — COVER MAYO STAND REINFRCD 30

## (undated) DEVICE — SOL ELECTROLUBE ANTI-STIC

## (undated) DEVICE — KIT GREY EYE